# Patient Record
Sex: MALE | Race: WHITE | Employment: FULL TIME | URBAN - METROPOLITAN AREA
[De-identification: names, ages, dates, MRNs, and addresses within clinical notes are randomized per-mention and may not be internally consistent; named-entity substitution may affect disease eponyms.]

---

## 2019-09-26 ENCOUNTER — HOSPITAL ENCOUNTER (OUTPATIENT)
Age: 34
Setting detail: SPECIMEN
Discharge: HOME | End: 2019-09-26
Payer: COMMERCIAL

## 2019-09-26 LAB
ABSOLUTE EOS #: 0.39 K/UL (ref 0–0.44)
ABSOLUTE IMMATURE GRANULOCYTE: 0.03 K/UL (ref 0–0.3)
ABSOLUTE LYMPH #: 2.22 K/UL (ref 1.1–3.7)
ABSOLUTE MONO #: 0.67 K/UL (ref 0.1–1.2)
ALBUMIN SERPL-MCNC: 4 G/DL (ref 3.5–5.2)
ALBUMIN/GLOBULIN RATIO: 1.3 (ref 1–2.5)
ALP BLD-CCNC: 44 U/L (ref 40–129)
ALT SERPL-CCNC: 69 U/L (ref 5–41)
ANION GAP SERPL CALCULATED.3IONS-SCNC: 11 MMOL/L (ref 9–17)
AST SERPL-CCNC: 58 U/L
BASOPHILS # BLD: 1 % (ref 0–2)
BASOPHILS ABSOLUTE: 0.05 K/UL (ref 0–0.2)
BILIRUB SERPL-MCNC: 0.36 MG/DL (ref 0.3–1.2)
BUN BLDV-MCNC: 7 MG/DL (ref 6–20)
BUN/CREAT BLD: ABNORMAL (ref 9–20)
CALCIUM SERPL-MCNC: 9.7 MG/DL (ref 8.6–10.4)
CHLORIDE BLD-SCNC: 105 MMOL/L (ref 98–107)
CHOLESTEROL/HDL RATIO: 3.5
CHOLESTEROL: 169 MG/DL
CO2: 25 MMOL/L (ref 20–31)
CREAT SERPL-MCNC: 0.85 MG/DL (ref 0.7–1.2)
DIFFERENTIAL TYPE: ABNORMAL
EOSINOPHILS RELATIVE PERCENT: 5 % (ref 1–4)
GFR AFRICAN AMERICAN: >60 ML/MIN
GFR NON-AFRICAN AMERICAN: >60 ML/MIN
GFR SERPL CREATININE-BSD FRML MDRD: ABNORMAL ML/MIN/{1.73_M2}
GFR SERPL CREATININE-BSD FRML MDRD: ABNORMAL ML/MIN/{1.73_M2}
GLUCOSE BLD-MCNC: 103 MG/DL (ref 70–99)
HAV IGM SER IA-ACNC: NONREACTIVE
HCT VFR BLD CALC: 46.8 % (ref 40.7–50.3)
HDLC SERPL-MCNC: 48 MG/DL
HEMOGLOBIN: 14.9 G/DL (ref 13–17)
HEPATITIS B CORE IGM ANTIBODY: NONREACTIVE
HEPATITIS B SURFACE ANTIGEN: NONREACTIVE
HEPATITIS C ANTIBODY: REACTIVE
IMMATURE GRANULOCYTES: 0 %
LDL CHOLESTEROL: 104 MG/DL (ref 0–130)
LYMPHOCYTES # BLD: 28 % (ref 24–43)
MCH RBC QN AUTO: 29.7 PG (ref 25.2–33.5)
MCHC RBC AUTO-ENTMCNC: 31.8 G/DL (ref 28.4–34.8)
MCV RBC AUTO: 93.4 FL (ref 82.6–102.9)
MONOCYTES # BLD: 9 % (ref 3–12)
NRBC AUTOMATED: 0 PER 100 WBC
PDW BLD-RTO: 13.2 % (ref 11.8–14.4)
PLATELET # BLD: 331 K/UL (ref 138–453)
PLATELET ESTIMATE: ABNORMAL
PMV BLD AUTO: 10.9 FL (ref 8.1–13.5)
POTASSIUM SERPL-SCNC: 4.5 MMOL/L (ref 3.7–5.3)
RBC # BLD: 5.01 M/UL (ref 4.21–5.77)
RBC # BLD: ABNORMAL 10*6/UL
SEG NEUTROPHILS: 57 % (ref 36–65)
SEGMENTED NEUTROPHILS ABSOLUTE COUNT: 4.48 K/UL (ref 1.5–8.1)
SODIUM BLD-SCNC: 141 MMOL/L (ref 135–144)
T. PALLIDUM, IGG: NONREACTIVE
TOTAL PROTEIN: 7.2 G/DL (ref 6.4–8.3)
TRIGL SERPL-MCNC: 84 MG/DL
TSH SERPL DL<=0.05 MIU/L-ACNC: 0.4 MIU/L (ref 0.3–5)
VLDLC SERPL CALC-MCNC: NORMAL MG/DL (ref 1–30)
WBC # BLD: 7.8 K/UL (ref 3.5–11.3)
WBC # BLD: ABNORMAL 10*3/UL

## 2019-09-27 LAB
DIRECT EXAM: ABNORMAL
DIRECT EXAM: ABNORMAL
HIV AG/AB: NONREACTIVE
Lab: ABNORMAL
SEDIMENTATION RATE, ERYTHROCYTE: 3 MM (ref 0–10)
SPECIMEN DESCRIPTION: ABNORMAL

## 2019-09-30 LAB
HCV QUANTITATIVE: NORMAL
HEPATITIS C GENOTYPE: NORMAL
PORPHYRIN ERYTHROCYTE: 26 UG/DL (ref 0–35)

## 2020-03-25 PROCEDURE — 99282 EMERGENCY DEPT VISIT SF MDM: CPT

## 2020-03-26 ENCOUNTER — APPOINTMENT (OUTPATIENT)
Dept: GENERAL RADIOLOGY | Age: 35
End: 2020-03-26
Payer: COMMERCIAL

## 2020-03-26 ENCOUNTER — HOSPITAL ENCOUNTER (EMERGENCY)
Age: 35
Discharge: HOME | End: 2020-03-26
Attending: EMERGENCY MEDICINE
Payer: COMMERCIAL

## 2020-03-26 VITALS
OXYGEN SATURATION: 98 % | DIASTOLIC BLOOD PRESSURE: 70 MMHG | BODY MASS INDEX: 26.66 KG/M2 | TEMPERATURE: 98.2 F | WEIGHT: 180 LBS | HEIGHT: 69 IN | RESPIRATION RATE: 18 BRPM | SYSTOLIC BLOOD PRESSURE: 131 MMHG | HEART RATE: 83 BPM

## 2020-03-26 LAB
FLU A ANTIGEN: NEGATIVE
FLU B ANTIGEN: NEGATIVE
GROUP A STREP CULTURE, REFLEX: NEGATIVE
REFLEX THROAT C + S: NORMAL

## 2020-03-26 PROCEDURE — 87880 STREP A ASSAY W/OPTIC: CPT

## 2020-03-26 PROCEDURE — 87804 INFLUENZA ASSAY W/OPTIC: CPT

## 2020-03-26 PROCEDURE — 87070 CULTURE OTHR SPECIMN AEROBIC: CPT

## 2020-03-26 PROCEDURE — 71045 X-RAY EXAM CHEST 1 VIEW: CPT

## 2020-03-26 RX ORDER — AZITHROMYCIN 250 MG/1
TABLET, FILM COATED ORAL
Qty: 1 PACKET | Refills: 0 | Status: SHIPPED | OUTPATIENT
Start: 2020-03-26 | End: 2020-03-30

## 2020-03-26 NOTE — ED NOTES
Patient requested a work note. Talked with Dr. Santosh Lucas, told patient he should stay off for 14 days due to not being able to test him for Covid 19.       Kristen Valdez, JARROD  03/26/20 1600

## 2020-03-27 ENCOUNTER — CARE COORDINATION (OUTPATIENT)
Dept: CARE COORDINATION | Age: 35
End: 2020-03-27

## 2020-03-27 NOTE — CARE COORDINATION
COVID-19 Screening Initial Follow-up Note     Patient contacted regarding Nitin Atkins. Care Transition Nurse/ Ambulatory Care Manager contacted the patient by telephone to perform post discharge assessment. Verified name and  with patient as identifiers. Provided introduction to self, and explanation of the CTN/ACM role, and reason for call due to risk factors for infection and/or exposure to COVID-19. Symptoms reviewed with patient who verbalized the following symptoms: cough, SOB, chills. Due to no new or worsening symptoms encounter was not routed to provider for escalation. Patient has following risk factors of: symptoms present: SOB, cough, chills. CTN/ACM reviewed discharge instructions, medical action plan and red flags such as increased shortness of breath, increasing fever and signs of decompensation with patient who verbalized understanding. Discussed exposure protocols and quarantine with CDC Guidelines What to do if you are sick with coronavirus disease 2019 Patient who was given an opportunity for questions and concerns. The patient agrees to contact the [facility] exposure line [phone], local [facility] department [place] [facility]: ([phone]) and PCP office for questions related to their healthcare. CTN/ACM provided contact information for future reference. Reviewed and educated patient on any new and changed medications related to discharge diagnosis     Plan for follow-up call in 3-5 days based on severity of symptoms and risk factors  Spoke with pt today. Pt states that he was told by someone if he goes to flu clinic he can be tested for COVID-19. Informed him that is not accurate information. Informed pt that the testing completed in ED yesterday is testing they are doing at clinic. Pt states that he did obtain atb prescribed yesterday. Symptoms continue but feels they have improved slightly. Educated pt on COVID-19 precautions.   Encouraged to complete atb as prescribed and continue monitoring symptoms.

## 2020-03-28 LAB — THROAT/NOSE CULTURE: NORMAL

## 2020-03-30 ENCOUNTER — CARE COORDINATION (OUTPATIENT)
Dept: CARE COORDINATION | Age: 35
End: 2020-03-30

## 2020-04-08 ENCOUNTER — CARE COORDINATION (OUTPATIENT)
Dept: CARE COORDINATION | Age: 35
End: 2020-04-08

## 2020-04-08 NOTE — CARE COORDINATION
COVID-19 Screening Follow-up Note    Patient contacted regarding COVID-19 risk and screening. Care Transition Nurse/ Ambulatory Care Manager contacted the patient by telephone to perform follow-up assessment. Verified name and  with patient as identifiers. Patient has following risk factors of: no known risk factors        Symptoms reviewed with patient who verbalized the following symptoms: pt reports that all symptoms have resolved. Due to no new or worsening symptoms encounter was not routed to provider for escalation. Education provided regarding infection prevention, and signs and symptoms of COVID-19 and when to seek medical attention with patient who verbalized understanding. Discussed exposure protocols and quarantine from [address] you at higher risk for severe illness  and given an opportunity for questions and concerns. The patient agrees to contact the COVID-19 hotline [phone] or PCP office for questions related to their healthcare. CTN/ACM provided contact information for future reference. From CDC: Are you at higher risk for severe illness?  Wash your hands often.  Avoid close contact (6 feet, which is about two arm lengths) with people who are sick.  Put distance between yourself and other people if COVID-19 is spreading in your community.  Clean and disinfect frequently touched surfaces.  Avoid all cruise travel and non-essential air travel.  Call your healthcare professional if you have concerns about COVID-19 and your underlying condition or if you are sick.     For more information on steps you can take to protect yourself, see CDC's How to [place] for follow-up call in 5-7 days based on severity of symptoms and risk factors

## 2020-04-14 ENCOUNTER — CARE COORDINATION (OUTPATIENT)
Dept: CARE COORDINATION | Age: 35
End: 2020-04-14

## 2020-05-07 ENCOUNTER — HOSPITAL ENCOUNTER (EMERGENCY)
Age: 35
Discharge: HOME | End: 2020-05-07
Payer: COMMERCIAL

## 2020-05-07 VITALS
RESPIRATION RATE: 15 BRPM | HEART RATE: 58 BPM | DIASTOLIC BLOOD PRESSURE: 64 MMHG | WEIGHT: 170 LBS | TEMPERATURE: 98.5 F | SYSTOLIC BLOOD PRESSURE: 106 MMHG | OXYGEN SATURATION: 100 % | BODY MASS INDEX: 26.68 KG/M2 | HEIGHT: 67 IN

## 2020-05-07 LAB
ACETAMINOPHEN LEVEL: < 5 UG/ML (ref 0–20)
ALBUMIN SERPL-MCNC: 4.8 G/DL (ref 3.5–5.1)
ALP BLD-CCNC: 48 U/L (ref 38–126)
ALT SERPL-CCNC: 38 U/L (ref 11–66)
AMPHETAMINE+METHAMPHETAMINE URINE SCREEN: POSITIVE
ANION GAP SERPL CALCULATED.3IONS-SCNC: 18 MEQ/L (ref 8–16)
AST SERPL-CCNC: 49 U/L (ref 5–40)
BACTERIA: ABNORMAL /HPF
BARBITURATE QUANTITATIVE URINE: NEGATIVE
BASOPHILS # BLD: 0.5 %
BASOPHILS ABSOLUTE: 0.1 THOU/MM3 (ref 0–0.1)
BENZODIAZEPINE QUANTITATIVE URINE: NEGATIVE
BILIRUB SERPL-MCNC: 0.8 MG/DL (ref 0.3–1.2)
BILIRUBIN DIRECT: < 0.2 MG/DL (ref 0–0.3)
BILIRUBIN URINE: NEGATIVE
BLOOD, URINE: NEGATIVE
BUN BLDV-MCNC: 17 MG/DL (ref 7–22)
CALCIUM SERPL-MCNC: 10.2 MG/DL (ref 8.5–10.5)
CANNABINOID QUANTITATIVE URINE: POSITIVE
CASTS 2: ABNORMAL /LPF
CASTS UA: ABNORMAL /LPF
CHARACTER, URINE: CLEAR
CHLORIDE BLD-SCNC: 96 MEQ/L (ref 98–111)
CO2: 19 MEQ/L (ref 23–33)
COCAINE METABOLITE QUANTITATIVE URINE: NEGATIVE
COLOR: YELLOW
CREAT SERPL-MCNC: 1.2 MG/DL (ref 0.4–1.2)
CRYSTALS, UA: ABNORMAL
EOSINOPHIL # BLD: 0.4 %
EOSINOPHILS ABSOLUTE: 0 THOU/MM3 (ref 0–0.4)
EPITHELIAL CELLS, UA: ABNORMAL /HPF
ERYTHROCYTE [DISTWIDTH] IN BLOOD BY AUTOMATED COUNT: 13.2 % (ref 11.5–14.5)
ERYTHROCYTE [DISTWIDTH] IN BLOOD BY AUTOMATED COUNT: 43.3 FL (ref 35–45)
ETHYL ALCOHOL, SERUM: < 0.01 %
GFR SERPL CREATININE-BSD FRML MDRD: 69 ML/MIN/1.73M2
GLUCOSE BLD-MCNC: 81 MG/DL (ref 70–108)
GLUCOSE URINE: NEGATIVE MG/DL
HCT VFR BLD CALC: 46.1 % (ref 42–52)
HEMOGLOBIN: 15.5 GM/DL (ref 14–18)
IMMATURE GRANS (ABS): 0.04 THOU/MM3 (ref 0–0.07)
IMMATURE GRANULOCYTES: 0.4 %
KETONES, URINE: 80
LEUKOCYTE ESTERASE, URINE: NEGATIVE
LYMPHOCYTES # BLD: 21.6 %
LYMPHOCYTES ABSOLUTE: 2.4 THOU/MM3 (ref 1–4.8)
MCH RBC QN AUTO: 30.3 PG (ref 26–33)
MCHC RBC AUTO-ENTMCNC: 33.6 GM/DL (ref 32.2–35.5)
MCV RBC AUTO: 90 FL (ref 80–94)
MISCELLANEOUS 2: ABNORMAL
MONOCYTES # BLD: 6.1 %
MONOCYTES ABSOLUTE: 0.7 THOU/MM3 (ref 0.4–1.3)
NITRITE, URINE: NEGATIVE
NUCLEATED RED BLOOD CELLS: 0 /100 WBC
OPIATES, URINE: NEGATIVE
OSMOLALITY CALCULATION: 267 MOSMOL/KG (ref 275–300)
OXYCODONE: NEGATIVE
PH UA: 5 (ref 5–9)
PHENCYCLIDINE QUANTITATIVE URINE: NEGATIVE
PLATELET # BLD: 395 THOU/MM3 (ref 130–400)
PMV BLD AUTO: 9.4 FL (ref 9.4–12.4)
POTASSIUM SERPL-SCNC: 4.3 MEQ/L (ref 3.5–5.2)
PROTEIN UA: ABNORMAL
RBC # BLD: 5.12 MILL/MM3 (ref 4.7–6.1)
RBC URINE: ABNORMAL /HPF
RENAL EPITHELIAL, UA: ABNORMAL
SALICYLATE, SERUM: < 0.3 MG/DL (ref 2–10)
SEG NEUTROPHILS: 71 %
SEGMENTED NEUTROPHILS ABSOLUTE COUNT: 7.9 THOU/MM3 (ref 1.8–7.7)
SODIUM BLD-SCNC: 133 MEQ/L (ref 135–145)
SPECIFIC GRAVITY, URINE: 1.03 (ref 1–1.03)
TOTAL PROTEIN: 7.7 G/DL (ref 6.1–8)
TSH SERPL DL<=0.05 MIU/L-ACNC: 1.28 UIU/ML (ref 0.4–4.2)
UROBILINOGEN, URINE: 1 EU/DL (ref 0–1)
WBC # BLD: 11.1 THOU/MM3 (ref 4.8–10.8)
WBC UA: ABNORMAL /HPF
YEAST: ABNORMAL

## 2020-05-07 PROCEDURE — 80053 COMPREHEN METABOLIC PANEL: CPT

## 2020-05-07 PROCEDURE — 82248 BILIRUBIN DIRECT: CPT

## 2020-05-07 PROCEDURE — 36415 COLL VENOUS BLD VENIPUNCTURE: CPT

## 2020-05-07 PROCEDURE — 85025 COMPLETE CBC W/AUTO DIFF WBC: CPT

## 2020-05-07 PROCEDURE — G0480 DRUG TEST DEF 1-7 CLASSES: HCPCS

## 2020-05-07 PROCEDURE — 84443 ASSAY THYROID STIM HORMONE: CPT

## 2020-05-07 PROCEDURE — 80307 DRUG TEST PRSMV CHEM ANLYZR: CPT

## 2020-05-07 PROCEDURE — 96372 THER/PROPH/DIAG INJ SC/IM: CPT

## 2020-05-07 PROCEDURE — 81001 URINALYSIS AUTO W/SCOPE: CPT

## 2020-05-07 PROCEDURE — 6360000002 HC RX W HCPCS: Performed by: NURSE PRACTITIONER

## 2020-05-07 PROCEDURE — 99285 EMERGENCY DEPT VISIT HI MDM: CPT

## 2020-05-07 RX ORDER — LORAZEPAM 2 MG/ML
1 INJECTION INTRAMUSCULAR ONCE
Status: COMPLETED | OUTPATIENT
Start: 2020-05-07 | End: 2020-05-07

## 2020-05-07 RX ORDER — OLANZAPINE 10 MG/1
5 TABLET, ORALLY DISINTEGRATING ORAL ONCE
Status: DISCONTINUED | OUTPATIENT
Start: 2020-05-07 | End: 2020-05-07

## 2020-05-07 RX ORDER — HALOPERIDOL 5 MG/ML
5 INJECTION INTRAMUSCULAR ONCE
Status: COMPLETED | OUTPATIENT
Start: 2020-05-07 | End: 2020-05-07

## 2020-05-07 RX ORDER — DIPHENHYDRAMINE HYDROCHLORIDE 50 MG/ML
50 INJECTION INTRAMUSCULAR; INTRAVENOUS ONCE
Status: COMPLETED | OUTPATIENT
Start: 2020-05-07 | End: 2020-05-07

## 2020-05-07 RX ADMIN — HALOPERIDOL LACTATE 5 MG: 5 INJECTION, SOLUTION INTRAMUSCULAR at 01:00

## 2020-05-07 RX ADMIN — DIPHENHYDRAMINE HYDROCHLORIDE 50 MG: 50 INJECTION, SOLUTION INTRAMUSCULAR; INTRAVENOUS at 01:00

## 2020-05-07 RX ADMIN — LORAZEPAM 1 MG: 2 INJECTION, SOLUTION INTRAMUSCULAR; INTRAVENOUS at 01:00

## 2020-05-07 ASSESSMENT — SLEEP AND FATIGUE QUESTIONNAIRES
DO YOU HAVE DIFFICULTY SLEEPING: YES
AVERAGE NUMBER OF SLEEP HOURS: 0
DIFFICULTY FALLING ASLEEP: YES
DO YOU USE A SLEEP AID: NO
DIFFICULTY ARISING: NO
RESTFUL SLEEP: NO
DIFFICULTY STAYING ASLEEP: YES
SLEEP PATTERN: INSOMNIA

## 2020-05-07 ASSESSMENT — ENCOUNTER SYMPTOMS
NAUSEA: 0
SORE THROAT: 0
RHINORRHEA: 0
VOMITING: 0
COUGH: 0
SHORTNESS OF BREATH: 0
ABDOMINAL PAIN: 0

## 2020-05-07 NOTE — ED NOTES
Pt resting supine at this time. Pt opened eyes and moved around in bed. VS reassessed and Respiraitons regular. This RN told pt that VS need to be taken and pt states \" mhm\". Unable to get urine sample at this time.       Suresh Rush RN  05/07/20 7772
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
## 2020-05-07 NOTE — ED NOTES
ED nurse-to-nurse bedside report    Chief Complaint   Patient presents with    Paranoid    Psychiatric Evaluation      LOC: alert to only name  Vital signs   Vitals:    05/07/20 0042 05/07/20 0102 05/07/20 0333 05/07/20 0513   BP: (!) 147/98  (!) 97/48 106/64   Pulse: 120  53 58   Resp: 20  15 15   Temp: 98.5 °F (36.9 °C)      TempSrc: Oral      SpO2: 94%  99% 100%   Weight:  170 lb (77.1 kg)     Height:  5' 7\" (1.702 m)        Pain:    Pain Interventions: haldol, benadryl, ativan   Pain Goal: none  Oxygen: No    Current needs required room air    Telemetry: No  LDAs:    Continuous Infusions:   Mobility: Independent  Ralph Fall Risk Score: No flowsheet data found.   Fall Interventions: side rail up, bed locked and in lowest position   Report given to: Raphael Vela RN  05/07/20 0715       Tristan Vela RN  05/07/20 9306

## 2020-05-07 NOTE — ED NOTES
Patient placed in safe room that is ligature resistant with continuous monitoring in place. Provider notified, requested an assessment by behavioral health . Patient belongings secured in a locked lockers outside of the room. Explained suicide prevention precautions to the patient including constant observer.         [place], [place]  54/69/28 1392

## 2020-05-07 NOTE — PROGRESS NOTES
36  Clinician attempted to wake patient. Clinician turned the lights on and stated patient's name numerous times. Patient somewhat awake, but still lethargic. Patient denies suicidal and homicidal thought. Regarding if his family is still in danger, patient reports \"They probably are\". [address] served Dr. Calin Andrade. [address] with Dr. Calin Andrade about patient. He states 'let the patient sleep' and 'get a urine drug screen'. He states to keep attempting to get a urine drug screen with patient. Charge nurse Brooks Choudhury informed. 910  Spoke with patient. Pt states he cannot provide a urine sample right now. Patient reports he went to [facility] for a drug problem. Patient does not recall what happened at [facility]. Regarding if his grandparents are in danger, pt reports \"I do not know, it might be a possibility\". Regarding if someone is out to harm him, pt reports 'could be a possibility'. Clinician left light on in patients room. Patient encouraged to provide a urine. 936  Patient encouraged to provide urine specimen. Pt unable to provide sample. 1037  Patient still unable to provide urine specimen. Patient seems more awake and alert now. Regarding admission or discharge, patient reports 'I do not know, I would like to see about my family. They are probably not alive anymore'. Patient provided with phone to contact family members. 1046  Patient provided urine specimen. Brooks Choudhury RN informed. [facility] served Dr. Calin Andrade  [phone]  Consulted with Dr. Calin Andrade. Patient to be discharged and follow up with [facility]. ED provider informed. Patient informed of POC who was receptive. [facility] provided to Ravi Contreras NP.  1200  Spoke with provider. Patient will be discharged.

## 2020-10-14 ENCOUNTER — HOSPITAL ENCOUNTER (OUTPATIENT)
Dept: ULTRASOUND IMAGING | Age: 35
Discharge: HOME | End: 2020-10-14
Payer: COMMERCIAL

## 2020-10-14 ENCOUNTER — HOSPITAL ENCOUNTER (OUTPATIENT)
Age: 35
Discharge: HOME | End: 2020-10-14
Payer: COMMERCIAL

## 2020-10-14 LAB
ALBUMIN SERPL-MCNC: 4.3 G/DL (ref 3.5–5.1)
ALP BLD-CCNC: 47 U/L (ref 38–126)
ALT SERPL-CCNC: 47 U/L (ref 11–66)
ANION GAP SERPL CALCULATED.3IONS-SCNC: 10 MEQ/L (ref 8–16)
AST SERPL-CCNC: 36 U/L (ref 5–40)
BASOPHILS # BLD: 0.8 %
BASOPHILS ABSOLUTE: 0.1 THOU/MM3 (ref 0–0.1)
BILIRUB SERPL-MCNC: 0.4 MG/DL (ref 0.3–1.2)
BUN BLDV-MCNC: 10 MG/DL (ref 7–22)
CALCIUM SERPL-MCNC: 9.5 MG/DL (ref 8.5–10.5)
CHLORIDE BLD-SCNC: 104 MEQ/L (ref 98–111)
CO2: 27 MEQ/L (ref 23–33)
CREAT SERPL-MCNC: 0.9 MG/DL (ref 0.4–1.2)
EOSINOPHIL # BLD: 3.4 %
EOSINOPHILS ABSOLUTE: 0.2 THOU/MM3 (ref 0–0.4)
ERYTHROCYTE [DISTWIDTH] IN BLOOD BY AUTOMATED COUNT: 12.8 % (ref 11.5–14.5)
ERYTHROCYTE [DISTWIDTH] IN BLOOD BY AUTOMATED COUNT: 44 FL (ref 35–45)
GFR SERPL CREATININE-BSD FRML MDRD: > 90 ML/MIN/1.73M2
GLUCOSE BLD-MCNC: 91 MG/DL (ref 70–108)
HBV SURFACE AB TITR SER: POSITIVE {TITER}
HCT VFR BLD CALC: 49 % (ref 42–52)
HEMOGLOBIN: 16.4 GM/DL (ref 14–18)
HEPATITIS B CORE IGM ANTIBODY: NEGATIVE
HEPATITIS B SURFACE ANTIGEN: NEGATIVE
HEPATITIS C ANTIBODY: ABNORMAL
IMMATURE GRANS (ABS): 0.03 THOU/MM3 (ref 0–0.07)
IMMATURE GRANULOCYTES: 0.4 %
INR BLD: 0.89 (ref 0.85–1.13)
LYMPHOCYTES # BLD: 35.9 %
LYMPHOCYTES ABSOLUTE: 2.5 THOU/MM3 (ref 1–4.8)
MCH RBC QN AUTO: 31.5 PG (ref 26–33)
MCHC RBC AUTO-ENTMCNC: 33.5 GM/DL (ref 32.2–35.5)
MCV RBC AUTO: 94.2 FL (ref 80–94)
MONOCYTES # BLD: 6.3 %
MONOCYTES ABSOLUTE: 0.4 THOU/MM3 (ref 0.4–1.3)
NUCLEATED RED BLOOD CELLS: 0 /100 WBC
PLATELET # BLD: 330 THOU/MM3 (ref 130–400)
PMV BLD AUTO: 10 FL (ref 9.4–12.4)
POTASSIUM SERPL-SCNC: 4.6 MEQ/L (ref 3.5–5.2)
RBC # BLD: 5.2 MILL/MM3 (ref 4.7–6.1)
SEG NEUTROPHILS: 53.2 %
SEGMENTED NEUTROPHILS ABSOLUTE COUNT: 3.8 THOU/MM3 (ref 1.8–7.7)
SODIUM BLD-SCNC: 141 MEQ/L (ref 135–145)
TOTAL PROTEIN: 7.2 G/DL (ref 6.1–8)
WBC # BLD: 7.1 THOU/MM3 (ref 4.8–10.8)

## 2020-10-14 PROCEDURE — 85025 COMPLETE CBC W/AUTO DIFF WBC: CPT

## 2020-10-14 PROCEDURE — 76981 USE PARENCHYMA: CPT

## 2020-10-14 PROCEDURE — 82977 ASSAY OF GGT: CPT

## 2020-10-14 PROCEDURE — 80053 COMPREHEN METABOLIC PANEL: CPT

## 2020-10-14 PROCEDURE — 86803 HEPATITIS C AB TEST: CPT

## 2020-10-14 PROCEDURE — 87517 HEPATITIS B DNA QUANT: CPT

## 2020-10-14 PROCEDURE — 87340 HEPATITIS B SURFACE AG IA: CPT

## 2020-10-14 PROCEDURE — 83883 ASSAY NEPHELOMETRY NOT SPEC: CPT

## 2020-10-14 PROCEDURE — 84520 ASSAY OF UREA NITROGEN: CPT

## 2020-10-14 PROCEDURE — 87902 NFCT AGT GNTYP ALYS HEP C: CPT

## 2020-10-14 PROCEDURE — 85610 PROTHROMBIN TIME: CPT

## 2020-10-14 PROCEDURE — 87522 HEPATITIS C REVRS TRNSCRPJ: CPT

## 2020-10-14 PROCEDURE — 86706 HEP B SURFACE ANTIBODY: CPT

## 2020-10-14 PROCEDURE — 87389 HIV-1 AG W/HIV-1&-2 AB AG IA: CPT

## 2020-10-14 PROCEDURE — 36415 COLL VENOUS BLD VENIPUNCTURE: CPT

## 2020-10-14 PROCEDURE — 84460 ALANINE AMINO (ALT) (SGPT): CPT

## 2020-10-14 PROCEDURE — 84450 TRANSFERASE (AST) (SGOT): CPT

## 2020-10-14 PROCEDURE — 86705 HEP B CORE ANTIBODY IGM: CPT

## 2020-10-17 LAB — HIV-2 AB: NEGATIVE

## 2020-10-19 LAB
ALANINE AMINOTRANSFERASE, FIBROMETER: 55 U/L (ref 5–50)
ALPHA-2-MACROGLOBULIN, FIBROMETER: 149 MG/DL (ref 131–293)
ASPARTATE AMINOTRANSFERASE, FIBROMETER: 45 U/L (ref 9–50)
CIRRHOMETER PATIENT SCORE: 0
EER FIBROMETER REPORT: ABNORMAL
FIBROMETER INTERPRETATION: ABNORMAL
FIBROMETER PATIENT SCORE: 0.13
FIBROMETER PLATELET COUNT: 330 K/UL
FIBROMETER PROTHROMBIN INDEX: 101 % (ref 90–120)
FIBROSIS METAVIR CLASSIFICATION: ABNORMAL
GAMMA GLUTAMYL TRANSFERASE, FIBROMETER: 28 U/L (ref 7–51)
HCV GENOTYPE: NORMAL
INFLAMETER METAVIR CLASSIFICATION: ABNORMAL
INFLAMETER PATIENT SCORE: 0.18
UREA NITROGEN, FIBROMETER: 11 MG/DL (ref 7–20)

## 2020-10-20 ENCOUNTER — OFFICE VISIT (OUTPATIENT)
Dept: SURGERY | Age: 35
End: 2020-10-20
Payer: COMMERCIAL

## 2020-10-20 ENCOUNTER — HOSPITAL ENCOUNTER (OUTPATIENT)
Age: 35
Setting detail: SPECIMEN
Discharge: HOME | End: 2020-10-20
Payer: COMMERCIAL

## 2020-10-20 VITALS
BODY MASS INDEX: 28.61 KG/M2 | WEIGHT: 193.2 LBS | SYSTOLIC BLOOD PRESSURE: 122 MMHG | HEIGHT: 69 IN | OXYGEN SATURATION: 98 % | RESPIRATION RATE: 14 BRPM | DIASTOLIC BLOOD PRESSURE: 74 MMHG | TEMPERATURE: 97.5 F | HEART RATE: 69 BPM

## 2020-10-20 LAB
HBV DNA IU/ML: NOT DETECTED IU/ML
INTERPRETATION: NOT DETECTED
LOG 10 HBV AS IU/ML: NOT DETECTED LOG IU/ML

## 2020-10-20 PROCEDURE — 99203 OFFICE O/P NEW LOW 30 MIN: CPT | Performed by: SURGERY

## 2020-10-20 PROCEDURE — U0003 INFECTIOUS AGENT DETECTION BY NUCLEIC ACID (DNA OR RNA); SEVERE ACUTE RESPIRATORY SYNDROME CORONAVIRUS 2 (SARS-COV-2) (CORONAVIRUS DISEASE [COVID-19]), AMPLIFIED PROBE TECHNIQUE, MAKING USE OF HIGH THROUGHPUT TECHNOLOGIES AS DESCRIBED BY CMS-2020-01-R: HCPCS

## 2020-10-20 RX ORDER — OXCARBAZEPINE 300 MG/1
300 TABLET, FILM COATED ORAL DAILY
COMMUNITY
End: 2021-12-17 | Stop reason: ALTCHOICE

## 2020-10-20 RX ORDER — BUPRENORPHINE AND NALOXONE 8; 2 MG/1; MG/1
1 FILM, SOLUBLE BUCCAL; SUBLINGUAL 2 TIMES DAILY
COMMUNITY
End: 2021-12-17 | Stop reason: ALTCHOICE

## 2020-10-21 LAB
HCV QNT BY NAAT INTERPRETATION: DETECTED
HCV QNT BY NAAT IU/ML: ABNORMAL
HCV QNT BY NAAT LOG IU/ML: 6.14 LOG IU/ML

## 2020-10-22 LAB — SARS-COV-2: NOT DETECTED

## 2020-10-26 ENCOUNTER — ANESTHESIA EVENT (OUTPATIENT)
Dept: OPERATING ROOM | Age: 35
End: 2020-10-26
Payer: COMMERCIAL

## 2020-10-26 ENCOUNTER — TELEPHONE (OUTPATIENT)
Dept: SURGERY | Age: 35
End: 2020-10-26

## 2020-10-26 ENCOUNTER — ANESTHESIA (OUTPATIENT)
Dept: OPERATING ROOM | Age: 35
End: 2020-10-26
Payer: COMMERCIAL

## 2020-10-26 ENCOUNTER — HOSPITAL ENCOUNTER (OUTPATIENT)
Age: 35
Setting detail: OUTPATIENT SURGERY
Discharge: HOME | End: 2020-10-26
Attending: SURGERY | Admitting: SURGERY
Payer: COMMERCIAL

## 2020-10-26 VITALS
RESPIRATION RATE: 16 BRPM | HEART RATE: 61 BPM | BODY MASS INDEX: 28.91 KG/M2 | OXYGEN SATURATION: 98 % | HEIGHT: 69 IN | SYSTOLIC BLOOD PRESSURE: 113 MMHG | DIASTOLIC BLOOD PRESSURE: 69 MMHG | TEMPERATURE: 98.4 F | WEIGHT: 195.2 LBS

## 2020-10-26 VITALS
OXYGEN SATURATION: 100 % | DIASTOLIC BLOOD PRESSURE: 52 MMHG | RESPIRATION RATE: 13 BRPM | SYSTOLIC BLOOD PRESSURE: 93 MMHG

## 2020-10-26 PROCEDURE — 7100000011 HC PHASE II RECOVERY - ADDTL 15 MIN: Performed by: SURGERY

## 2020-10-26 PROCEDURE — 6360000002 HC RX W HCPCS: Performed by: SURGERY

## 2020-10-26 PROCEDURE — 6360000002 HC RX W HCPCS: Performed by: NURSE ANESTHETIST, CERTIFIED REGISTERED

## 2020-10-26 PROCEDURE — 25071 EXC FOREARM LES SC 3 CM/>: CPT | Performed by: SURGERY

## 2020-10-26 PROCEDURE — 2500000003 HC RX 250 WO HCPCS: Performed by: NURSE ANESTHETIST, CERTIFIED REGISTERED

## 2020-10-26 PROCEDURE — 88304 TISSUE EXAM BY PATHOLOGIST: CPT

## 2020-10-26 PROCEDURE — 3700000000 HC ANESTHESIA ATTENDED CARE: Performed by: SURGERY

## 2020-10-26 PROCEDURE — 7100000010 HC PHASE II RECOVERY - FIRST 15 MIN: Performed by: SURGERY

## 2020-10-26 PROCEDURE — 3600000002 HC SURGERY LEVEL 2 BASE: Performed by: SURGERY

## 2020-10-26 PROCEDURE — 3700000001 HC ADD 15 MINUTES (ANESTHESIA): Performed by: SURGERY

## 2020-10-26 PROCEDURE — 3600000012 HC SURGERY LEVEL 2 ADDTL 15MIN: Performed by: SURGERY

## 2020-10-26 PROCEDURE — 2500000003 HC RX 250 WO HCPCS: Performed by: SURGERY

## 2020-10-26 PROCEDURE — 2580000003 HC RX 258: Performed by: SURGERY

## 2020-10-26 PROCEDURE — 2709999900 HC NON-CHARGEABLE SUPPLY: Performed by: SURGERY

## 2020-10-26 RX ORDER — SODIUM CHLORIDE 9 MG/ML
INJECTION, SOLUTION INTRAVENOUS CONTINUOUS
Status: DISCONTINUED | OUTPATIENT
Start: 2020-10-26 | End: 2020-10-26 | Stop reason: HOSPADM

## 2020-10-26 RX ORDER — LIDOCAINE HCL/PF 100 MG/5ML
SYRINGE (ML) INJECTION PRN
Status: DISCONTINUED | OUTPATIENT
Start: 2020-10-26 | End: 2020-10-26 | Stop reason: SDUPTHER

## 2020-10-26 RX ORDER — HYDROCODONE BITARTRATE AND ACETAMINOPHEN 5; 325 MG/1; MG/1
1 TABLET ORAL EVERY 6 HOURS PRN
Qty: 10 TABLET | Refills: 0 | Status: SHIPPED | OUTPATIENT
Start: 2020-10-26 | End: 2020-10-29

## 2020-10-26 RX ORDER — MIDAZOLAM HYDROCHLORIDE 1 MG/ML
INJECTION INTRAMUSCULAR; INTRAVENOUS PRN
Status: DISCONTINUED | OUTPATIENT
Start: 2020-10-26 | End: 2020-10-26 | Stop reason: SDUPTHER

## 2020-10-26 RX ORDER — PROPOFOL 10 MG/ML
INJECTION, EMULSION INTRAVENOUS CONTINUOUS PRN
Status: DISCONTINUED | OUTPATIENT
Start: 2020-10-26 | End: 2020-10-26 | Stop reason: SDUPTHER

## 2020-10-26 RX ORDER — PROPOFOL 10 MG/ML
INJECTION, EMULSION INTRAVENOUS PRN
Status: DISCONTINUED | OUTPATIENT
Start: 2020-10-26 | End: 2020-10-26 | Stop reason: SDUPTHER

## 2020-10-26 RX ORDER — FENTANYL CITRATE 50 UG/ML
INJECTION, SOLUTION INTRAMUSCULAR; INTRAVENOUS PRN
Status: DISCONTINUED | OUTPATIENT
Start: 2020-10-26 | End: 2020-10-26 | Stop reason: SDUPTHER

## 2020-10-26 RX ADMIN — FENTANYL CITRATE 50 MCG: 50 INJECTION, SOLUTION INTRAMUSCULAR; INTRAVENOUS at 08:39

## 2020-10-26 RX ADMIN — SODIUM CHLORIDE: 9 INJECTION, SOLUTION INTRAVENOUS at 08:14

## 2020-10-26 RX ADMIN — Medication 60 MG: at 08:40

## 2020-10-26 RX ADMIN — PROPOFOL 75 MCG/KG/MIN: 10 INJECTION, EMULSION INTRAVENOUS at 08:44

## 2020-10-26 RX ADMIN — PROPOFOL 100 MG: 10 INJECTION, EMULSION INTRAVENOUS at 08:43

## 2020-10-26 RX ADMIN — MIDAZOLAM HYDROCHLORIDE 2 MG: 1 INJECTION, SOLUTION INTRAMUSCULAR; INTRAVENOUS at 08:34

## 2020-10-26 RX ADMIN — FENTANYL CITRATE 50 MCG: 50 INJECTION, SOLUTION INTRAMUSCULAR; INTRAVENOUS at 08:51

## 2020-10-26 RX ADMIN — PROPOFOL 50 MG: 10 INJECTION, EMULSION INTRAVENOUS at 08:40

## 2020-10-26 RX ADMIN — CEFAZOLIN 1 G: 1 INJECTION, POWDER, FOR SOLUTION INTRAMUSCULAR; INTRAVENOUS; PARENTERAL at 08:38

## 2020-10-26 ASSESSMENT — PULMONARY FUNCTION TESTS
PIF_VALUE: 1

## 2020-10-26 ASSESSMENT — ENCOUNTER SYMPTOMS
GASTROINTESTINAL NEGATIVE: 1
GASTROINTESTINAL NEGATIVE: 1

## 2020-10-26 ASSESSMENT — PAIN SCALES - GENERAL: PAINLEVEL_OUTOF10: 2

## 2020-10-26 ASSESSMENT — PAIN - FUNCTIONAL ASSESSMENT: PAIN_FUNCTIONAL_ASSESSMENT: 0-10

## 2020-10-26 NOTE — TELEPHONE ENCOUNTER
Xena Anne from [address] called regarding [employer] by Dr. Lashaun Harry after surgery- pt takes Suboxon 8-2 mg films and pharmacist is concerned about pt taking both. I called pt PCP Dr. Raffaele Jimenez to see what he recommends as far as pain management post op- spoke to Cait Landa CNP and she states that it is okay to give pt Norco but cut dispense quantity in half to 5 tablets. Kavita aware and will fill RX for Norco 5-325 dispense quantity 5 tablets. Kavita informing pt.

## 2020-10-26 NOTE — PROGRESS NOTES
Pt and family oriented to SDS 18 and unit. Pt verbalized approval for first name, last initial and physician name on unit whiteboard. Fall band applied. Vaccine information given. Plan of care reviewed.

## 2020-10-26 NOTE — PROGRESS NOTES
Pt has met discharge criteria and states he is ready for discharge to home. IV removed, gauze and tape applied. Dressed in own clothes and personal belongings gathered. Discharge instructions (with opioid medication education information) given to pt and family; pt and family verbalized understanding of discharge instructions, prescriptions and follow up appointments. Pt transported to discharge lobby by [place] staff.
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
## 2020-10-26 NOTE — H&P
Update History & Physical    The patient's History and Physical of October 20, 2020 was reviewed with the patient and I examined the patient. There was no change. The surgical site was confirmed by the patient and me. Plan: The risks, benefits, expected outcome, and alternative to the recommended procedure have been discussed with the patient. Patient understands and wants to proceed with the procedure. Electronically signed by Ilya Arita MD on 10/26/2020 at 8:33 AM      Chantell Nunes MD  [facility] H&P    Pt Name: Katharine Mayer  MRN: [id number]  YOB: 1985  Date of evaluation: 10/26/2020  Primary Care Physician: Doug Crow MD  Patient evaluated at the request of  Dr. Reymundo Hamilton  Reason for evaluation: lipomas  IMPRESSIONS:       ICD-10-CM    1. Pre-op testing  Z01.818 COVID-19 Ambulatory   2. Lipoma of left upper extremity  D17.22 EXCISION BENIGN SKIN LESION TRUNK / ARM / LEG   1.   does not have a problem list on file. PLANS:   1. Risk benefits and alternatives to surgery were explained with patient he would like to proceed with excision of left forearm lipoma. I did review with him these are most likely benign and there is always a very small chance that any mass removed to be malignant will be sent to pathology. SUBJECTIVE:   CC: Arm bumps    History of Chief Complaint:    Carly Uribe is a 27 y. o.male who sees me today with complaints of enlarging left arm masses. He initially noticed them 2 years ago but they have been steadily increasing in size. They cause discomfort when he rests his arm on tables. He is never had any bleeding or drainage from the lesions. No alleviating or aggravating factors.     Past Medical History  Past Medical History:   Diagnosis Date    Hep C w/ coma, chronic (HCC)        Past Surgical History  Past Surgical History:   Procedure Laterality Date    ORBITAL RIM RECONSTRUCTION  2009    SHOULDER CLOSED REDUCTION Medications  No current facility-administered medications on file prior to encounter. Current Outpatient Medications on File Prior to Encounter   Medication Sig Dispense Refill    albuterol sulfate (PROAIR RESPICLICK) 016 (90 Base) MCG/ACT aerosol powder inhalation Inhale 2 puffs into the lungs every 4 hours as needed for Wheezing or Shortness of Breath      OXcarbazepine (TRILEPTAL) 300 MG tablet Take 300 mg by mouth daily      buprenorphine-naloxone (SUBOXONE) 8-2 MG FILM SL film Place 1 Film under the tongue 2 times daily.          Allergies  Asa [aspirin]    Family History      Problem Relation Age of Onset    No Known Problems Mother     Alcohol Abuse Father     Liver Disease Father         Social History  Social History     Socioeconomic History    Marital status: Single     Spouse name: Not on file    Number of children: Not on file    Years of education: Not on file    Highest education level: Not on file   Occupational History    Not on file   Social Needs    Financial resource strain: Not on file    Food insecurity     Worry: Not on file     Inability: Not on file    Transportation needs     Medical: Not on file     Non-medical: Not on file   Tobacco Use    Smoking status: Former Smoker     Packs/day: 1.50    Smokeless tobacco: Former User   Substance and Sexual Activity    Alcohol use: No    Drug use: No    Sexual activity: Yes     Partners: Female   Lifestyle    Physical activity     Days per week: Not on file     Minutes per session: Not on file    Stress: Not on file   Relationships    Social connections     Talks on phone: Not on file     Gets together: Not on file     Attends Hoahaoism service: Not on file     Active member of club or organization: Not on file     Attends meetings of clubs or organizations: Not on file     Relationship status: Not on file    Intimate partner violence     Fear of current or ex partner: Not on file     Emotionally abused: Not on file Physically abused: Not on file     Forced sexual activity: Not on file   Other Topics Concern    Not on file   Social History Narrative    Not on file        Review of Systems:  Review of Systems   Constitutional: Negative for activity change and fatigue. HENT: Negative. Gastrointestinal: Negative. Skin:        Enlarging left arm mass   Neurological: Negative. Psychiatric/Behavioral: Negative. OBJECTIVE:   CURRENT VITALS:  height is 5' 9\" (1.753 m) and weight is 195 lb 3.2 oz (88.5 kg). His temporal temperature is 97.3 °F (36.3 °C). His blood pressure is 115/69 and his pulse is 69. His respiration is 16 and oxygen saturation is 96%. Body mass index is 28.83 kg/m². Physical Exam  Vitals signs and nursing note reviewed. Constitutional:       Appearance: Normal appearance. Eyes:      Extraocular Movements: Extraocular movements intact. Pupils: Pupils are equal, round, and reactive to light. Cardiovascular:      Rate and Rhythm: Normal rate. Pulses: Normal pulses. Pulmonary:      Effort: Pulmonary effort is normal. No respiratory distress. Musculoskeletal: Normal range of motion. Comments: Left forearm there is multilobulated mobile rubbery mass measuring 3 x 2.1 cm    Skin:     General: Skin is warm and dry. Neurological:      General: No focal deficit present. Mental Status: He is alert and oriented to person, place, and time. Psychiatric:         Mood and Affect: Mood normal.         Behavior: Behavior normal.         Thought Content: Thought content normal.         LABS:   No results for input(s): WBC, HGB, HCT, PLT, NA, K, CL, CO2, BUN, CREATININE, MG, PHOS, CALCIUM, PTT, INR, AST, ALT, BILITOT, BILIDIR, AMYLASE, LIPASE, LDH, LACTA, NITRU, COLORU, BACTERIA in the last 72 hours. Invalid input(s): PT, WBCU, RBCU, LEUKOCYTESUA  RADIOLOGY:   I have personally reviewed the following films:  No orders to display       Thank you for the interesting evaluation. Further recommendations to follow.     Electronically signed by Anupam Howell MD on 10/26/2020 at 8:33 AM

## 2020-10-26 NOTE — FLOWSHEET NOTE
Pt returned to [facility] room 18. Vitals and assessment as charted. 0.9 infusing, @250ml to count from PACU. Pt has muffin and water. Family at the bedside. Pt and family verbalized understanding of discharge criteria and call light use. Call light in reach.

## 2020-10-26 NOTE — PROGRESS NOTES
Santos Gomez MD  General Surgery Clinic H&P    Pt Name: Jose Carlos Villeda  MRN: [id number]  YOB: 1985  Date of evaluation: 10/26/2020  Primary Care Physician: Brittany Davis MD  Patient evaluated at the request of  Dr. Radha Moran  Reason for evaluation: lipomas  IMPRESSIONS:       ICD-10-CM    1. Pre-op testing  Z01.818 COVID-19 Ambulatory   2. Lipoma of left upper extremity  D17.22 EXCISION BENIGN SKIN LESION TRUNK / ARM / LEG   1.   2. does not have a problem list on file. PLANS:   1. Risk benefits and alternatives to surgery were explained with patient he would like to proceed with excision of left forearm lipoma. I did review with him these are most likely benign and there is always a very small chance that any mass removed to be malignant will be sent to pathology. SUBJECTIVE:   CC: Arm bumps    History of Chief Complaint:    Bob Camejo is a 27 y. o.male who sees me today with complaints of enlarging left arm masses. He initially noticed them 2 years ago but they have been steadily increasing in size. They cause discomfort when he rests his arm on tables. He is never had any bleeding or drainage from the lesions. No alleviating or aggravating factors. Past Medical History  Past Medical History:   Diagnosis Date    Hep C w/ coma, chronic (HCC)        Past Surgical History  Past Surgical History:   Procedure Laterality Date    ORBITAL RIM RECONSTRUCTION  2009    SHOULDER CLOSED REDUCTION         Medications  No current facility-administered medications on file prior to visit. Current Outpatient Medications on File Prior to Visit   Medication Sig Dispense Refill    OXcarbazepine (TRILEPTAL) 300 MG tablet Take 300 mg by mouth daily      buprenorphine-naloxone (SUBOXONE) 8-2 MG FILM SL film Place 1 Film under the tongue 2 times daily.          Allergies  Asa [aspirin]    Family History      Problem Relation Age of Onset    No Known Problems Mother     Alcohol Abuse Father     Liver Disease Father         Social History  Social History     Socioeconomic History    Marital status: Single     Spouse name: Not on file    Number of children: Not on file    Years of education: Not on file    Highest education level: Not on file   Occupational History    Not on file   Social Needs    Financial resource strain: Not on file    Food insecurity     Worry: Not on file     Inability: Not on file    Transportation needs     Medical: Not on file     Non-medical: Not on file   Tobacco Use    Smoking status: Former Smoker     Packs/day: 1.50    Smokeless tobacco: Former User   Substance and Sexual Activity    Alcohol use: No    Drug use: No    Sexual activity: Yes     Partners: Female   Lifestyle    Physical activity     Days per week: Not on file     Minutes per session: Not on file    Stress: Not on file   Relationships    Social connections     Talks on phone: Not on file     Gets together: Not on file     Attends Mormonism service: Not on file     Active member of club or organization: Not on file     Attends meetings of clubs or organizations: Not on file     Relationship status: Not on file    Intimate partner violence     Fear of current or ex partner: Not on file     Emotionally abused: Not on file     Physically abused: Not on file     Forced sexual activity: Not on file   Other Topics Concern    Not on file   Social History Narrative    Not on file        Review of Systems:  Review of Systems   Constitutional: Negative for activity change and fatigue. HENT: Negative. Gastrointestinal: Negative. Skin:        Enlarging left arm mass   Neurological: Negative. Psychiatric/Behavioral: Negative. OBJECTIVE:   CURRENT VITALS:  height is 5' 9\" (1.753 m) and weight is 193 lb 3.2 oz (87.6 kg). His tympanic temperature is 97.5 °F (36.4 °C). His blood pressure is 122/74 and his pulse is 69. His respiration is 14 and oxygen saturation is 98%.   Body mass index is 28.53 kg/m². Physical Exam  Vitals signs and nursing note reviewed. Constitutional:       Appearance: Normal appearance. Eyes:      Extraocular Movements: Extraocular movements intact. Pupils: Pupils are equal, round, and reactive to light. Cardiovascular:      Rate and Rhythm: Normal rate. Pulses: Normal pulses. Pulmonary:      Effort: Pulmonary effort is normal. No respiratory distress. Musculoskeletal: Normal range of motion. Comments: Left forearm there is multilobulated mobile rubbery mass measuring 3 x 2.1 cm    Skin:     General: Skin is warm and dry. Neurological:      General: No focal deficit present. Mental Status: He is alert and oriented to person, place, and time. Psychiatric:         Mood and Affect: Mood normal.         Behavior: Behavior normal.         Thought Content: Thought content normal.         LABS:   No results for input(s): WBC, HGB, HCT, PLT, NA, K, CL, CO2, BUN, CREATININE, MG, PHOS, CALCIUM, PTT, INR, AST, ALT, BILITOT, BILIDIR, AMYLASE, LIPASE, LDH, LACTA, NITRU, COLORU, BACTERIA in the last 72 hours. Invalid input(s): PT, WBCU, RBCU, LEUKOCYTESUA  RADIOLOGY:   I have personally reviewed the following films:  No orders to display       Thank you for the interesting evaluation. Further recommendations to follow.     Electronically signed by Jing Looney MD on 10/26/2020 at 8:26 AM

## 2020-10-27 ENCOUNTER — TELEPHONE (OUTPATIENT)
Dept: SURGERY | Age: 35
End: 2020-10-27

## 2020-10-27 NOTE — TELEPHONE ENCOUNTER
S/p 10/26 excision left lower forearm lipoma     Attempted to contact pt post op- no answer- left appropriate VM with return phone number.

## 2020-10-27 NOTE — OP NOTE
Operative Note      Patient: Donald Phelps  YOB: 1985  MRN: [id number]    Date of Procedure: 10/26/2020    Pre-Op Diagnosis: LEFT LOWER FOREARM LIPOMA    Post-Op Diagnosis: Same       Procedure(s):  EXCISION LEFT LOWER FOREARM LIPOMA    Surgeon(s): Clifton Hernandez MD    Assistant:   * No surgical staff found *    Anesthesia: Monitor Anesthesia Care    Estimated Blood Loss (mL): Minimal    Complications: None    Specimens:   ID Type Source Tests Collected by Time Destination   A : Left Lower Arm Lipoma Tissue Arm SURGICAL PATHOLOGY Clifton Hernandez MD 10/26/2020 5759        Implants:  * No implants in log *      Drains: none    Findings: multilobulated fatty tumor    Detailed Description of Procedure:   He was seen in the preoperative holding room where informed consents was reviewed. He was taken to the OR, after adequate anesthesia he was prepped and draped in the normal sterile fashion and a formal timeout was performed. The skin and subcutaneous tissue was infiltrated with local anesthetic. A vertical incision was made over the mass that measured 5cm. This was deepened with cautery. The well demarkated fatty mass was then dissected out circumfrenctially, it was superficial to the fascia. There were multiple lobulues and two separate masses. These were completely removed. The wound was irrigated any bleeding was controlled with cautery. The wound was then closed in layers with deep dermal interrupted vicryl and running subcuticular vicryl. Steri strips and dressing were placed. He was awoken from anesthesia and transported to the recovery room in stable condition. All sponge and needle counts were correct.        Electronically signed by Clifton Hernandez MD on 10/27/2020 at 1:46 PM

## 2020-11-10 ENCOUNTER — OFFICE VISIT (OUTPATIENT)
Dept: SURGERY | Age: 35
End: 2020-11-10

## 2020-11-10 VITALS
DIASTOLIC BLOOD PRESSURE: 75 MMHG | HEIGHT: 69 IN | RESPIRATION RATE: 15 BRPM | SYSTOLIC BLOOD PRESSURE: 118 MMHG | OXYGEN SATURATION: 97 % | HEART RATE: 67 BPM | TEMPERATURE: 98.7 F | WEIGHT: 195.8 LBS | BODY MASS INDEX: 29 KG/M2

## 2020-11-10 PROCEDURE — 99024 POSTOP FOLLOW-UP VISIT: CPT | Performed by: SURGERY

## 2020-11-11 NOTE — PROGRESS NOTES
MD Matthew Darby 238 Post op Note    Pt Name: Valentin Slaughter  Medical Record Number: [id number]  Date of Birth 1985   Today's Date: 11/11/2020    ASSESSMENT       ICD-10-CM    1. Lipoma of left upper extremity  D17.22    2. Postop check  Z09         PLAN   1. He is making a good recovery, he can see me back as needed  2. He does have other lipomas that he said he might want out in the future. Scott Reagan is doing well, no complaints, no pian, no drainage from wound. .            CURRENT MEDICATIONS     Current Outpatient Medications on File Prior to Visit   Medication Sig Dispense Refill    albuterol sulfate (PROAIR RESPICLICK) 017 (90 Base) MCG/ACT aerosol powder inhalation Inhale 2 puffs into the lungs every 4 hours as needed for Wheezing or Shortness of Breath      OXcarbazepine (TRILEPTAL) 300 MG tablet Take 300 mg by mouth daily      buprenorphine-naloxone (SUBOXONE) 8-2 MG FILM SL film Place 1 Film under the tongue 2 times daily. No current facility-administered medications on file prior to visit. OBJECTIVE   CURRENT VITALS:  height is 5' 9\" (1.753 m) and weight is 195 lb 12.8 oz (88.8 kg). His tympanic temperature is 98.7 °F (37.1 °C). His blood pressure is 118/75 and his pulse is 67. His respiration is 15 and oxygen saturation is 97%. Alert oriented and appropriate  Left arm incision is clean dry and intact no hematoma no seroma no signs of infection including erythema or induration.     LABS and Pathology   Consistent with lipoma    RADIOLOGY   na    Electronically signed by Raul Hernandez MD on 11/11/2020 at 7:56 AM

## 2020-12-05 ENCOUNTER — APPOINTMENT (OUTPATIENT)
Dept: GENERAL RADIOLOGY | Age: 35
End: 2020-12-05
Payer: COMMERCIAL

## 2020-12-05 ENCOUNTER — HOSPITAL ENCOUNTER (EMERGENCY)
Age: 35
Discharge: HOME | End: 2020-12-05
Payer: COMMERCIAL

## 2020-12-05 VITALS
OXYGEN SATURATION: 97 % | SYSTOLIC BLOOD PRESSURE: 143 MMHG | TEMPERATURE: 98 F | WEIGHT: 196 LBS | DIASTOLIC BLOOD PRESSURE: 78 MMHG | BODY MASS INDEX: 28.94 KG/M2 | RESPIRATION RATE: 16 BRPM | HEART RATE: 71 BPM

## 2020-12-05 PROCEDURE — 87880 STREP A ASSAY W/OPTIC: CPT

## 2020-12-05 PROCEDURE — 87804 INFLUENZA ASSAY W/OPTIC: CPT

## 2020-12-05 PROCEDURE — 99282 EMERGENCY DEPT VISIT SF MDM: CPT

## 2020-12-05 PROCEDURE — 71045 X-RAY EXAM CHEST 1 VIEW: CPT

## 2020-12-05 PROCEDURE — 87070 CULTURE OTHR SPECIMN AEROBIC: CPT

## 2020-12-05 PROCEDURE — U0003 INFECTIOUS AGENT DETECTION BY NUCLEIC ACID (DNA OR RNA); SEVERE ACUTE RESPIRATORY SYNDROME CORONAVIRUS 2 (SARS-COV-2) (CORONAVIRUS DISEASE [COVID-19]), AMPLIFIED PROBE TECHNIQUE, MAKING USE OF HIGH THROUGHPUT TECHNOLOGIES AS DESCRIBED BY CMS-2020-01-R: HCPCS

## 2020-12-05 ASSESSMENT — ENCOUNTER SYMPTOMS
SINUS PRESSURE: 0
DIARRHEA: 0
VOMITING: 0
ABDOMINAL DISTENTION: 0
ABDOMINAL PAIN: 0
SINUS PAIN: 0
COUGH: 1
COLOR CHANGE: 0
SHORTNESS OF BREATH: 1
SORE THROAT: 1
NAUSEA: 0
BACK PAIN: 0

## 2020-12-05 NOTE — ED PROVIDER NOTES
into the lungs every 4 hours as needed for Wheezing or Shortness of BreathHistorical Med      buprenorphine-naloxone (SUBOXONE) 8-2 MG FILM SL film Place 1 Film under the tongue 2 times daily. Historical Med      OXcarbazepine (TRILEPTAL) 300 MG tablet Take 300 mg by mouth dailyHistorical Med             ALLERGIES     is allergic to asa [aspirin]. FAMILY HISTORY     He indicated that his mother is . He indicated that his father is alive. family history includes Alcohol Abuse in his father; Liver Disease in his father; No Known Problems in his mother. SOCIAL HISTORY      reports that he has quit smoking. He smoked 1.50 packs per day. He has quit using smokeless tobacco. He reports that he does not drink alcohol or use drugs. PHYSICAL EXAM     INITIAL VITALS:  weight is 196 lb (88.9 kg). His oral temperature is 98 °F (36.7 °C). His blood pressure is 143/78 (abnormal) and his pulse is 71. His respiration is 16 and oxygen saturation is 97%. Physical Exam  Constitutional:       General: He is not in acute distress. Appearance: He is normal weight. He is not ill-appearing. HENT:      Head: Normocephalic. Nose: Congestion and rhinorrhea present. Mouth/Throat:      Mouth: Mucous membranes are moist.      Pharynx: No oropharyngeal exudate. Cardiovascular:      Rate and Rhythm: Normal rate and regular rhythm. Pulses: Normal pulses. Heart sounds: Normal heart sounds. Pulmonary:      Effort: Pulmonary effort is normal.   Skin:     General: Skin is warm and dry. Capillary Refill: Capillary refill takes less than 2 seconds. Coloration: Skin is not jaundiced or pale. Neurological:      General: No focal deficit present. Mental Status: He is alert.    Psychiatric:         Mood and Affect: Mood normal.         Behavior: Behavior normal.          DIFFERENTIAL DIAGNOSIS:   Viral illness, influenza, COVID-19, strep pharyngitis, viral pharyngitis    DIAGNOSTIC RESULTS EKG: All EKG's are interpreted by the Emergency Department Physician who either signs or Co-signs this chart in the absence of a cardiologist.    None    RADIOLOGY: non-plainfilm images(s) such as CT, Ultrasound and MRI are read by the radiologist.    XR CHEST PORTABLE   Final Result   1. Unremarkable AP portable chest radiograph. **This report has been created using voice recognition software. It may contain minor errors which are inherent in voice recognition technology. **      Final report electronically signed by Dr. Evie Paniagua on 12/5/2020 6:59 PM          LABS:     Labs Reviewed   RAPID INFLUENZA A/B ANTIGENS   CULTURE, THROAT    Narrative:     Source: throat       Site:           Current Antibiotics: not stated   GROUP A STREP, REFLEX   COVID-19       EMERGENCY DEPARTMENT COURSE:   Vitals:    Vitals:    12/05/20 1721   BP: (!) 143/78   Pulse: 71   Resp: 16   Temp: 98 °F (36.7 °C)   TempSrc: Oral   SpO2: 97%   Weight: 196 lb (88.9 kg)       6:47 PM EST: The patient was seen and evaluated. Appropriate labs and imaging are ordered. Patient's strep is negative, influenza test negative. Chest x-ray shows no active disease. MDM:  Patient has symptoms of COVID-19. Results would not be available for 72 to 96 hours. Patient will be discharged home as he has no respiratory distress. Other testing negative today. Patient advised to continue social distancing. Drink plenty of fluids. Tylenol for body aches or fever. Recommend vitamin C, vitamin D, zinc to help with symptoms. Good handwashing. Follow-up primary care provider if no improvement 3 to 5 days. Return if worse. Patient verbalized understanding of instructions. CRITICAL CARE:   None    CONSULTS:  None    PROCEDURES:  None    FINAL IMPRESSION      1. Viral URI with cough    2.  Encounter for laboratory testing for COVID-19 virus          DISPOSITION/PLAN   discharge    PATIENT REFERRED TO:  Esperanza Reyes MD  [address] [place]  [phone]            DISCHARGE MEDICATIONS:  Discharge Medication List as of 12/5/2020  7:31 PM          (Please note that portions of this note were completed with a voice recognition program.  Efforts were made to edit the dictations but occasionally words are mis-transcribed.)    The patient was given an opportunity to see the Emergency Attending. The patient voiced understanding that I was a Mid-LevelProvider and was in agreement with being seen independently by myself.           Aishwarya Lerner, CHRISTINE - CNP  12/06/20 2032

## 2020-12-07 ENCOUNTER — CARE COORDINATION (OUTPATIENT)
Dept: CARE COORDINATION | Age: 35
End: 2020-12-07

## 2020-12-07 LAB
PERFORMING LAB: NORMAL
REPORT: NORMAL
SARS-COV-2: NOT DETECTED
THROAT/NOSE CULTURE: NORMAL

## 2020-12-07 NOTE — CARE COORDINATION
Patient contacted regarding Ester Mcdowell. Discussed COVID-19 related testing which was pending at this time. Test results were pending. Patient informed of results, if available? Results pending    Care Transition Nurse/ Ambulatory Care Manager contacted the spouse by telephone to perform post discharge assessment. Call within 2 business days of discharge: Yes. Verified name and  with spouse as identifiers. Provided introduction to self, and explanation of the CTN/ACM role, and reason for call due to risk factors for infection and/or exposure to COVID-19. Symptoms reviewed with spouse who verbalized the following symptoms: fatigue, cough, no new symptoms, no worsening symptoms and nasal congestion. Due to no new or worsening symptoms encounter was not routed to provider for escalation. Discussed follow-up appointments. If no appointment was previously scheduled, appointment scheduling offered: [facility] follow up appointment(s): No future appointments. Non-[facility] follow up appointment(s): recommended f/u with PCP    Non-face-to-face services provided:  Obtained and reviewed discharge summary and/or continuity of care documents     Advance Care Planning:   Does patient have an Advance Directive:  decision maker updated. Patient has following risk factors of: no known risk factors. CTN/ACM reviewed discharge instructions, medical action plan and red flags such as increased shortness of breath, increasing fever and signs of decompensation with spouse who verbalized understanding. Discussed exposure protocols and quarantine with CDC Guidelines What to do if you are sick with coronavirus disease 2019.  spouse was given an opportunity for questions and concerns. The spouse agrees to contact the [facility] exposure line [phone], [facility] department [address]: ([phone]) and PCP office for questions related to their healthcare.  CTN/ACM provided contact information for future

## 2021-12-03 ENCOUNTER — HOSPITAL ENCOUNTER (OUTPATIENT)
Age: 36
Setting detail: SPECIMEN
Discharge: HOME | End: 2021-12-03

## 2021-12-03 LAB
ABSOLUTE EOS #: 0.19 K/UL (ref 0–0.44)
ABSOLUTE IMMATURE GRANULOCYTE: 0.03 K/UL (ref 0–0.3)
ABSOLUTE LYMPH #: 2.7 K/UL (ref 1.1–3.7)
ABSOLUTE MONO #: 0.39 K/UL (ref 0.1–1.2)
ALBUMIN SERPL-MCNC: 4.3 G/DL (ref 3.5–5.2)
ALBUMIN/GLOBULIN RATIO: 1.6 (ref 1–2.5)
ALP BLD-CCNC: 51 U/L (ref 40–129)
ALT SERPL-CCNC: 22 U/L (ref 5–41)
ANION GAP SERPL CALCULATED.3IONS-SCNC: 12 MMOL/L (ref 9–17)
AST SERPL-CCNC: 26 U/L
BASOPHILS # BLD: 1 % (ref 0–2)
BASOPHILS ABSOLUTE: 0.05 K/UL (ref 0–0.2)
BILIRUB SERPL-MCNC: 0.26 MG/DL (ref 0.3–1.2)
BUN BLDV-MCNC: 8 MG/DL (ref 6–20)
BUN/CREAT BLD: ABNORMAL (ref 9–20)
CALCIUM SERPL-MCNC: 9.1 MG/DL (ref 8.6–10.4)
CHLORIDE BLD-SCNC: 100 MMOL/L (ref 98–107)
CHOLESTEROL, FASTING: 262 MG/DL
CHOLESTEROL/HDL RATIO: 6.9
CO2: 22 MMOL/L (ref 20–31)
CREAT SERPL-MCNC: 1.07 MG/DL (ref 0.7–1.2)
DIFFERENTIAL TYPE: ABNORMAL
EOSINOPHILS RELATIVE PERCENT: 3 % (ref 1–4)
GFR AFRICAN AMERICAN: >60 ML/MIN
GFR NON-AFRICAN AMERICAN: >60 ML/MIN
GFR SERPL CREATININE-BSD FRML MDRD: ABNORMAL ML/MIN/{1.73_M2}
GFR SERPL CREATININE-BSD FRML MDRD: ABNORMAL ML/MIN/{1.73_M2}
GLUCOSE BLD-MCNC: 92 MG/DL (ref 70–99)
HCT VFR BLD CALC: 46.1 % (ref 40.7–50.3)
HDLC SERPL-MCNC: 38 MG/DL
HEMOGLOBIN: 15.2 G/DL (ref 13–17)
IMMATURE GRANULOCYTES: 1 %
LDL CHOLESTEROL: 175 MG/DL (ref 0–130)
LYMPHOCYTES # BLD: 42 % (ref 24–43)
MCH RBC QN AUTO: 30.7 PG (ref 25.2–33.5)
MCHC RBC AUTO-ENTMCNC: 33 G/DL (ref 28.4–34.8)
MCV RBC AUTO: 93.1 FL (ref 82.6–102.9)
MONOCYTES # BLD: 6 % (ref 3–12)
NRBC AUTOMATED: 0 PER 100 WBC
PDW BLD-RTO: 13.2 % (ref 11.8–14.4)
PLATELET # BLD: 341 K/UL (ref 138–453)
PLATELET ESTIMATE: ABNORMAL
PMV BLD AUTO: 9.9 FL (ref 8.1–13.5)
POTASSIUM SERPL-SCNC: 4.3 MMOL/L (ref 3.7–5.3)
RBC # BLD: 4.95 M/UL (ref 4.21–5.77)
RBC # BLD: ABNORMAL 10*6/UL
SEG NEUTROPHILS: 47 % (ref 36–65)
SEGMENTED NEUTROPHILS ABSOLUTE COUNT: 3.04 K/UL (ref 1.5–8.1)
SODIUM BLD-SCNC: 134 MMOL/L (ref 135–144)
TOTAL PROTEIN: 7 G/DL (ref 6.4–8.3)
TRIGLYCERIDE, FASTING: 245 MG/DL
TSH SERPL DL<=0.05 MIU/L-ACNC: 1.12 MIU/L (ref 0.3–5)
VLDLC SERPL CALC-MCNC: ABNORMAL MG/DL (ref 1–30)
WBC # BLD: 6.4 K/UL (ref 3.5–11.3)
WBC # BLD: ABNORMAL 10*3/UL

## 2021-12-17 ENCOUNTER — TELEPHONE (OUTPATIENT)
Dept: SURGERY | Age: 36
End: 2021-12-17

## 2021-12-17 ENCOUNTER — PREP FOR PROCEDURE (OUTPATIENT)
Dept: SURGERY | Age: 36
End: 2021-12-17

## 2021-12-17 ENCOUNTER — OFFICE VISIT (OUTPATIENT)
Dept: SURGERY | Age: 36
End: 2021-12-17
Payer: COMMERCIAL

## 2021-12-17 VITALS
OXYGEN SATURATION: 97 % | HEIGHT: 69 IN | HEART RATE: 83 BPM | RESPIRATION RATE: 18 BRPM | WEIGHT: 221.8 LBS | DIASTOLIC BLOOD PRESSURE: 80 MMHG | TEMPERATURE: 98.5 F | BODY MASS INDEX: 32.85 KG/M2 | SYSTOLIC BLOOD PRESSURE: 128 MMHG

## 2021-12-17 DIAGNOSIS — D17.1 LIPOMA OF TORSO: Primary | ICD-10-CM

## 2021-12-17 PROCEDURE — 99212 OFFICE O/P EST SF 10 MIN: CPT | Performed by: SURGERY

## 2021-12-17 PROCEDURE — G8427 DOCREV CUR MEDS BY ELIG CLIN: HCPCS | Performed by: SURGERY

## 2021-12-17 PROCEDURE — G8484 FLU IMMUNIZE NO ADMIN: HCPCS | Performed by: SURGERY

## 2021-12-17 PROCEDURE — G8417 CALC BMI ABV UP PARAM F/U: HCPCS | Performed by: SURGERY

## 2021-12-17 PROCEDURE — 4004F PT TOBACCO SCREEN RCVD TLK: CPT | Performed by: SURGERY

## 2021-12-17 RX ORDER — ARIPIPRAZOLE 5 MG/1
5 TABLET ORAL DAILY
Status: ON HOLD | COMMUNITY
End: 2022-04-01 | Stop reason: HOSPADM

## 2021-12-17 RX ORDER — SODIUM CHLORIDE 9 MG/ML
INJECTION, SOLUTION INTRAVENOUS CONTINUOUS
Status: CANCELLED | OUTPATIENT
Start: 2021-12-17

## 2021-12-17 RX ORDER — VENLAFAXINE 75 MG/1
75 TABLET ORAL DAILY
Status: ON HOLD | COMMUNITY
End: 2022-03-27

## 2021-12-17 RX ORDER — HYDROXYZINE PAMOATE 50 MG/1
50 CAPSULE ORAL 2 TIMES DAILY
Status: ON HOLD | COMMUNITY
End: 2022-04-01 | Stop reason: HOSPADM

## 2021-12-17 RX ORDER — TRAZODONE HYDROCHLORIDE 50 MG/1
50 TABLET ORAL NIGHTLY
Status: ON HOLD | COMMUNITY
End: 2022-04-01 | Stop reason: HOSPADM

## 2021-12-17 NOTE — TELEPHONE ENCOUNTER
[address]    Phone * [phone]   Surgical Scheduling Direct line Phone * [phone]  Fax * [phone]      Janae Fraga      1985    male    [address]   Marital Status:    Single     Home Phone: [phone]   Cell Phone:   Telephone Information:   Mobile [phone]              Surgeon: Dr. Marj Miller  Surgery Date:01-   Time: BREANNE Carrasco    Procedure: Excision lipoma lower back    Outpatient     Diagnosis: Lipoma    Important Medical History: In Epic    Special Inst/Equip:     CPT Codes: 68220    Latex Allergy:   no Cardiac Device:  no    Anesthesia Type: MAC    Case Location:  Main OR     Preadmission Testing: Phone Call      PAT Date and Time: ________________________________    PAT Confirmation #: _________________________________    Post Op Visit:  ______________________________________    Need Preop Cardiac Clearance:   no    Does patient have Cardiologist/physician?  No      Surgery Conformation #:  ______________________________________________    : __________________________________ Date:____________________        [employer] Name:  Nhi Meyers

## 2021-12-17 NOTE — TELEPHONE ENCOUNTER
Per [address] [place] [address] website no prior authorization required for CPT code 88028

## 2021-12-17 NOTE — TELEPHONE ENCOUNTER
Patient scheduled for surgery with Dr. Parul Dumont on 01- at 12:30pm with an arrival of 11:00am.  Preop instructions and antibacterial soap given. Surgery consent signed.

## 2021-12-21 NOTE — PROGRESS NOTES
Gutierrez Benoit MD  [facility] H&P    Pt Name: Yecenia Esparza  MRN: [id number]  YOB: 1985  Date of evaluation: 12/31/2021  Primary Care Physician: Cathy Nguyen MD  Patient evaluated at the request of self  Reason for evaluation: lipoma  IMPRESSIONS:       ICD-10-CM    1. Lipoma of torso  D17.1      does not have a problem list on file. PLANS:   1. Patient with symptomatic back lipoma is uncomfortable when he leans against it. It has gotten slightly enlarged in size. Patient is not adequate candidate for excision. We will perform this at his convenience either in the office versus in the OR. SUBJECTIVE:   CC: Probe    History of Chief Complaint:    Onelia Sanchez is a 39 y.o.male who has a history of lipomas have excised myself. He comes in with one on his back gotten larger is causing him discomfort. It is mild to moderate in severity. Past Medical History  Past Medical History:   Diagnosis Date    Hep C w/ coma, chronic        Past Surgical History  Past Surgical History:   Procedure Laterality Date    ARM SURGERY Left 10/26/2020    EXCISION LEFT LOWER FOREARM LIPOMA performed by Trell Wagner MD at [facility]  2009    SHOULDER CLOSED REDUCTION         Medications  Current Outpatient Medications on File Prior to Visit   Medication Sig Dispense Refill    venlafaxine (EFFEXOR) 75 MG tablet Take 75 mg by mouth daily      hydrOXYzine (VISTARIL) 50 MG capsule Take 50 mg by mouth 2 times daily      ARIPiprazole (ABILIFY) 5 MG tablet Take 5 mg by mouth daily      traZODone (DESYREL) 50 MG tablet Take 50 mg by mouth nightly       No current facility-administered medications on file prior to visit.        Allergies  Asa [aspirin]    Family History      Problem Relation Age of Onset    No Known Problems Mother     Alcohol Abuse Father     Liver Disease Father         Social History  Social History     Socioeconomic History    Marital status: Single Spouse name: Not on file    Number of children: Not on file    Years of education: Not on file    Highest education level: Not on file   Occupational History    Not on file   Tobacco Use    Smoking status: Current Every Day Smoker     Packs/day: 0.50     Types: Cigarettes    Smokeless tobacco: Current User     Types: Chew   Vaping Use    Vaping Use: Never used   Substance and Sexual Activity    Alcohol use: No    Drug use: No    Sexual activity: Yes     Partners: Female   Other Topics Concern    Not on file   Social History Narrative    Not on file     Social Determinants of Health     Financial Resource Strain:     Difficulty of Paying Living Expenses: Not on file   Food Insecurity:     Worried About Running Out of Food in the Last Year: Not on file    Krupa of Food in the Last Year: Not on file   Transportation Needs:     Lack of Transportation (Medical): Not on file    Lack of Transportation (Non-Medical): Not on file   Physical Activity:     Days of Exercise per Week: Not on file    Minutes of Exercise per Session: Not on file   Stress:     Feeling of Stress : Not on file   Social Connections:     Frequency of Communication with Friends and Family: Not on file    Frequency of Social Gatherings with Friends and Family: Not on file    Attends Mandaen Services: Not on file    Active Member of [address] [place] [address] [employer] or Organizations: Not on file    Attends Club or Organization Meetings: Not on file    Marital Status: Not on file   Intimate Partner Violence:     Fear of Current or Ex-Partner: Not on file    Emotionally Abused: Not on file    Physically Abused: Not on file    Sexually Abused: Not on file   Housing Stability:     Unable to Pay for Housing in the Last Year: Not on file    Number of [place] in the Last Year: Not on file    Unstable Housing in the Last Year: Not on file        Review of Systems:  Review of Systems   Constitutional: Negative. HENT: Negative.     Cardiovascular: Negative. Gastrointestinal: Negative. Genitourinary: Negative. Skin:        Painful mass on his back   Hematological: Negative. OBJECTIVE:   CURRENT VITALS:  height is 5' 9\" (1.753 m) and weight is 221 lb 12.8 oz (100.6 kg). His temporal temperature is 98.5 °F (36.9 °C). His blood pressure is 128/80 and his pulse is 83. His respiration is 18 and oxygen saturation is 97%. Body mass index is 32.75 kg/m². Physical Exam  Constitutional:       Appearance: Normal appearance. He is not ill-appearing. Eyes:      Extraocular Movements: Extraocular movements intact. Pupils: Pupils are equal, round, and reactive to light. Cardiovascular:      Rate and Rhythm: Normal rate. Pulses: Normal pulses. Heart sounds: No murmur heard. Pulmonary:      Effort: Pulmonary effort is normal. No respiratory distress. Abdominal:      General: There is no distension. Palpations: Abdomen is soft. Skin:     General: Skin is warm and dry. Comments: Lower left back there is a rubbery mass consistent with lipoma   Neurological:      General: No focal deficit present. Mental Status: He is alert and oriented to person, place, and time. Psychiatric:         Mood and Affect: Mood normal.         Behavior: Behavior normal.         LABS:   No results for input(s): WBC, HGB, HCT, PLT, NA, K, CL, CO2, BUN, CREATININE, MG, PHOS, CALCIUM, PTT, INR, AST, ALT, BILITOT, BILIDIR, AMYLASE, LIPASE, LDH, LACTA, NITRU, COLORU, BACTERIA in the last 72 hours. Invalid input(s): PT, WBCU, RBCU, LEUKOCYTESUA  RADIOLOGY:   I have personally reviewed the following films:  No orders to display       Thank you for the interesting evaluation. Further recommendations to follow.     Electronically signed by Moris Wong MD on 12/31/2021 at 12:12 PM

## 2022-01-04 ENCOUNTER — TELEPHONE (OUTPATIENT)
Dept: SURGERY | Age: 37
End: 2022-01-04

## 2022-01-04 NOTE — TELEPHONE ENCOUNTER
Patient notified of new surgery arrival time. Patient is to be at [address] [place] [address] Drive day surgery by  8:30am   on  01-. Patient reminded to have nothing to eat or drink after midnight. Patient voiced understanding.

## 2022-01-05 ENCOUNTER — ANESTHESIA EVENT (OUTPATIENT)
Dept: OPERATING ROOM | Age: 37
End: 2022-01-05
Payer: COMMERCIAL

## 2022-01-05 ENCOUNTER — HOSPITAL ENCOUNTER (OUTPATIENT)
Age: 37
Setting detail: OUTPATIENT SURGERY
Discharge: HOME | End: 2022-01-05
Attending: SURGERY | Admitting: SURGERY
Payer: COMMERCIAL

## 2022-01-05 ENCOUNTER — ANESTHESIA (OUTPATIENT)
Dept: OPERATING ROOM | Age: 37
End: 2022-01-05
Payer: COMMERCIAL

## 2022-01-05 VITALS — DIASTOLIC BLOOD PRESSURE: 55 MMHG | TEMPERATURE: 97 F | OXYGEN SATURATION: 94 % | SYSTOLIC BLOOD PRESSURE: 96 MMHG

## 2022-01-05 VITALS
OXYGEN SATURATION: 96 % | TEMPERATURE: 96.9 F | SYSTOLIC BLOOD PRESSURE: 135 MMHG | BODY MASS INDEX: 32.29 KG/M2 | HEIGHT: 69 IN | RESPIRATION RATE: 16 BRPM | DIASTOLIC BLOOD PRESSURE: 86 MMHG | WEIGHT: 218 LBS | HEART RATE: 81 BPM

## 2022-01-05 DIAGNOSIS — D17.1 LIPOMA OF BACK: Primary | ICD-10-CM

## 2022-01-05 PROCEDURE — 2500000003 HC RX 250 WO HCPCS: Performed by: SURGERY

## 2022-01-05 PROCEDURE — 6360000002 HC RX W HCPCS

## 2022-01-05 PROCEDURE — 3700000000 HC ANESTHESIA ATTENDED CARE: Performed by: SURGERY

## 2022-01-05 PROCEDURE — 7100000011 HC PHASE II RECOVERY - ADDTL 15 MIN: Performed by: SURGERY

## 2022-01-05 PROCEDURE — 2709999900 HC NON-CHARGEABLE SUPPLY: Performed by: SURGERY

## 2022-01-05 PROCEDURE — 6370000000 HC RX 637 (ALT 250 FOR IP)

## 2022-01-05 PROCEDURE — 7100000010 HC PHASE II RECOVERY - FIRST 15 MIN: Performed by: SURGERY

## 2022-01-05 PROCEDURE — 3600000012 HC SURGERY LEVEL 2 ADDTL 15MIN: Performed by: SURGERY

## 2022-01-05 PROCEDURE — 88304 TISSUE EXAM BY PATHOLOGIST: CPT

## 2022-01-05 PROCEDURE — 2580000003 HC RX 258

## 2022-01-05 PROCEDURE — 21931 EXC BACK LES SC 3 CM/>: CPT | Performed by: SURGERY

## 2022-01-05 PROCEDURE — 3600000002 HC SURGERY LEVEL 2 BASE: Performed by: SURGERY

## 2022-01-05 PROCEDURE — 3700000001 HC ADD 15 MINUTES (ANESTHESIA): Performed by: SURGERY

## 2022-01-05 RX ORDER — HYDROCODONE BITARTRATE AND ACETAMINOPHEN 5; 325 MG/1; MG/1
1 TABLET ORAL ONCE
Status: COMPLETED | OUTPATIENT
Start: 2022-01-05 | End: 2022-01-05

## 2022-01-05 RX ORDER — PROPOFOL 10 MG/ML
INJECTION, EMULSION INTRAVENOUS PRN
Status: DISCONTINUED | OUTPATIENT
Start: 2022-01-05 | End: 2022-01-05

## 2022-01-05 RX ORDER — MIDAZOLAM HYDROCHLORIDE 1 MG/ML
INJECTION INTRAMUSCULAR; INTRAVENOUS PRN
Status: DISCONTINUED | OUTPATIENT
Start: 2022-01-05 | End: 2022-01-05 | Stop reason: SDUPTHER

## 2022-01-05 RX ORDER — LIDOCAINE HYDROCHLORIDE 10 MG/ML
INJECTION, SOLUTION EPIDURAL; INFILTRATION; INTRACAUDAL; PERINEURAL PRN
Status: DISCONTINUED | OUTPATIENT
Start: 2022-01-05 | End: 2022-01-05 | Stop reason: ALTCHOICE

## 2022-01-05 RX ORDER — PROPOFOL 10 MG/ML
INJECTION, EMULSION INTRAVENOUS PRN
Status: DISCONTINUED | OUTPATIENT
Start: 2022-01-05 | End: 2022-01-05 | Stop reason: SDUPTHER

## 2022-01-05 RX ORDER — PROPOFOL 10 MG/ML
INJECTION, EMULSION INTRAVENOUS CONTINUOUS PRN
Status: DISCONTINUED | OUTPATIENT
Start: 2022-01-05 | End: 2022-01-05 | Stop reason: SDUPTHER

## 2022-01-05 RX ORDER — SODIUM CHLORIDE 9 MG/ML
INJECTION, SOLUTION INTRAVENOUS CONTINUOUS PRN
Status: DISCONTINUED | OUTPATIENT
Start: 2022-01-05 | End: 2022-01-05 | Stop reason: SDUPTHER

## 2022-01-05 RX ORDER — BUPIVACAINE HYDROCHLORIDE 5 MG/ML
INJECTION, SOLUTION PERINEURAL PRN
Status: DISCONTINUED | OUTPATIENT
Start: 2022-01-05 | End: 2022-01-05 | Stop reason: ALTCHOICE

## 2022-01-05 RX ORDER — HYDROCODONE BITARTRATE AND ACETAMINOPHEN 5; 325 MG/1; MG/1
1 TABLET ORAL EVERY 6 HOURS PRN
Qty: 10 TABLET | Refills: 0 | Status: SHIPPED | OUTPATIENT
Start: 2022-01-05 | End: 2022-01-08

## 2022-01-05 RX ORDER — SODIUM CHLORIDE 9 MG/ML
INJECTION, SOLUTION INTRAVENOUS CONTINUOUS
Status: DISCONTINUED | OUTPATIENT
Start: 2022-01-05 | End: 2022-01-05 | Stop reason: HOSPADM

## 2022-01-05 RX ORDER — HYDROCODONE BITARTRATE AND ACETAMINOPHEN 5; 325 MG/1; MG/1
TABLET ORAL
Status: COMPLETED
Start: 2022-01-05 | End: 2022-01-05

## 2022-01-05 RX ORDER — FENTANYL CITRATE 50 UG/ML
INJECTION, SOLUTION INTRAMUSCULAR; INTRAVENOUS PRN
Status: DISCONTINUED | OUTPATIENT
Start: 2022-01-05 | End: 2022-01-05 | Stop reason: SDUPTHER

## 2022-01-05 RX ADMIN — MIDAZOLAM 2 MG: 1 INJECTION INTRAMUSCULAR; INTRAVENOUS at 10:46

## 2022-01-05 RX ADMIN — HYDROCODONE BITARTRATE AND ACETAMINOPHEN 1 TABLET: 5; 325 TABLET ORAL at 12:02

## 2022-01-05 RX ADMIN — PROPOFOL 20 MG: 10 INJECTION, EMULSION INTRAVENOUS at 10:56

## 2022-01-05 RX ADMIN — PROPOFOL 40 MG: 10 INJECTION, EMULSION INTRAVENOUS at 10:47

## 2022-01-05 RX ADMIN — FENTANYL CITRATE 25 MCG: 50 INJECTION, SOLUTION INTRAMUSCULAR; INTRAVENOUS at 10:53

## 2022-01-05 RX ADMIN — SODIUM CHLORIDE: 9 INJECTION, SOLUTION INTRAVENOUS at 10:33

## 2022-01-05 RX ADMIN — FENTANYL CITRATE 25 MCG: 50 INJECTION, SOLUTION INTRAMUSCULAR; INTRAVENOUS at 11:08

## 2022-01-05 RX ADMIN — PROPOFOL 80 MCG/KG/MIN: 10 INJECTION, EMULSION INTRAVENOUS at 10:50

## 2022-01-05 RX ADMIN — PROPOFOL 40 MG: 10 INJECTION, EMULSION INTRAVENOUS at 10:49

## 2022-01-05 RX ADMIN — SODIUM CHLORIDE: 9 INJECTION, SOLUTION INTRAVENOUS at 09:23

## 2022-01-05 RX ADMIN — PROPOFOL 30 MG: 10 INJECTION, EMULSION INTRAVENOUS at 10:52

## 2022-01-05 RX ADMIN — CEFAZOLIN 2000 MG: 1 INJECTION, POWDER, FOR SOLUTION INTRAMUSCULAR; INTRAVENOUS at 10:55

## 2022-01-05 ASSESSMENT — PAIN DESCRIPTION - ORIENTATION: ORIENTATION: LOWER

## 2022-01-05 ASSESSMENT — PULMONARY FUNCTION TESTS
PIF_VALUE: 1
PIF_VALUE: 0
PIF_VALUE: 1
PIF_VALUE: 0
PIF_VALUE: 0
PIF_VALUE: 1
PIF_VALUE: 0
PIF_VALUE: 1
PIF_VALUE: 0

## 2022-01-05 ASSESSMENT — PAIN SCALES - GENERAL
PAINLEVEL_OUTOF10: 6
PAINLEVEL_OUTOF10: 7
PAINLEVEL_OUTOF10: 6
PAINLEVEL_OUTOF10: 4

## 2022-01-05 ASSESSMENT — ENCOUNTER SYMPTOMS: GASTROINTESTINAL NEGATIVE: 1

## 2022-01-05 ASSESSMENT — PAIN DESCRIPTION - LOCATION: LOCATION: BACK

## 2022-01-05 ASSESSMENT — PAIN DESCRIPTION - PAIN TYPE: TYPE: ACUTE PAIN

## 2022-01-05 NOTE — PROGRESS NOTES
Patient admitted to [facility] room 18 with girlfriend at bedside. Bed in low position side rails up call light in reach. Patient denies questions at this time.

## 2022-01-05 NOTE — ANESTHESIA PRE PROCEDURE
Department of Anesthesiology  Preprocedure Note       Name:  Davide Madrid   Age:  39 y.o.  :  1985                                          MRN:  [id number]         Date:  2022      Surgeon: Darshan Luna): Mickie Martines MD    Procedure: Procedure(s):  EXCISION LIPOMA LOWER BACK    Medications prior to admission:   Prior to Admission medications    Medication Sig Start Date End Date Taking? Authorizing Provider   venlafaxine (EFFEXOR) 75 MG tablet Take 75 mg by mouth daily    Historical Provider, MD   hydrOXYzine (VISTARIL) 50 MG capsule Take 50 mg by mouth 2 times daily    Historical Provider, MD   ARIPiprazole (ABILIFY) 5 MG tablet Take 5 mg by mouth daily    Historical Provider, MD   traZODone (DESYREL) 50 MG tablet Take 50 mg by mouth nightly    Historical Provider, MD       Current medications:    Current Facility-Administered Medications   Medication Dose Route Frequency Provider Last Rate Last Admin    0.9 % sodium chloride infusion   IntraVENous Continuous Vilma Ryan LPN        ceFAZolin (ANCEF) 1,000 mg in dextrose 5 % 50 mL IVPB (mini-bag)  1,000 mg IntraVENous 30 Min Pre-Op Vilma Ryan LPN           Allergies: Allergies   Allergen Reactions    Asa [Aspirin] Shortness Of Breath       Problem List:  There is no problem list on file for this patient. Past Medical History:        Diagnosis Date    Hep C w/ coma, chronic        Past Surgical History:        Procedure Laterality Date    ARM SURGERY Left 10/26/2020    EXCISION LEFT LOWER FOREARM LIPOMA performed by Mickie Martines MD at [facility]  2009    SHOULDER CLOSED REDUCTION         Social History:    Social History     Tobacco Use    Smoking status: Current Every Day Smoker     Packs/day: 0.50     Types: Cigarettes    Smokeless tobacco: Current User     Types: Chew   Substance Use Topics    Alcohol use:  No                                Ready to quit: Not Answered  Counseling given: Not Answered      Vital Signs (Current):   Vitals:    01/05/22 0854   BP: 122/71   Pulse: 93   Resp: 16   Temp: 97.4 °F (36.3 °C)   TempSrc: Temporal   SpO2: 93%   Weight: 218 lb (98.9 kg)   Height: 5' 9\" (1.753 m)                                              BP Readings from Last 3 Encounters:   01/05/22 122/71   12/17/21 128/80   12/05/20 (!) 143/78       NPO Status:                                                                                 BMI:   Wt Readings from Last 3 Encounters:   01/05/22 218 lb (98.9 kg)   12/17/21 221 lb 12.8 oz (100.6 kg)   12/05/20 196 lb (88.9 kg)     Body mass index is 32.19 kg/m². CBC:   Lab Results   Component Value Date    WBC 6.4 12/03/2021    RBC 4.95 12/03/2021    RBC 4.61 08/15/2011    HGB 15.2 12/03/2021    HCT 46.1 12/03/2021    MCV 93.1 12/03/2021    RDW 13.2 12/03/2021     12/03/2021       CMP:   Lab Results   Component Value Date     12/03/2021    K 4.3 12/03/2021     12/03/2021    CO2 22 12/03/2021    BUN 8 12/03/2021    CREATININE 1.07 12/03/2021    GFRAA >60 12/03/2021    LABGLOM >60 12/03/2021    LABGLOM >90 10/14/2020    GLUCOSE 92 12/03/2021    GLUCOSE NEGATIVE 08/15/2011    PROT 7.0 12/03/2021    CALCIUM 9.1 12/03/2021    BILITOT 0.26 12/03/2021    ALKPHOS 51 12/03/2021    AST 26 12/03/2021    ALT 22 12/03/2021       POC Tests: No results for input(s): POCGLU, POCNA, POCK, POCCL, POCBUN, POCHEMO, POCHCT in the last 72 hours.     Coags:   Lab Results   Component Value Date    INR 0.89 10/14/2020       HCG (If Applicable): No results found for: PREGTESTUR, PREGSERUM, HCG, HCGQUANT     ABGs: No results found for: PHART, PO2ART, TQR5DRF, NFN0QLG, BEART, P7ICIPQQ     Type & Screen (If Applicable):  No results found for: LABABO, LABRH    Drug/Infectious Status (If Applicable):  Lab Results   Component Value Date    HEPCAB POS 10/14/2020       COVID-19 Screening (If Applicable):   Lab Results   Component Value Date    COVID19 NOT DETECTED 12/05/2020 Anesthesia Evaluation  Patient summary reviewed and Nursing notes reviewed no history of anesthetic complications:   Airway: Mallampati: II        Dental:          Pulmonary: breath sounds clear to auscultation                             Cardiovascular:  Exercise tolerance: good (>4 METS),         ECG reviewed  Rhythm: regular  Rate: normal                    Neuro/Psych:               GI/Hepatic/Renal:   (+) GERD: poorly controlled, hepatitis:, liver disease:,           Endo/Other:                     Abdominal:       Abdomen: soft. Vascular: Other Findings:             Anesthesia Plan      MAC     ASA 2       Induction: intravenous. MIPS: Postoperative opioids intended and Prophylactic antiemetics administered. Anesthetic plan and risks discussed with patient. Plan discussed with CRNA.                   [address] [place] [address],    1/5/2022

## 2022-01-05 NOTE — H&P
Update History & Physical    The patient's History and Physical of December 17, 2021 was reviewed with the patient and I examined the patient. There was no change. The surgical site was confirmed by the patient and me. Plan: The risks, benefits, expected outcome, and alternative to the recommended procedure have been discussed with the patient. Patient understands and wants to proceed with the procedure. Electronically signed by Karen Carrillo MD on 1/5/2022 at 10:18 AM    Misti Baker MD  [facility] Surgery [facility] H&P    Pt Name: Lakhwinder Mireles  MRN: [id number]  YOB: 1985  Date of evaluation: 1/5/2022  Primary Care Physician: Joaquina Christy MD  Patient evaluated at the request of self  Reason for evaluation: lipoma  IMPRESSIONS:       ICD-10-CM    1. Lipoma of torso  D17.1      does not have any pertinent problems on file. PLANS:   1. Patient with symptomatic back lipoma is uncomfortable when he leans against it. It has gotten slightly enlarged in size. Patient is not adequate candidate for excision. We will perform this at his convenience either in the office versus in the OR. SUBJECTIVE:   CC: Probe    History of Chief Complaint:    Baron Paul is a 39 y.o.male who has a history of lipomas have excised myself. He comes in with one on his back gotten larger is causing him discomfort. It is mild to moderate in severity. Past Medical History  Past Medical History:   Diagnosis Date    Hep C w/ coma, chronic        Past Surgical History  Past Surgical History:   Procedure Laterality Date    ARM SURGERY Left 10/26/2020    EXCISION LEFT LOWER FOREARM LIPOMA performed by Karen [facility]illo MD at [facility]  2009    SHOULDER CLOSED REDUCTION         Medications  No current facility-administered medications on file prior to encounter.      Current Outpatient Medications on File Prior to Encounter   Medication Sig Dispense Refill    venlafaxine (EFFEXOR) 75 MG tablet Take 75 mg by mouth daily      hydrOXYzine (VISTARIL) 50 MG capsule Take 50 mg by mouth 2 times daily      ARIPiprazole (ABILIFY) 5 MG tablet Take 5 mg by mouth daily      traZODone (DESYREL) 50 MG tablet Take 50 mg by mouth nightly         Allergies  Asa [aspirin]    Family History      Problem Relation Age of Onset    No Known Problems Mother     Alcohol Abuse Father     Liver Disease Father         Social History  Social History     Socioeconomic History    Marital status: Single     Spouse name: Not on file    Number of children: Not on file    Years of education: Not on file    Highest education level: Not on file   Occupational History    Not on file   Tobacco Use    Smoking status: Current Every Day Smoker     Packs/day: 0.50     Types: Cigarettes    Smokeless tobacco: Current User     Types: Chew   Vaping Use    Vaping Use: Never used   Substance and Sexual Activity    Alcohol use: No    Drug use: No    Sexual activity: Yes     Partners: Female   Other Topics Concern    Not on file   Social History Narrative    Not on file     Social Determinants of Health     Financial Resource Strain:     Difficulty of Paying Living Expenses: Not on file   Food Insecurity:     Worried About Running Out of Food in the Last Year: Not on file    Krupa of Food in the Last Year: Not on file   Transportation Needs:     Lack of Transportation (Medical): Not on file    Lack of Transportation (Non-Medical):  Not on file   Physical Activity:     Days of Exercise per Week: Not on file    Minutes of Exercise per Session: Not on file   Stress:     Feeling of Stress : Not on file   Social Connections:     Frequency of Communication with Friends and Family: Not on file    Frequency of Social Gatherings with Friends and Family: Not on file    Attends [employer] Services: Not on file    Active Member of Clubs or Organizations: Not on file    Attends Club or Organization Meetings: Not on file   Conchis Manual Marital Status: Not on file   Intimate Partner Violence:     Fear of Current or Ex-Partner: Not on file    Emotionally Abused: Not on file    Physically Abused: Not on file    Sexually Abused: Not on file   Housing Stability:     Unable to Pay for Housing in the Last Year: Not on file    Number of [place] in the Last Year: Not on file    Unstable Housing in the Last Year: Not on file        Review of Systems:  Review of Systems   Constitutional: Negative. HENT: Negative. Cardiovascular: Negative. Gastrointestinal: Negative. Genitourinary: Negative. Skin:        Painful mass on his back   Hematological: Negative. OBJECTIVE:   CURRENT VITALS:  height is 5' 9\" (1.753 m) and weight is 218 lb (98.9 kg). His temporal temperature is 97.4 °F (36.3 °C). His blood pressure is 122/71 and his pulse is 93. His respiration is 16 and oxygen saturation is 93%. Body mass index is 32.19 kg/m². Physical Exam  Constitutional:       Appearance: Normal appearance. He is not ill-appearing. Eyes:      Extraocular Movements: Extraocular movements intact. Pupils: Pupils are equal, round, and reactive to light. Cardiovascular:      Rate and Rhythm: Normal rate. Pulses: Normal pulses. Heart sounds: No murmur heard. Pulmonary:      Effort: Pulmonary effort is normal. No respiratory distress. Abdominal:      General: There is no distension. Palpations: Abdomen is soft. Skin:     General: Skin is warm and dry. Comments: Lower left back there is a rubbery mass consistent with lipoma   Neurological:      General: No focal deficit present. Mental Status: He is alert and oriented to person, place, and time.    Psychiatric:         Mood and Affect: Mood normal.         Behavior: Behavior normal.         LABS:   No results for input(s): WBC, HGB, HCT, PLT, NA, K, CL, CO2, BUN, CREATININE, MG, PHOS, CALCIUM, PTT, INR, AST, ALT, BILITOT, BILIDIR, AMYLASE, LIPASE, LDH, LACTA, George Lin, BACTERIA in the last 72 hours. Invalid input(s): PT, WBCU, RBCU, LEUKOCYTESUA  RADIOLOGY:   I have personally reviewed the following films:  No orders to display       Thank you for the interesting evaluation. Further recommendations to follow.     Electronically signed by Ricky Pichardo MD on 1/5/2022 at 10:18 AM

## 2022-01-05 NOTE — ANESTHESIA POSTPROCEDURE EVALUATION
Department of Anesthesiology  Postprocedure Note    Patient: Antonella Boucher  MRN: [id number]  YOB: 1985  Date of evaluation: 1/5/2022  Time:  1:42 PM     Procedure Summary     Date: 01/05/22 Room / Location: [address] [facility] OR    Anesthesia Start: 1664 Anesthesia Stop: 9461    Procedure: EXCISION LIPOMA LOWER BACK (N/A ) Diagnosis: (OJFCUM)    Surgeons: Selena Blunt MD Responsible Provider: Ceci Shi DO    Anesthesia Type: MAC ASA Status: 2          Anesthesia Type: MAC    Betsy Phase I: Betsy Score: 10    Betsy Phase II: Betsy Score: 10    Last vitals: Reviewed and per EMR flowsheets.        Anesthesia Post Evaluation    Patient location during evaluation: bedside  Patient participation: complete - patient participated  Level of consciousness: awake  Airway patency: patent  Nausea & Vomiting: no nausea  Complications: no  Cardiovascular status: hemodynamically stable  Respiratory status: acceptable  Hydration status: stable

## 2022-01-06 ENCOUNTER — TELEPHONE (OUTPATIENT)
Dept: SURGERY | Age: 37
End: 2022-01-06

## 2022-01-06 NOTE — TELEPHONE ENCOUNTER
Pt states that he is doing well this morning. States that he is up and moving around; and urinating with no issues. Pt denies any excessive bleeding or drainage. He denies n/v, fevers or chills. Pt advised to call the office with any questions or concerns. Follow up appt 1/21/22.

## 2022-01-06 NOTE — OP NOTE
Operative Note      Patient: Dima Grijalva  YOB: 1985  MRN: [id number]    Date of Procedure: 1/5/2022    Pre-Op Diagnosis: LIPOMA    Post-Op Diagnosis: same       Procedure(s):  EXCISION LIPOMA LOWER BACK    Surgeon(s): Guy Willingham MD    Assistant:   * No surgical staff found *    Anesthesia: Monitor Anesthesia Care    Estimated Blood Loss (mL): Minimal    Complications: None    Specimens:   ID Type Source Tests Collected by Time Destination   A : right lower back lipoma Tissue Back SURGICAL PATHOLOGY Guy Willingham MD 1/5/2022 1117        Implants:  * No implants in log *      Drains: * No LDAs found *    Findings:  multilobulated lipomatous mass     Detailed Description of Procedure:   He was in the preoperative holding room where informed sent was reviewed taken the operating placed operating table in a supine position after adequate anesthesia he was bumped. The lipoma was palpable on his right flank. Local anesthetic was instilled the skin and subcutaneous tissue. A transverse incision was made measuring 6 cm. This was deepened with cautery. A well demarcated fatty multilobulated tumor was identified was consistent with a lipoma. The tumor was dissected from the surrounding tissue with cautery. All lobulations were removed. It was excised from its base. The mass was within the skin and subcutaneous tissue was above the level of the fascia. Specimen was passed off the table. The wound was irrigated. It was hemostatic. It was closed in layers with deep dermal interacted Vicryl and skin was closed with 4-0 Vicryl in a running subcuticular fashion and surgical glue applied.   Switch from a seizure and transfer the PACU in stable condition all sponge and needle counts were correct    Electronically signed by Guy Willingham MD on 1/6/2022 at 4:08 PM

## 2022-01-21 ENCOUNTER — OFFICE VISIT (OUTPATIENT)
Dept: SURGERY | Age: 37
End: 2022-01-21

## 2022-01-21 VITALS
HEART RATE: 76 BPM | WEIGHT: 217.8 LBS | BODY MASS INDEX: 32.26 KG/M2 | DIASTOLIC BLOOD PRESSURE: 62 MMHG | TEMPERATURE: 96.9 F | SYSTOLIC BLOOD PRESSURE: 118 MMHG | HEIGHT: 69 IN | OXYGEN SATURATION: 95 % | RESPIRATION RATE: 18 BRPM

## 2022-01-21 DIAGNOSIS — Z09 POSTOPERATIVE EXAMINATION: Primary | ICD-10-CM

## 2022-01-21 PROCEDURE — 99024 POSTOP FOLLOW-UP VISIT: CPT | Performed by: SURGERY

## 2022-01-21 NOTE — PROGRESS NOTES
Simi Daniel MD  [facility] Post op Note    Pt Name: Micheline Henry  Medical Record Number: [id number]  Date of Birth 1985   Today's Date: 1/21/2022    ASSESSMENT       ICD-10-CM    1. Postoperative examination  Z09         PLAN   1. Patient overall making a good recovery, the tingling he was feeling is unlikely any nerve damage as the lipoma was superficial to the fascia and were not any nerves. I discussed with him likely positional.  He said it is improving. He will just call me if it does not improve or worsens in the future. Lukasz Garcia is doing overall well. He states that after surgery he was having some tingling in his right leg. It is getting better. No fevers or chills. .       CURRENT MEDICATIONS     Current Outpatient Medications on File Prior to Visit   Medication Sig Dispense Refill    venlafaxine (EFFEXOR) 75 MG tablet Take 75 mg by mouth daily      hydrOXYzine (VISTARIL) 50 MG capsule Take 50 mg by mouth 2 times daily      ARIPiprazole (ABILIFY) 5 MG tablet Take 5 mg by mouth daily      traZODone (DESYREL) 50 MG tablet Take 50 mg by mouth nightly       No current facility-administered medications on file prior to visit. OBJECTIVE   CURRENT VITALS:  height is 5' 9\" (1.753 m) and weight is 217 lb 12.8 oz (98.8 kg). His temporal temperature is 96.9 °F (36.1 °C). His blood pressure is 118/62 and his pulse is 76. His respiration is 18 and oxygen saturation is 95%. Alert oriented appropriate no distress  His incision is clean dry and intact  No erythema or induration    LABS and Pathology   FINAL DIAGNOSIS:   Soft tissue, right lower back, lipoma, excision:             Mature adipose tissue consistent with lipoma.      RADIOLOGY   na    Electronically signed by Simi Daniel MD on 1/21/2022 at 9:46 AM

## 2022-03-15 ENCOUNTER — HOSPITAL ENCOUNTER (EMERGENCY)
Age: 37
Discharge: HOME | End: 2022-03-15
Payer: COMMERCIAL

## 2022-03-15 ENCOUNTER — APPOINTMENT (OUTPATIENT)
Dept: GENERAL RADIOLOGY | Age: 37
End: 2022-03-15
Payer: COMMERCIAL

## 2022-03-15 VITALS
DIASTOLIC BLOOD PRESSURE: 79 MMHG | RESPIRATION RATE: 18 BRPM | HEART RATE: 64 BPM | SYSTOLIC BLOOD PRESSURE: 150 MMHG | OXYGEN SATURATION: 97 %

## 2022-03-15 DIAGNOSIS — S70.01XA CONTUSION OF RIGHT HIP AND THIGH, INITIAL ENCOUNTER: Primary | ICD-10-CM

## 2022-03-15 DIAGNOSIS — S70.11XA CONTUSION OF RIGHT HIP AND THIGH, INITIAL ENCOUNTER: Primary | ICD-10-CM

## 2022-03-15 PROCEDURE — 6370000000 HC RX 637 (ALT 250 FOR IP): Performed by: PHYSICIAN ASSISTANT

## 2022-03-15 PROCEDURE — 73552 X-RAY EXAM OF FEMUR 2/>: CPT

## 2022-03-15 PROCEDURE — 99282 EMERGENCY DEPT VISIT SF MDM: CPT

## 2022-03-15 RX ORDER — ACETAMINOPHEN 325 MG/1
650 TABLET ORAL ONCE
Status: COMPLETED | OUTPATIENT
Start: 2022-03-15 | End: 2022-03-15

## 2022-03-15 RX ADMIN — ACETAMINOPHEN 650 MG: 325 TABLET ORAL at 13:44

## 2022-03-15 ASSESSMENT — ENCOUNTER SYMPTOMS
VOMITING: 0
NAUSEA: 0
BACK PAIN: 0
DIARRHEA: 0
COUGH: 0
SORE THROAT: 0
COLOR CHANGE: 1

## 2022-03-15 ASSESSMENT — PAIN SCALES - GENERAL
PAINLEVEL_OUTOF10: 7
PAINLEVEL_OUTOF10: 7

## 2022-03-15 ASSESSMENT — PAIN DESCRIPTION - LOCATION: LOCATION: LEG

## 2022-03-15 ASSESSMENT — PAIN DESCRIPTION - PAIN TYPE: TYPE: ACUTE PAIN

## 2022-03-15 ASSESSMENT — PAIN DESCRIPTION - ORIENTATION: ORIENTATION: RIGHT;POSTERIOR

## 2022-03-15 NOTE — Clinical Note
Luann Mackenzie was seen and treated in our emergency department on 3/15/2022. He may return to work on 03/17/2022. Patient seen and evaluated in the ED today. Recommend off work today and tomorrow for recovery and then may return light duty full time with restrictions of no lifting, pushing, pulling greater than 10 pounds. No squatting or climbing stairs or ladders for one week until follow up with PCP. If you have any questions or concerns, please don't hesitate to call.       Taye Esparza PA-C

## 2022-03-15 NOTE — ED NOTES
Pt in through ED. This morning at work he was standing on a 48in riser and slipped off. ON the way down he caught the posterior upper part of his right leg on a ring. Since then he has had swelling and pain. The area has bruising.       Finn Alvarez RN  03/15/22 1293

## 2022-03-15 NOTE — ED NOTES
Pt does not need any  BAT or drug urine testing, no company requirement. RN informed!      Xena Huynh, EMT-P  03/15/22 1320

## 2022-03-15 NOTE — ED PROVIDER NOTES
[address] [facility] [address] [address] EMERGENCY DEPT    EMERGENCY MEDICINE     Pt Name: Uzair Mesa  MRN: [id number]  [place] 1985  Date of evaluation: 3/15/2022  Provider: Sierra Gonzales PA-C    CHIEF COMPLAINT     No chief complaint on file. HISTORY OF PRESENT ILLNESS    Uzair Mesa is a pleasant 39 y.o. male who presents to the emergency department for right leg pain. Patient states he was at work today at [employer] in which he was up about 4 to 5 feet high in which he tripped and fell landing hard on the right posterior thigh. He noticed worsened bruising to the right leg along with pain and so came to the ED for evaluation. He states it is uncomfortable to walk but is able to ambulate. No complaints of numbness and tingling to the right lower extremity. Has noticed bruising to the right posterior thigh. He states the pain is around a 4/10 dull ache that is worse with ambulation. No other concerns or complaints at this time. Has had no prior injury or surgery to the right leg. Triage notes and Nursing notes were reviewed by myself. Any discrepancies are addressed above.     PAST MEDICAL HISTORY     Past Medical History:   Diagnosis Date    Hep C w/ coma, chronic        SURGICAL HISTORY       Past Surgical History:   Procedure Laterality Date    ARM SURGERY Left 10/26/2020    EXCISION LEFT LOWER FOREARM LIPOMA performed by Manjinder Mays MD at [address] [place] [address] N/A 1/5/2022    EXCISION LIPOMA LOWER BACK performed by Manjinder Mays MD at [address]       Discharge Medication List as of 3/15/2022  2:30 PM      CONTINUE these medications which have NOT CHANGED    Details   venlafaxine (EFFEXOR) 75 MG tablet Take 75 mg by mouth dailyHistorical Med      hydrOXYzine (VISTARIL) 50 MG capsule Take 50 mg by mouth 2 times dailyHistorical Med      ARIPiprazole (ABILIFY) 5 MG tablet Take 5 mg by on file    Emotionally Abused: Not on file    Physically Abused: Not on file    Sexually Abused: Not on file   Housing Stability:     Unable to Pay for Housing in the Last Year: Not on file    Number of Places Lived in the Last Year: Not on file    Unstable Housing in the Last Year: Not on file       REVIEW OF SYSTEMS     Review of Systems   Constitutional: Negative for chills and fever. HENT: Negative for congestion, ear pain and sore throat. Respiratory: Negative for cough. Cardiovascular: Negative for chest pain and palpitations. Gastrointestinal: Negative for diarrhea, nausea and vomiting. Genitourinary: Negative for dysuria and urgency. Musculoskeletal: Positive for myalgias. Negative for arthralgias, back pain, gait problem, joint swelling and neck pain. Skin: Positive for color change. Negative for pallor, rash and wound. All other systems reviewed and are negative. Except as noted above the remainder of the review of systems was reviewed and is. PHYSICAL EXAM     INITIAL VITALS:  blood pressure is 150/79 (abnormal) and his pulse is 64. His respiration is 18 and oxygen saturation is 97%. Physical Exam  Vitals and nursing note reviewed. Exam conducted with a chaperone present. Constitutional:       General: He is not in acute distress. Appearance: Normal appearance. He is normal weight. He is not ill-appearing, toxic-appearing or diaphoretic. HENT:      Head: Normocephalic and atraumatic. Right Ear: External ear normal.      Left Ear: External ear normal.      Nose: Nose normal.      Mouth/Throat:      Mouth: Mucous membranes are moist.   Eyes:      Extraocular Movements: Extraocular movements intact. Pupils: Pupils are equal, round, and reactive to light. Cardiovascular:      Rate and Rhythm: Normal rate and regular rhythm. Pulses: Normal pulses. Heart sounds: Normal heart sounds. No murmur heard.       Pulmonary:      Effort: Pulmonary effort is normal. No respiratory distress. Breath sounds: Normal breath sounds. Abdominal:      General: Bowel sounds are normal. There is no distension. Palpations: Abdomen is soft. Tenderness: There is no abdominal tenderness. Musculoskeletal:         General: Normal range of motion. Cervical back: Normal range of motion and neck supple. Legs:       Comments: Right lower extremity demonstrates minor superficial abrasion approximately 2 x 1 cm posterior upper leg with no active bleeding. Surrounding that is noticed to be ecchymosis and swelling approximately 8 x 8 cm. Patient has full active range of motion of hip, knee, ankle and foot. 2+ palpable dorsalis base and posterior tibial pulse. Sensation intact distally light touch throughout the foot. 5/5 strength EHL, dorsiflexion, plantarflexion, knee flexion and extension. Skin:     General: Skin is warm and dry. Capillary Refill: Capillary refill takes less than 2 seconds. Neurological:      Mental Status: He is alert and oriented to person, place, and time. GCS: GCS eye subscore is 4. GCS verbal subscore is 5. GCS motor subscore is 6. Psychiatric:         Mood and Affect: Mood normal.         Behavior: Behavior normal.         Thought Content: Thought content normal.         DIFFERENTIAL DIAGNOSIS:   Differential diagnoses are discussed    DIAGNOSTIC RESULTS     EKG: All EKG's are interpreted by the Emergency Department Physician who either signs or Co-signsthis chart in the absence of a cardiologist.    None      RADIOLOGY: non-plain film images(s) such as CT, Ultrasound and MRI are read by the radiologist.    XR FEMUR RIGHT (MIN 2 VIEWS)   Final Result   Normal femur. **This report has been created using voice recognition software. It may contain minor errors which are inherent in voice recognition technology. **      Final report electronically signed by Dr. Agustin Rosenberg on 3/15/2022 1:52 PM LABS:   Labs Reviewed - No data to display    EMERGENCY DEPARTMENT COURSE:   Vitals:    Vitals:    03/15/22 1249   BP: (!) 150/79   Pulse: 64   Resp: 18   SpO2: 97%     3:27 PM EDT: The patient was seen and evaluated. MDM:  Patient is 70-year-old male who came to the ED to be evaluated for right leg pain. Appropriate testing/imaging of right femur x-rays was done based on the patient's initial complaints, history, and physical exam.   Results of right femur x-rays demonstrated no emergent findings. Pertinent results were none. Discussed x-ray findings and physical exam findings with patient. Discussed at this point believe that he has more of a contusion and probable hematoma to the region. Would recommend ice, elevation, rest, compression to the area of pain. Recommend Tylenol/Motrin for pain control. If patient has noticeable paresthesias, coldness to the extremity, progressive worsening pain recommend follow-up to the ED for evaluation. Patient was seen independently by myself. The patient's final impression and disposition and plan was determined by myself. Strict return precautions and follow up instructions were discussed with the patient prior to discharge, with which the patient agrees. Physical assessment findings, diagnostic testing(s) if applicable, and vital signs reviewed with patient/patient representative. Questions answered. Medications as directed, including OTC medications for supportive care. Education provided on medications. Differential diagnosis(s) discussed with patient/patient representative. Home care/self care instructions reviewed with patient/patient representative. Patient is to follow-up with family care provider in 7 days if no improvement. Patient is to go to the emergency department if symptoms worsen. Patient/patient representative is aware of care plan, questions answered, verbalizes understanding and is in agreement.       CRITICAL CARE: None    CONSULTS:  None    PROCEDURES:  None    FINAL IMPRESSION      1.  Contusion of right hip and thigh, initial encounter          DISPOSITION/PLAN   Discharge home    PATIENT REFERRED TO:  MD Matthew Cuevas [place] [address]  [facility]    In 1 week  For re-evaluation    [address] [place] [address] [address] [address] EMERGENCY DEPT  [address] [place] [address]  [phone]  In 2 days  If symptoms worsen      DISCHARGEMEDICATIONS:  Discharge Medication List as of 3/15/2022  2:30 PM              (Please note that portions of this note were completed with a voice recognition program.  Efforts were made to edit the dictations but occasionallywords are mis-transcribed.)      Edie Reynoso PA-C(electronically signed)  Physician Associate, Emergency Department       Edie Reynoso PA-C  03/15/22 1534

## 2022-03-27 ENCOUNTER — HOSPITAL ENCOUNTER (INPATIENT)
Age: 37
LOS: 5 days | Discharge: HOME | DRG: 751 | End: 2022-04-01
Attending: EMERGENCY MEDICINE | Admitting: PSYCHIATRY & NEUROLOGY
Payer: COMMERCIAL

## 2022-03-27 ENCOUNTER — APPOINTMENT (OUTPATIENT)
Dept: GENERAL RADIOLOGY | Age: 37
DRG: 751 | End: 2022-03-27
Payer: COMMERCIAL

## 2022-03-27 DIAGNOSIS — F29 PSYCHOSIS, UNSPECIFIED PSYCHOSIS TYPE (HCC): Primary | ICD-10-CM

## 2022-03-27 PROBLEM — F23 ACUTE PSYCHOSIS (HCC): Status: ACTIVE | Noted: 2022-03-27

## 2022-03-27 LAB
ALBUMIN SERPL-MCNC: 4.5 G/DL (ref 3.5–5.1)
ALP BLD-CCNC: 61 U/L (ref 38–126)
ALT SERPL-CCNC: 16 U/L (ref 11–66)
ANION GAP SERPL CALCULATED.3IONS-SCNC: 14 MEQ/L (ref 8–16)
AST SERPL-CCNC: 26 U/L (ref 5–40)
BASOPHILS # BLD: 0.3 %
BASOPHILS ABSOLUTE: 0 THOU/MM3 (ref 0–0.1)
BILIRUB SERPL-MCNC: 0.5 MG/DL (ref 0.3–1.2)
BUN BLDV-MCNC: 12 MG/DL (ref 7–22)
CALCIUM SERPL-MCNC: 9.6 MG/DL (ref 8.5–10.5)
CHLORIDE BLD-SCNC: 102 MEQ/L (ref 98–111)
CO2: 22 MEQ/L (ref 23–33)
CREAT SERPL-MCNC: 1 MG/DL (ref 0.4–1.2)
EOSINOPHIL # BLD: 0.9 %
EOSINOPHILS ABSOLUTE: 0.1 THOU/MM3 (ref 0–0.4)
ERYTHROCYTE [DISTWIDTH] IN BLOOD BY AUTOMATED COUNT: 14.5 % (ref 11.5–14.5)
ERYTHROCYTE [DISTWIDTH] IN BLOOD BY AUTOMATED COUNT: 48.4 FL (ref 35–45)
ETHYL ALCOHOL, SERUM: < 0.01 %
GFR SERPL CREATININE-BSD FRML MDRD: 84 ML/MIN/1.73M2
GLUCOSE BLD-MCNC: 111 MG/DL (ref 70–108)
HCT VFR BLD CALC: 47.4 % (ref 42–52)
HEMOGLOBIN: 15.8 GM/DL (ref 14–18)
IMMATURE GRANS (ABS): 0.02 THOU/MM3 (ref 0–0.07)
IMMATURE GRANULOCYTES: 0.2 %
LYMPHOCYTES # BLD: 20 %
LYMPHOCYTES ABSOLUTE: 2.3 THOU/MM3 (ref 1–4.8)
MCH RBC QN AUTO: 30.3 PG (ref 26–33)
MCHC RBC AUTO-ENTMCNC: 33.3 GM/DL (ref 32.2–35.5)
MCV RBC AUTO: 91 FL (ref 80–94)
MONOCYTES # BLD: 7.8 %
MONOCYTES ABSOLUTE: 0.9 THOU/MM3 (ref 0.4–1.3)
NUCLEATED RED BLOOD CELLS: 0 /100 WBC
OSMOLALITY CALCULATION: 276.1 MOSMOL/KG (ref 275–300)
PLATELET # BLD: 346 THOU/MM3 (ref 130–400)
PMV BLD AUTO: 9.4 FL (ref 9.4–12.4)
POTASSIUM REFLEX MAGNESIUM: 3.8 MEQ/L (ref 3.5–5.2)
PRO-BNP: 99.6 PG/ML (ref 0–450)
RBC # BLD: 5.21 MILL/MM3 (ref 4.7–6.1)
SARS-COV-2, NAAT: NOT  DETECTED
SEG NEUTROPHILS: 70.8 %
SEGMENTED NEUTROPHILS ABSOLUTE COUNT: 8.1 THOU/MM3 (ref 1.8–7.7)
SODIUM BLD-SCNC: 138 MEQ/L (ref 135–145)
TOTAL PROTEIN: 7.6 G/DL (ref 6.1–8)
TROPONIN T: < 0.01 NG/ML
WBC # BLD: 11.4 THOU/MM3 (ref 4.8–10.8)

## 2022-03-27 PROCEDURE — 99284 EMERGENCY DEPT VISIT MOD MDM: CPT

## 2022-03-27 PROCEDURE — 6360000002 HC RX W HCPCS: Performed by: EMERGENCY MEDICINE

## 2022-03-27 PROCEDURE — 71045 X-RAY EXAM CHEST 1 VIEW: CPT

## 2022-03-27 PROCEDURE — 85025 COMPLETE CBC W/AUTO DIFF WBC: CPT

## 2022-03-27 PROCEDURE — 6370000000 HC RX 637 (ALT 250 FOR IP): Performed by: PSYCHIATRY & NEUROLOGY

## 2022-03-27 PROCEDURE — 82077 ASSAY SPEC XCP UR&BREATH IA: CPT

## 2022-03-27 PROCEDURE — 1240000000 HC EMOTIONAL WELLNESS R&B

## 2022-03-27 PROCEDURE — 96372 THER/PROPH/DIAG INJ SC/IM: CPT

## 2022-03-27 PROCEDURE — 93005 ELECTROCARDIOGRAM TRACING: CPT | Performed by: EMERGENCY MEDICINE

## 2022-03-27 PROCEDURE — 87635 SARS-COV-2 COVID-19 AMP PRB: CPT

## 2022-03-27 PROCEDURE — 84484 ASSAY OF TROPONIN QUANT: CPT

## 2022-03-27 PROCEDURE — 80053 COMPREHEN METABOLIC PANEL: CPT

## 2022-03-27 PROCEDURE — 83880 ASSAY OF NATRIURETIC PEPTIDE: CPT

## 2022-03-27 PROCEDURE — 36415 COLL VENOUS BLD VENIPUNCTURE: CPT

## 2022-03-27 RX ORDER — VENLAFAXINE HYDROCHLORIDE 75 MG/1
75 CAPSULE, EXTENDED RELEASE ORAL DAILY
Status: ON HOLD | COMMUNITY
Start: 2022-01-16 | End: 2022-04-01 | Stop reason: HOSPADM

## 2022-03-27 RX ORDER — MAGNESIUM HYDROXIDE/ALUMINUM HYDROXICE/SIMETHICONE 120; 1200; 1200 MG/30ML; MG/30ML; MG/30ML
30 SUSPENSION ORAL EVERY 6 HOURS PRN
Status: DISCONTINUED | OUTPATIENT
Start: 2022-03-27 | End: 2022-04-01 | Stop reason: HOSPADM

## 2022-03-27 RX ORDER — HALOPERIDOL 5 MG/ML
5 INJECTION INTRAMUSCULAR ONCE
Status: COMPLETED | OUTPATIENT
Start: 2022-03-27 | End: 2022-03-27

## 2022-03-27 RX ORDER — HYDROXYZINE HYDROCHLORIDE 25 MG/1
50 TABLET, FILM COATED ORAL 3 TIMES DAILY PRN
Status: DISCONTINUED | OUTPATIENT
Start: 2022-03-27 | End: 2022-04-01 | Stop reason: HOSPADM

## 2022-03-27 RX ORDER — LORAZEPAM 2 MG/ML
2 INJECTION INTRAMUSCULAR ONCE
Status: COMPLETED | OUTPATIENT
Start: 2022-03-27 | End: 2022-03-27

## 2022-03-27 RX ORDER — OMEPRAZOLE 20 MG/1
20 CAPSULE, DELAYED RELEASE ORAL DAILY
Status: ON HOLD | COMMUNITY
Start: 2022-02-27 | End: 2022-04-01 | Stop reason: HOSPADM

## 2022-03-27 RX ORDER — QUETIAPINE FUMARATE 25 MG/1
50 TABLET, FILM COATED ORAL NIGHTLY
Status: DISCONTINUED | OUTPATIENT
Start: 2022-03-27 | End: 2022-03-28

## 2022-03-27 RX ORDER — TRAZODONE HYDROCHLORIDE 50 MG/1
50 TABLET ORAL NIGHTLY PRN
Status: DISCONTINUED | OUTPATIENT
Start: 2022-03-27 | End: 2022-04-01 | Stop reason: HOSPADM

## 2022-03-27 RX ORDER — ACETAMINOPHEN 325 MG/1
650 TABLET ORAL EVERY 4 HOURS PRN
Status: DISCONTINUED | OUTPATIENT
Start: 2022-03-27 | End: 2022-04-01 | Stop reason: HOSPADM

## 2022-03-27 RX ADMIN — LORAZEPAM 2 MG: 2 INJECTION INTRAMUSCULAR; INTRAVENOUS at 17:58

## 2022-03-27 RX ADMIN — QUETIAPINE FUMARATE 50 MG: 25 TABLET ORAL at 23:01

## 2022-03-27 RX ADMIN — HALOPERIDOL LACTATE 5 MG: 5 INJECTION INTRAMUSCULAR at 17:58

## 2022-03-27 ASSESSMENT — SLEEP AND FATIGUE QUESTIONNAIRES
DO YOU USE A SLEEP AID: YES
DIFFICULTY ARISING: NO
DO YOU HAVE DIFFICULTY SLEEPING: YES
SLEEP PATTERN: DIFFICULTY FALLING ASLEEP
DIFFICULTY FALLING ASLEEP: YES
DIFFICULTY STAYING ASLEEP: NO
AVERAGE NUMBER OF SLEEP HOURS: 7
RESTFUL SLEEP: NO

## 2022-03-27 ASSESSMENT — PAIN DESCRIPTION - FREQUENCY: FREQUENCY: CONTINUOUS

## 2022-03-27 ASSESSMENT — LIFESTYLE VARIABLES
HISTORY_ALCOHOL_USE: NO
HISTORY_ALCOHOL_USE: NO

## 2022-03-27 ASSESSMENT — PAIN SCALES - GENERAL
PAINLEVEL_OUTOF10: 0
PAINLEVEL_OUTOF10: 8

## 2022-03-27 NOTE — ED NOTES
This nurse at computer charting. Pt got out of bed with monitors on and stood in doorway. Officer stopped patient at door way.  This nurse exited room     Devonte CristobalSCI-Waymart [facility]  03/27/22 8903

## 2022-03-27 NOTE — ED NOTES
Patient resting on cart calmly at this time. Continuous video monitoring continues.      Alesha Tiwari RN  03/27/22 3943

## 2022-03-27 NOTE — PROGRESS NOTES
1712 Level B paged to clinician. Clinician spoke to patient at length. Unable to assess a variety of things but did obtain some information regarding history. Patient acting suspiciously and making compulsive and erratic movements. Clinician uncomfortable and alerted staff. Patient often does not answer questions and will suddenly speak. Patient requesting to be medicated. 1745 Patient moved to the safe rooms. Medicated by nursing staff. No other requests at this time. 3761 Patient is laying down in safe rooms at this time after requesting for his door to be locked. 1851 Patient asleep on cot in safe room. Clinician called nursing staff and informed that patient would need COVID-19 swab. 2118 Clinician called psychiatry for disposition. Patient to be admitted to 4E. Clinician informed medical provider of disposition. 1917 Report given Esther De Los Santos on 4E.

## 2022-03-27 NOTE — ED TRIAGE NOTES
Presents to Er with concern of CP x2 days. Pt reports pain 8/0 and constant. Pt appears to be agitated and paranoid. Pt at times not cooperative. Pt staring at the  outside of room.  This nurse to monitor

## 2022-03-27 NOTE — ED NOTES
ED nurse-to-nurse bedside report    Chief Complaint   Patient presents with    Chest Pain      LOC: alert and orientated to name and place  Vital signs   Vitals:    03/27/22 1701   BP: (!) 153/85   Pulse: 84   Resp: 20   Temp: 98.3 °F (36.8 °C)   SpO2: 98%   Weight: 200 lb (90.7 kg)   Height: 5' 9\" (1.753 m)      Pain:  N/A  Pain Interventions: N/A  Pain Goal: N/A  Oxygen: No    Current needs required Room Air   Telemetry: No  LDAs:   Peripheral IV 01/05/22 Left Wrist (Active)     Continuous Infusions:   Mobility: Independent  Ralph Fall Risk Score: No flowsheet data found.   Fall Interventions: Hourly Rounding, Continuous Monitoring  Report given to: Oscar Leong RN  03/27/22 0851

## 2022-03-27 NOTE — ED NOTES
Pt resting in bed at this time with eyes closed. No concerns voiced, call light in reach. Pt remains in [facility] safe rooms.       Frandy Lai RN  03/27/22 1925

## 2022-03-27 NOTE — ED PROVIDER NOTES
[facility] COMPLAINT       Chief Complaint   Patient presents with    Chest Pain       Nurses Notes reviewed and I agree except as noted in the HPI. HISTORY OF PRESENT ILLNESS    Clifton Jensen is a 39 y.o. male. Patient is being seen for erratic behavior. Patient was found wandering around in the hallway and when the police approached him he decided to check in to be seen for chest pain. He does not appear to be having any chest pain. He refused to get into the bed and he was standing by the door and holding onto the doorknob in room 19. He appears to be very paranoid and is looking around the halls, rather than answering me when I am trying to take the history. He appears to be ambulating comfortably however. No visible dyspnea    Reportedly he took his sister's car to drive here and she spoke to the police and she told him that he does not have permission to use the car. The patient states that he believes people are chasing him. He seems very intense. At one point he kept trying to go into the hallway and had to be redirected back in. He seems to be behaving in an unpredictable fashion. We asked the counselor to talk to him and he did make some statements that could be interpreted as possibly suicidal so we are putting him on a  hold at this point    REVIEW OF SYSTEMS         Review of systems is not obtainable from this patient as he is not cooperative. PAST MEDICAL HISTORY    has a past medical history of Hep C w/ coma, chronic. SURGICAL HISTORY      has a past surgical history that includes Shoulder Closed Reduction; Orbital rim reconstruction (2009); Arm Surgery (Left, 10/26/2020); and back surgery (N/A, 1/5/2022).     CURRENT MEDICATIONS       Previous Medications    ARIPIPRAZOLE (ABILIFY) 5 MG TABLET    Take 5 mg by mouth daily    HYDROXYZINE (VISTARIL) 50 MG CAPSULE    Take 50 mg by mouth 2 times daily TRAZODONE (DESYREL) 50 MG TABLET    Take 50 mg by mouth nightly    VENLAFAXINE (EFFEXOR) 75 MG TABLET    Take 75 mg by mouth daily       ALLERGIES     is allergic to asa [aspirin] and ibuprofen. FAMILY HISTORY     He indicated that his mother is . He indicated that his father is alive. family history includes Alcohol Abuse in his father; Liver Disease in his father; No Known Problems in his mother. SOCIAL HISTORY      reports that he has been smoking cigarettes. He has been smoking about 2.00 packs per day. His smokeless tobacco use includes chew. He reports that he does not drink alcohol and does not use drugs. PHYSICAL EXAM     INITIAL VITALS:  height is 5' 9\" (1.753 m) and weight is 200 lb (90.7 kg). His temperature is 98.3 °F (36.8 °C). His blood pressure is 153/85 (abnormal) and his pulse is 84. His respiration is 20 and oxygen saturation is 98%. Constitutional: He is standing and holding onto the door, refuses to get into the bed. Eyes:  Pupils are equal and reactive, extraocular muscles intact   HENT:  Atraumatic appearing  oropharynx moist, no pharyngeal exudates. Neck- normal range of motion, supple   Respiratory:  No wheezing, rhonchi or rales  Cardiovascular: regular  GI:  Non tender, no rigidity, rebound or guarding  Musculoskeletal:  2/4 distal pulses, no pitting edema  Integument: warm and dry  Neurologic:  Alert & oriented x 3, cranial nerves II through XII are grossly intact. Equal strength in the upper and lower extremities bilaterally. Psychiatric: Bizarre affect, appears paranoid         DIAGNOSTIC RESULTS     EKG: All EKG's are interpreted by the Emergency Department Physician who either signs or Co-signs this chart in the absence of a cardiologist.  EKG interpreted by me showing sinus rhythm at a rate of 71, QRS of 94, QTc of 406, axis of -14 with no ischemic changes.     RADIOLOGY: non-plain film images(s) such as CT, Ultrasound and MRI are read by the

## 2022-03-27 NOTE — BH NOTE
Chief Complaint: Paranoia    Provisional Diagnosis: Stimulant Use Disorder, Major Depressive Disorder or Bipolar Disorder (all per history)    Risk, Psychosocial and Contextual Factors: Recent relapse on methamphetamine, has good social support. Current MH Treatment: Patient is prescribed Effexor, 75 mg. (1QD) by Morgan Garcia. He denies consistent medication compliance. Present Suicidal Behavior:    Verbal: \"I don't know. .. sort of. It won't go away\". Attempt: Patient denies. Access to Weapons: Patient denies. C-SSRS Current Suicide Risk: Low, Moderate or High: Moderate. Past Suicidal Behavior:    Verbal: Patient denies. Attempts: Patient denies. Self-Injurious/Self-Mutilation: Patient denies. Traumatic Event Within Past 2 Weeks: Patient reports recent break up with his girlfriend. He also reports methamphetamine usage on Friday. Current Abuse: Patient denies. Legal: Per collateral from his sister, patient was released from the [facility] 4-5 months ago. He is currently on probation in [place]. Violence: Patient denies. Protective Factors: Connection to outpatient provider, no significant physical illness, employment    Housing: Patient reports renting a home with his girlfriend. CPAP/Oxygen/Ambulation Difficulties: No ambulation difficulties indicated or observed. Basic Vital Signs: Vital signs were normative. Critical Labs: Labs not back yet; patient unable to provide urine specimen. Risk Factors: Use of illicit substances    Clinical Summary:      Clinician is a 39year old  male who presents to [facility] ED by driving himself and states complaints of chest pain. Upon being taken to a ED room, patient began exhibiting agitated and paranoid behaviors and continually tried to leave. Clinician went and spoke to patient. He was not forthcoming with information.  Patient reports he may be suicidal. He denies any past suicide attempts and denies any history of self-injurious behavior. Patient denies homicidal or violent ideation. The patient is paranoid that someone is going to hurt him or his family (\"I'm scared I'm going to get shot\". .. \"they're probably already dead\"). Patient reports he was in a car vikash on his way to [facility] and does not know who was chasing him. He states these things are happening because \"I did something bad a long time ago. .. people do bad things for money\". Patient states that he receives Effexor, 75 mg. (1QD), from his PCP Jefry Teran); he denies medication compliance currently. He is currently employed, though would not share as to where. Patient denies any history of abuse. He denies any legal issues. He denies any alcohol use and states that he \"had some stuff\" (ie methamphetamine) on Friday; per encounter history he also has history of Opioid Use Disorder. He did present to the ED on 5/7/2020 due to paranoia that was subsequently related to methamphetamine usage, per note. Patient denies any hallucinations. Delusions or paranoia and persecution are evident. Patient [facility] by medical provider. Collateral information about patient was provided by his younger sister Rimma whose car he had taken without permission to come to [facility]. Rimma reports that patient was released from the [facility] 4-5 months ago, is on probation in [facility], and has been doing great in his recovery (has history of methamphetamine and opioid usage). However, patient relapsed on methamphetamine on Friday and Shanks reports patient has been using continually since. Rimma has spoke to patient's  and they plan to [employer] [employer] [facility] [employer] patient tomorrow and have him sit for approximately 7 days. In regards to sleep, clinician unable to assess how many hours the patient sleeps per night or information regarding sleep efficacy or any use of OTC or prescription sleep aids.  On the PHQ-9, patient denies in the last two weeks feeling down/depressed/hopeless or having little interest/pleasure in doing things. Throughout the assessment, patient's facial expression was worried. His affect was inappropriate. He was hyperalert with increased motor activity and evasive interview behavior. Patient was occasionally physically threatening by stance and proximity. He was withdrawn, impulsive, exit seeking, guarded, and agitated. His mood was guilty, anxious, and suspicious. He was oriented to person, time, and place, but not situation. Thought processes were blocked and circumstantial. He has poor recent memory as well as poor judgment and poor insight. Level of Care Disposition:      Consulted with medical provider. Patient is medically cleared. Consulted with patients RN about abnormalities or medical concerns. No abnormalities or medical concerns noted. Consulted with Dr. Luann Juares. Patient to be admitted to .

## 2022-03-27 NOTE — ED NOTES
Prior to pt signing in to be seen, pt found in hallway near intake requesting to speak w/. He stated that he was driving and someone was chasing him and he wanted to speak w/officer. Pt has flat affect, appears agitated as evidence of fidgeting, pacing in hallway. Pt assisted to lobby and campus police called. A few minutes after pt provided report to campus police pt signed in as a pt and reported chest pain for two day. This nurse to ambulate pt to room 19. Upon approaching room 19 pt stated \"why are you putting me all the way back here? \" I explained to pt that this was the next available room. Upon arrival to room, pt continued to pace in room. Advised of need to obtain an EKG for his report of chest pain. Pt refusing to get in gown and started walking out of room and into hallway. This nurse to radio for additional assistance. [place] police now at bedside along with additional staff. Pt continues to state that he was \"being chased and left his car running and doesn't understand why he is being placed all the way here. \" Pt assisted back to room 19 w/staff and campus police.      Rimma Evans, RN  03/27/22 1084

## 2022-03-27 NOTE — ED NOTES
Pt moved back to room 27/ pt refusing to keep cardiac monitors on. Dr. Neal Saucedo to move patient. Pt refused to wear provided scrub pains. Pt took string out of pants. Patient placed in safe room that is ligature resistant with continuous monitoring in place. Provider notified, requested an assessment by behavioral health . Patient belongings secured in a locked lockers outside of the room. Explained suicide prevention precautions to the patient including constant observer.         Cameron Anderson RN  03/27/22 1800

## 2022-03-28 LAB
EKG ATRIAL RATE: 71 BPM
EKG P AXIS: 22 DEGREES
EKG P-R INTERVAL: 118 MS
EKG Q-T INTERVAL: 374 MS
EKG QRS DURATION: 94 MS
EKG QTC CALCULATION (BAZETT): 406 MS
EKG R AXIS: -14 DEGREES
EKG T AXIS: 11 DEGREES
EKG VENTRICULAR RATE: 71 BPM

## 2022-03-28 PROCEDURE — 1240000000 HC EMOTIONAL WELLNESS R&B

## 2022-03-28 PROCEDURE — 90792 PSYCH DIAG EVAL W/MED SRVCS: CPT | Performed by: PSYCHIATRY & NEUROLOGY

## 2022-03-28 PROCEDURE — 6370000000 HC RX 637 (ALT 250 FOR IP): Performed by: PSYCHIATRY & NEUROLOGY

## 2022-03-28 PROCEDURE — APPSS30 APP SPLIT SHARED TIME 16-30 MINUTES: Performed by: PHYSICIAN ASSISTANT

## 2022-03-28 RX ORDER — QUETIAPINE FUMARATE 100 MG/1
100 TABLET, FILM COATED ORAL 2 TIMES DAILY
Status: DISCONTINUED | OUTPATIENT
Start: 2022-03-28 | End: 2022-04-01 | Stop reason: HOSPADM

## 2022-03-28 RX ORDER — PANTOPRAZOLE SODIUM 40 MG/1
40 TABLET, DELAYED RELEASE ORAL
Status: DISCONTINUED | OUTPATIENT
Start: 2022-03-29 | End: 2022-04-01 | Stop reason: HOSPADM

## 2022-03-28 RX ADMIN — HYDROXYZINE HYDROCHLORIDE 50 MG: 25 TABLET, FILM COATED ORAL at 20:55

## 2022-03-28 RX ADMIN — QUETIAPINE FUMARATE 100 MG: 100 TABLET ORAL at 10:26

## 2022-03-28 RX ADMIN — TRAZODONE HYDROCHLORIDE 50 MG: 50 TABLET ORAL at 20:54

## 2022-03-28 RX ADMIN — QUETIAPINE FUMARATE 100 MG: 100 TABLET ORAL at 20:54

## 2022-03-28 RX ADMIN — HYDROXYZINE HYDROCHLORIDE 50 MG: 25 TABLET, FILM COATED ORAL at 10:26

## 2022-03-28 ASSESSMENT — PAIN SCALES - GENERAL
PAINLEVEL_OUTOF10: 0
PAINLEVEL_OUTOF10: 0

## 2022-03-28 ASSESSMENT — PAIN - FUNCTIONAL ASSESSMENT: PAIN_FUNCTIONAL_ASSESSMENT: ACTIVITIES ARE NOT PREVENTED
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
## 2022-03-28 NOTE — GROUP NOTE
Group Therapy Note    Date: 3/28/2022    Group Start Time: 1000  Group End Time: 0080  Group Topic: Recreational    [facility] Adult [facility] 4E    Twyla Batista MA, CTRS    Group Therapy Note    Attendees: 5         Pt did not attend 1000 recreation therapy group session. Pt received maximum encouragement to attend group from staff.     Signature:  Saintclair Kung, MA, [place]

## 2022-03-28 NOTE — PLAN OF CARE
Problem: Altered Mood, Depressive Behavior:  Goal: Able to verbalize and/or display a decrease in depressive symptoms  Description: Able to verbalize and/or display a decrease in depressive symptoms  Outcome: Ongoing  Note: Verbalizing being depressed 10/10. Notifying provider of patients current state. Educated patient on some coping skills, encouraged getting out of bed into the shower. Will continue to monitor. Problem: Altered Mood, Depressive Behavior:  Goal: Ability to disclose and discuss suicidal ideas will improve  Description: Ability to disclose and discuss suicidal ideas will improve  Outcome: Ongoing  Note: Patient denies suicidal ideations, with no plan or intent to harm self. Patient remains on suicidal precautions 15 checks for safety. Instructed to seek staff as needed for thoughts of self harm. Problem: Altered Mood, Depressive Behavior:  Goal: Absence of self-harm  Description: Absence of self-harm  Outcome: Ongoing  Note: No self harm behaviors were observed or reported so far this shift. Remains on every 15 minutes precautions for safety. Problem: Altered Mood, Psychotic Behavior:  Goal: Able to verbalize reality based thinking  Description: Able to verbalize reality based thinking  Outcome: Ongoing  Note: Patient reports mood 3/10 with 10 being normal. Has flat affect. Speech clear. Darty eye contact. Reports he \"don't know\" hope for future and identifies \"I don't think anyone\" as of now for their support system. Problem: Altered Mood, Psychotic Behavior:  Goal: Ability to interact with others will improve  Description: Ability to interact with others will improve  Outcome: Ongoing  Note: Patient is still in bed sleeping, Woke to do daily assessment and medications. Not wanting to get out of bed. Made a goal of \" Getting up and move around\". Encourage to get up and in the shower \"maybe\". Will continue to monitor.      Problem: Altered Mood, Psychotic Behavior:  Goal: Compliance with prescribed medication regimen will improve  Description: Compliance with prescribed medication regimen will improve  Outcome: Ongoing  Note: Patient took his AM medications and a PRN medication. Will continue to monitor. Care plan reviewed with patient. Patient verbalize understanding of the plan of care and contribute to goal setting.

## 2022-03-28 NOTE — PATIENT CARE CONFERENCE
[facility]  Initial Interdisciplinary Treatment Plan NOTE    Review Date & Time: 3/28/22 at [phone]    Patient was in treatment team.  See Multidisciplinary Treatment Team sheet for participants. Admission Type:   Admission Type: Involuntary    Reason for admission:  Reason for Admission: \"I don't know\" Reportedly, patient came to the ED for chest pain and was paranoid and agitated, thinking people were out to get him and shot him. Estimated Length of Stay Update:  3-5 days  Estimated Discharge Date Update: 4/1/2022    PATIENT STRENGTHS:  Patient Strengths Strengths: No significant Physical Illness,Positive Support  Patient Strengths and Limitations:Limitations: Inappropriate/potentially harmful leisure interests,Difficulty problem solving/relies on others to help solve problems  Addictive Behavior:Addictive Behavior  In the past 3 months, have you felt or has someone told you that you have a problem with:  : None  Do you have a history of Chemical Use?: Yes  Do you have a history of Alcohol Use?: No  Do you have a history of Street Drug Abuse?: Yes  Histroy of Prescripton Drug Abuse?: No  Medical Problems:  Past Medical History:   Diagnosis Date    Hep C w/ coma, chronic        EDUCATION:   Learner Progress Toward Treatment Goals: Reviewed goals and plan of care    Method: Individual    Outcome: Verbalized understanding    PATIENT GOALS: \"to get rid of this paranoia\"    PLAN/TREATMENT RECOMMENDATIONS UPDATE:   1. What is the most important thing we can help you with while you are here? Needs help with paranoia and stated he would like to have his Effexor resumed. 2. Who is your support system? His sister  3. [facility]  4. Do you have the ability to pay for your medications? Has [facility]  5. Where will you be residing when you leave the hospital?  [facility], on probation in [place].   Home vs. With probation officer  6. Will need a return to work slip or FMLA paper completion?    Yes      GOALS UPDATE:   Time frame for Short-Term Goals: 3-5 days    Idalmis Terry RN

## 2022-03-28 NOTE — GROUP NOTE
Group Therapy Note    Date: 3/28/2022    Group Start Time: 0915  Group End Time: 0940  Group Topic: Community Meeting    [facility] 4[facility]    Brenden Velásquez MA, CTRS    Group Therapy Note    Attendees: 5         Pt did not attend [phone] goal group and community meeting. Pt received maximum encouragement from staff to attend group.     Signature:  Brenden Velásquez MA, [address]

## 2022-03-28 NOTE — PLAN OF CARE
Problem: Role Relationship:  Goal: Ability to demonstrate positive changes in social behaviors and relationships will improve  Description: Ability to demonstrate positive changes in social behaviors and relationships will improve  Outcome: Ongoing  Note: Pt has not attended any groups that have been offered on the unit so far this shift. Pt has been in his room most of the day and has not been seen interacting with peers. Pt progress towards socialization goal is ongoing.

## 2022-03-28 NOTE — PROGRESS NOTES
Behavioral Services  Medicare Certification Upon Admission    I certify that this patient's inpatient psychiatric hospital admission is medically necessary for:    [x] (1) Treatment which could reasonably be expected to improve this patient's condition,       [x] (2) Or for diagnostic study;     AND     [x](2) The inpatient psychiatric services are provided while the individual is under the care of a physician and are included in the individualized plan of care.     Estimated length of stay/service 3-5 days    Plan for post-hospital care [facility]    Electronically signed by Glenny Taylor MD on 3/28/2022 at 7:59 AM

## 2022-03-28 NOTE — PROGRESS NOTES
Behavioral Health   Admission Note     Admission Type:   Admission Type: Involuntary    Reason for admission:  Reason for Admission: \"I don't know\" Reportedly, patient came to the ED for chest pain and was paranoid and agitated, thinking people were out to get him and shot him. PATIENT STRENGTHS:  Strengths: No significant Physical Illness,Positive Support    Patient Strengths and Limitations:  Limitations: Inappropriate/potentially harmful leisure interests,Difficulty problem solving/relies on others to help solve problems    Addictive Behavior:   Addictive Behavior  In the past 3 months, have you felt or has someone told you that you have a problem with:  : None  Do you have a history of Chemical Use?: Yes  Do you have a history of Alcohol Use?: No  Do you have a history of Street Drug Abuse?: Yes  Histroy of Prescripton Drug Abuse?: No    Medical Problems:   Past Medical History:   Diagnosis Date    Hep C w/ coma, chronic        Status EXAM:  Status and Exam  Normal: No  Facial Expression: Expressionless,Flat  Affect: Constricted  Level of Consciousness: Lethargic  Mood:Normal: No  Mood: Suspicious  Motor Activity:Normal: No  Motor Activity: Decreased  Interview Behavior: Evasive  Preception: [place] to Person,[place] to Place  Attention:Normal: No  Attention: Unable to Concentrate  Thought Processes: Circumstantial  Thought Content:Normal: No  Thought Content: Paranoia  Hallucinations: Other (Comment),Unable to assess (denies)  Delusions: Yes  Delusions: Other(See Comment) (paranoia)  Memory:Normal: No  Memory: Poor Recent  Insight and Judgment: No  Insight and Judgment: Poor Judgment,Poor Insight  Present Suicidal Ideation: No  Present Homicidal Ideation: No    Pt admitted with followings belongings:  Dental Appliances: None  Vision - Corrective Lenses: None  Hearing Aid: None  Jewelry: None  Body Piercings Removed: N/A  Clothing:  Elwanda Mehta / coat,Socks  Were All Patient Medications Collected?: Not Applicable  Other Valuables: None     Admission order obtained YES/NO: Yes. Valuables sent home with no one. Valuables placed in safe in security envelope, number:  n/a. Patient's home medications were n/a. Patient oriented to surroundings and program expectations and copy of patient rights given. Received admission packet:  Responses; yes/no/refused: Yes. Consents reviewed, signed Responses; yes/no/refused: No, patient psychotic and lethargic from medications given prior to arrival. Kellie Chaka Important Message from Alexa Judith About Your Rights\" form reviewed, signed YES/NO/NA: N/A. RefusedYES/NO/NA: N/A . Patient verbalize understanding: YES/NO/NA: no.    Patient education on precautions: YES/NO/NA: yes          Patient screened positive for suicide risk on CSSR-S (\"yes\" to question #4, 5, OR 6)  no. Physician notified of risk score. Orders received YES/NO/NA: N/A .  2 person skin assessment completed upon admission refused. Explained patients right to have family, representative or physician notified of their admission. Patient has Declined for physician to be notified. Patient has Declined for family/representative to be notified. Provided pt with [employer] handout entitled \"Quitting Smoking. \"  Reviewed handout with pt addressing dangers of smoking, developing coping skills, and providing basic information about quitting. Pt response to counseling:  refused    Admission summary: Patient arrived to the unit via wheelchair accompanied by a  and an emergency room tech. Reportedly, patient came in for chest pain and became paranoid and agitated, thinking people are out to get him and that he was shot. Patient was also reportedly wandering into others rooms in the emergency department and was given emergency medications. Patient supposedly relapsed on methamphetamines and has been doing this for the last 3 days. Patient does admit to methamphetamine use but is vague. Patient denies anxiety or depression upon admission as well as suicidal or homicidal thoughts. Patient is lethargic and appears paranoid but is pleasant and cooperative. Patient only alert to person and place. Due to current state, some of the admission assessment unable to be completed at this time.           Johny Mead RN

## 2022-03-28 NOTE — H&P
Department of Psychiatry  Psychiatric Assessment   Reason for Admission to Psychiatric Unit:  Acute disordered/bizarre behavior or psychomotor agitation or retardation;interferes with ADLs so that patient cannot function at a less intensive care level of care during evaluation and treatment   Concerns about patient's safety in the community    CHIEF COMPLAINT:  Psychosis     HISTORY OF PRESENT ILLNESS:      Valentin Slaughter is a 39 y.o. male with a history of polysubstance abuse who presented to the emergency department voluntarily for chest pain and psychosis. He was placed on an [facility] by the ED provider. Per the [facility] Emergency Department AN AFFILIATE OF [facility] system note: \"Upon being taken to a ED room, patient began exhibiting agitated and paranoid behaviors and continually tried to leave. Clinician went and spoke to patient. He was not forthcoming with information. Patient reports he may be suicidal. He denies any past suicide attempts and denies any history of self-injurious behavior. Patient denies homicidal or violent ideation. The patient is paranoid that someone is going to hurt him or his family (\"I'm scared I'm going to get shot\". .. \"they're probably already dead\"). Patient reports he was in a car vikash on his way to [facility] and does not know who was chasing him. He states these things are happening because \"I did something bad a long time ago. .. people do bad things for money\". Patient states that he receives Effexor, 75 mg. (1QD), from his PCP  Call); he denies medication compliance currently. He is currently employed, though would not share as to where. Patient denies any history of abuse. He denies any legal issues. He denies any alcohol use and states that he \"had some stuff\" (ie methamphetamine) on Friday; per encounter history he also has history of Opioid Use Disorder. He did present to the ED on 5/7/2020 due to paranoia that was subsequently related to methamphetamine usage, per note. Patient denies any hallucinations. Delusions or paranoia and persecution are evident. Patient [facility] by medical provider.   Collateral information about patient was provided by his younger sister Etelvina Moralez whose car he had taken without permission to come to [facility]. Etelvina Moralez reports that patient was released from the [facility] 4-5 months ago, is on probation in [facility], and has been doing great in his recovery (has history of methamphetamine and opioid usage). However, patient relapsed on methamphetamine on Friday and Etelvina Moralez reports patient has been using continually since. Etelvina Moralez has spoke to patient's  and they plan to [facility] patient tomorrow and have him sit for approximately 7 days. \"    Patient did receive IM Haldol and Ativan in the emergency department for agitation. He was making erratic movements and pacing per SW. He requested to be medicated. Ericka James reports he is admitted due to hallucinations. He states he has been seeing staff that \"ain't there. \"  When asked if he has been seeing anything specific, he says \"people chasing me in cars. \"  He reports he has had been experiencing these visual hallucinations for the past few days. He has been paranoid that people are trying to harm him or his family. He denies anyone specific trying to harm him. When asked about taking his sister's car prior to admission, patient reports he was using her car to get away from these people. He denies any auditory hallucinations. He reports he has dealt with visual hallucinations and paranoia in the past.  He says it comes and goes. He reports he has a history of schizophrenia and bipolar disorder. When asked what happens when he gets manic, he says he breaks down and starts seeing shit that aint there. \" he states he recently used a line of methamphetamine on Friday. He says he had not used methamphetamine for a \"long ass time\" prior to that. He denies any recent suicidal or homicidal ideation.   He reports he has not been sleeping very good.  His appetite has been here and there. Cady Henning continues to feel paranoid that people are trying to harm him outside of the hospital. He endorses feeling safe on the unit at this time. He denies any active visual hallucinations during the interview. He does appear to be responding to internal stimuli at times as he looks around the room. He denies any auditory hallucinations. Denies any suicidal or homicidal ideation. PSYCHIATRIC HISTORY:  ·  Patient reports he has a history of schizophrenia and bipolar disorder  · Outpatient psychiatric provider:  [facility] at [facility].  Sees a substance use disorder counselor at [facility]   · Suicide attempts: denies any  · Inpatient psychiatric admissions: denies any  · Home Medication Compliance: good per patient     Past psychiatric medications includes:     Effexor, Abilify, Trazodone, Vistaril    Adverse reactions from psychotropic medications:    no      Past Medical History:        Diagnosis Date    Hep C w/ coma, chronic        Past Surgical History:        Procedure Laterality Date    ARM SURGERY Left 10/26/2020    EXCISION LEFT LOWER FOREARM LIPOMA performed by Ester Maldonado MD at [address] [place] [address] 1/5/2022    EXCISION LIPOMA LOWER BACK performed by Ester Maldonado MD at [place] [address]    SHOULDER CLOSED REDUCTION         Medications Prior to Admission:   Medications Prior to Admission: omeprazole (PRILOSEC) 20 MG delayed release capsule, Take 20 mg by mouth daily  venlafaxine (EFFEXOR XR) 75 MG extended release capsule, Take 75 mg by mouth daily  hydrOXYzine (VISTARIL) 50 MG capsule, Take 50 mg by mouth 2 times daily  ARIPiprazole (ABILIFY) 5 MG tablet, Take 5 mg by mouth daily  traZODone (DESYREL) 50 MG tablet, Take 50 mg by mouth nightly  [DISCONTINUED] venlafaxine (EFFEXOR) 75 MG tablet, Take 75 mg by mouth daily    Allergies:  Asa [aspirin] and Ibuprofen    Social History:   [address] [facility] [address] [place] [address]   · RESIDENCE:  Currently lives in [place] with his girlfriend and grandparents  · LEVEL OF EDUCATION:  Graduated high school  · MARITAL STATUS: never   · CHILDREN: none  · OCCUPATION: works in concrete  · PATIENT ASSETS: family supportive     DRUG USE HISTORY  Social History     Tobacco Use   Smoking Status Current Every Day Smoker    Packs/day: 2.00    Types: Cigarettes   Smokeless Tobacco Current User    Types: Chew     Social History     Substance and Sexual Activity   Alcohol Use No     Social History     Substance and Sexual Activity   Drug Use Yes    Types: Methamphetamines (Crystal Meth)    Comment: did not elaborate     Patient used methamphetamine on Friday 3/25. UDS was not obtained. He has a history of methamphetamine and opioid abuse. Denies any alcohol use     LEGAL HISTORY:   HISTORY OF INCARCERATION: yes has been incarcerated in correction and [facility] in the past. Is on probation in [address] [facility] and Medical History:   Denies any family psychiatric history  denies suicides in family  Father alcohol use in family        Problem Relation Age of Onset    No Known Problems Mother     Alcohol Abuse Father     Liver Disease Father          Lifetime Psychiatric Review of Systems      ·    Obsessions and Compulsions: denies  ·    Nadia or Hypomania: denies  ·    Hallucinations: denies  ·    Panic Attacks:  denies   ·    Delusions:  denies  ·    Phobias: denies       Medical Review of Systems:     Constitutional: Negative for appetite change, diaphoresis, fatigue and fever. HENT: Negative for congestion, sore throat and tinnitus. Eyes: Negative for visual disturbance. Respiratory: Negative for cough, shortness of breath and wheezing. Cardiovascular: Negative for chest pain and leg swelling. Gastrointestinal: Negative for nausea, vomiting, diarrhea. Negative for abdominal pain. Genitourinary: Negative for frequency.    Musculoskeletal: Negative for arthralgias, myalgias and neck stiffness. Skin: Negative for puritis. Neurological: Negative for dizziness, weakness and headaches. All other systems reviewed and are negative. PHYSICAL EXAM:  Vitals:  BP (!) 106/54   Pulse 99   Temp 96 °F (35.6 °C) (Oral)   Resp 16   Ht 5' 9\" (1.753 m)   Wt 200 lb (90.7 kg)   SpO2 94%   BMI 29.53 kg/m²     Pain Level:  No pain reported       Physical Exam:    Constitutional: Well developed, well nourished, no acute distress  Eyes: Pupils round and reactive to light bilaterally  Neck:  Supple, no thyromegaly. Cardiovascular:  Normal rate and rhythm, normal S1 and S2. No murmur or gallop on auscultation. Radial pulses 2+ and brisk bilaterally  Lungs: Clear to auscultation bilaterally without wheezing or rales. Musculoskeletal:  Full range of motion in all four extremities. Neurologic:  Cranial nerves II through XII are grossly intact. Normal gait and station.        Mental Status Examination:    Level of consciousness:  awake  Appearance:  well-appearing, hospital attire, seated in bed, fair grooming and fair hygiene  Behavior/Motor:  Looking around the room   Attitude toward examiner:  cooperative, attentive and fair eye contact  Speech:  spontaneous, normal rate and normal volume  Mood:  Okay per patient   Affect:  Constricted   Thought processes:  linear, goal directed and coherent  Thought content:  Denies homicidal ideation  Suicidal Ideation:  denies suicidal ideation  Delusions:  paranoid and persecutory  Perceptual Disturbance:  denies any current perceptual disturbance  Cognition: Patient is oriented to person, place, time and situation  Concentration: clinically adequate  Memory: intact  Insight & Judgement: fair         DSM-5 DIAGNOSIS:    Acute psychosis  Rule out substance-induced psychosis, schizophrenia, schizoaffective disorder  Methamphetamine use disorder  History of opioid use disorder    Patient Active Problem List   Diagnosis    Lipoma of back    Acute psychosis ([facility] [address]          Psychosocial and Contextual Factors:   Substance use     Past Medical History:   Diagnosis Date    Hep C w/ coma, chronic         Goals:    Reviewed labs  Reviewed EKG  Will obtain records and review them today. Medication adjustment as discussed with the attending physician: We will add Seroquel 100 mg twice daily  Consults: none   Encouraged patient to engage in groups, milieu, and individual therapies offered as part of programing. Behavioral Services  Medicare Certification Upon Admission    I certify that this patient's inpatient psychiatric hospital admission is medically necessary for:   X (1) Treatment which could reasonably be expected to improve this patient's condition,      X (2) Or for diagnostic study;     AND     X (2) The inpatient psychiatric services are provided while the individual is under the care of a physician and are included in the individualized plan of care. Estimated length of stay/service: Greater than two midnights will be required to reach therapeutic levels of medications and to stabilize mood    Plan for post-hospital care: Follow up with outpatient psychiatric services    Electronically signed by Haroldo Feliciano PA-C on 3/28/2022 at 1:58 PM                                     Psychiatry Attending Attestation     I assessed this patient and reviewed the case and plan of care with Haroldo Feliciano PA-C. I have reviewed the above documentation and I agree with the findings and treatment plan with the following updates. Patient was evaluated by Haroldo Feliciano PA-C on the unit in person and I evaluated patient as Tele visit. Patient is a 21-year-old male with history of polysubstance abuse-cannabis methamphetamine admitted voluntarily for severe psychosis. Patient was extremely paranoid that there are people out there trying to hurt him. He reached out to the police department seeking help.   Reports that he was feeling scared that he will get shocked. Continues to be very paranoid here on the unit. Reports that he was not sleeping well for last several days since he used methamphetamine last Friday. That he did sleep better on the Seroquel last night. Discussed with him about continue to titrate Seroquel and is agreeable to the plan. TREATMENT PLAN    Risk Management:  close watch per standard protocol      Psychotherapy:  participation in milieu and group and individual sessions with Attending Physician,  and Physician Assistant/CNP      Estimated length of stay: It might take more than 2 midnights to stabilize patient's mood/thoughts and titrate medications to effect. GENERAL PATIENT/FAMILY EDUCATION  Patient will understand basic signs and symptoms, Patient will understand benefits/risks and potential side effects from proposed meds and Patient will understand their role in recovery. Family is  active in patient's care. Patient assets that may be helpful during treatment include: Intent to participate and engage in treatment, sufficient fund of knowledge and intellect to understand and utilize treatments. Risk level: High     Goals:    Reviewed labs  Will obtain records/collateral information and review them today. Medication adjustment: We will have him on Seroquel 100 mg twice daily  Consults: None      Jeannette Yusuf is a 39 y.o. male being evaluated by a Virtual Visit (video visit) encounter to address concerns as mentioned above. A caregiver was present in the room along with the patient. Patient is present at [address] [place] [address] [address] [place] [address] and I am physically present at my home in [facility]     This Virtual Visit was conducted with patient's consent. The patient is located in a state where I am licensed to provide care. --Alix Rob MD on 3/28/2022 at 5:50 PM    An electronic signature was used to authenticate this note.      **This report has been created using voice recognition software. It may contain minor errors which are inherent in voice recognition technology. **

## 2022-03-28 NOTE — ED NOTES
ED to inpatient nurses report    Chief Complaint   Patient presents with    Chest Pain      Present to ED from home  LOC: alert and orientated to name, place, date  Vital signs   Vitals:    03/27/22 1701 03/27/22 1730   BP: (!) 153/85 (!) 153/88   Pulse: 84 69   Resp: 20 19   Temp: 98.3 °F (36.8 °C)    SpO2: 98%    Weight: 200 lb (90.7 kg)    Height: 5' 9\" (1.753 m)       Oxygen Baseline none    Current needs required none   LDAs:   Peripheral IV 01/05/22 Left Wrist (Active)     Mobility: Independent  Pending ED orders: none  Present condition: stable    Electronically signed by Prabhakar Jaffe RN on 3/27/2022 at 9:10 PM       Eddie Evangelista RN  03/27/22 2110

## 2022-03-29 PROCEDURE — 99232 SBSQ HOSP IP/OBS MODERATE 35: CPT | Performed by: PSYCHIATRY & NEUROLOGY

## 2022-03-29 PROCEDURE — 6370000000 HC RX 637 (ALT 250 FOR IP): Performed by: PHYSICIAN ASSISTANT

## 2022-03-29 PROCEDURE — 6370000000 HC RX 637 (ALT 250 FOR IP): Performed by: PSYCHIATRY & NEUROLOGY

## 2022-03-29 PROCEDURE — APPSS30 APP SPLIT SHARED TIME 16-30 MINUTES: Performed by: PHYSICIAN ASSISTANT

## 2022-03-29 PROCEDURE — 1240000000 HC EMOTIONAL WELLNESS R&B

## 2022-03-29 RX ORDER — DICYCLOMINE HYDROCHLORIDE 10 MG/1
20 CAPSULE ORAL 4 TIMES DAILY PRN
Status: DISCONTINUED | OUTPATIENT
Start: 2022-03-29 | End: 2022-04-01 | Stop reason: HOSPADM

## 2022-03-29 RX ORDER — ONDANSETRON 4 MG/1
4 TABLET, ORALLY DISINTEGRATING ORAL ONCE
Status: COMPLETED | OUTPATIENT
Start: 2022-03-29 | End: 2022-03-29

## 2022-03-29 RX ORDER — ONDANSETRON 4 MG/1
4 TABLET, ORALLY DISINTEGRATING ORAL EVERY 8 HOURS PRN
Status: DISCONTINUED | OUTPATIENT
Start: 2022-03-29 | End: 2022-04-01 | Stop reason: HOSPADM

## 2022-03-29 RX ADMIN — QUETIAPINE FUMARATE 100 MG: 100 TABLET ORAL at 20:37

## 2022-03-29 RX ADMIN — PANTOPRAZOLE SODIUM 40 MG: 40 TABLET, DELAYED RELEASE ORAL at 08:14

## 2022-03-29 RX ADMIN — HYDROXYZINE HYDROCHLORIDE 50 MG: 25 TABLET, FILM COATED ORAL at 08:14

## 2022-03-29 RX ADMIN — TRAZODONE HYDROCHLORIDE 50 MG: 50 TABLET ORAL at 20:37

## 2022-03-29 RX ADMIN — ONDANSETRON 4 MG: 4 TABLET, ORALLY DISINTEGRATING ORAL at 09:34

## 2022-03-29 RX ADMIN — QUETIAPINE FUMARATE 100 MG: 100 TABLET ORAL at 08:14

## 2022-03-29 RX ADMIN — HYDROXYZINE HYDROCHLORIDE 50 MG: 25 TABLET, FILM COATED ORAL at 20:37

## 2022-03-29 ASSESSMENT — PAIN SCALES - GENERAL
PAINLEVEL_OUTOF10: 0
PAINLEVEL_OUTOF10: 0

## 2022-03-29 NOTE — GROUP NOTE
Group Therapy Note    Date: 3/29/2022    Group Start Time: 1100  Group End Time: [phone]  Group Topic: Healthy Living/Wellness    [facility] 4E    Luke Pryor LPN        Group Therapy Note    Attendees: 4             Notes:  Did not attend    Discipline Responsible: Licensed Practical Nurse, students      Signature:  Luke Pryor LPN

## 2022-03-29 NOTE — PLAN OF CARE
Problem: Altered Mood, Depressive Behavior:  Goal: Ability to disclose and discuss suicidal ideas will improve  Description: Ability to disclose and discuss suicidal ideas will improve  3/28/2022 2316 by Kaelyn Colvin LPN  Outcome: Met This Shift  Note: Patient denies any suicidal ideas at this point in shift nurses doing 15 minute safety checks   3/28/2022 1130 by Lori Freedman LPN  Outcome: Ongoing  Note: Patient denies suicidal ideations, with no plan or intent to harm self. Patient remains on suicidal precautions 15 checks for safety. Instructed to seek staff as needed for thoughts of self harm. Goal: Absence of self-harm  Description: Absence of self-harm  3/28/2022 2316 by Kaelyn Colvin LPN  Outcome: Met This Shift  Note: Patient free from self harm at this point in shift nurses doing 15 minute safety checks   3/28/2022 1130 by Lori Freedman LPN  Outcome: Ongoing  Note: No self harm behaviors were observed or reported so far this shift. Remains on every 15 minutes precautions for safety. Problem: Altered Mood, Psychotic Behavior:  Goal: Able to verbalize reality based thinking  Description: Able to verbalize reality based thinking  3/28/2022 2316 by Kaelyn Colvin LPN  Outcome: Met This Shift  Note: Patient paranoid and is unable to verbalize reality based thinking   3/28/2022 1130 by Lori Freedman LPN  Outcome: Ongoing  Note: Patient reports mood 3/10 with 10 being normal. Has flat affect. Speech clear. Darty eye contact. Reports he \"don't know\" hope for future and identifies \"I don't think anyone\" as of now for their support system.      Goal: Compliance with prescribed medication regimen will improve  Description: Compliance with prescribed medication regimen will improve  3/28/2022 2316 by Kaelyn Colvin LPN  Outcome: Met This Shift  Note: Patient is compliant with prescribed medication regimen   3/28/2022 1130 by Lori Freedman LPN  Outcome: Ongoing  Note: Patient took his AM medications and a PRN medication. Will continue to monitor. Problem: Activity:  Goal: Sleeping patterns will improve  Description: Sleeping patterns will improve  Outcome: Met This Shift  Note: Patient reports adequate amounts of sleep      Problem: Substance Abuse:  Goal: Participates in care planning  Description: Participates in care planning  Outcome: Ongoing  Note: Patient participates in care planning and goal setting      Problem: Discharge Planning:  Goal: Discharged to appropriate level of care  Description: Discharged to appropriate level of care  Outcome: Ongoing  Note: Discharge planning ongoing      Problem: Role Relationship:  Goal: Ability to demonstrate positive changes in social behaviors and relationships will improve  Description: Ability to demonstrate positive changes in social behaviors and relationships will improve  3/28/2022 2316 by Raheem Klein LPN  Outcome: Ongoing  3/28/2022 1301 by Donn Schilder  Outcome: Ongoing  Note: Pt has not attended any groups that have been offered on the unit so far this shift. Pt has been in his room most of the day and has not been seen interacting with peers. Pt progress towards socialization goal is ongoing. Intervention: Explore values, skills and interests  Note: Ongoing      Problem: Altered Mood, Depressive Behavior:  Goal: Able to verbalize and/or display a decrease in depressive symptoms  Description: Able to verbalize and/or display a decrease in depressive symptoms  3/28/2022 2316 by Raheem Klein LPN  Outcome: Not Met This Shift  Note: Patient states that he is still having some depression and does rate his mood an 8 out of 10 with 10 being the best   3/28/2022 1130 by Merrilyn Kanner, LPN  Outcome: Ongoing  Note: Verbalizing being depressed 10/10. Notifying provider of patients current state. Educated patient on some coping skills, encouraged getting out of bed into the shower. Will continue to monitor.       Problem: Altered Mood, Psychotic Behavior:  Goal: Ability to interact with others will improve  Description: Ability to interact with others will improve  3/28/2022 2316 by Dede Fournier LPN  Outcome: Not Met This Shift  Note: Patient isolated to room   3/28/2022 1130 by Kwesi Chow LPN  Outcome: Ongoing  Note: Patient is still in bed sleeping, Woke to do daily assessment and medications. Not wanting to get out of bed. Made a goal of \" Getting up and move around\". Encourage to get up and in the shower \"maybe\". Will continue to monitor. Problem: Anxiety:  Goal: Level of anxiety will decrease  Description: Level of anxiety will decrease  Outcome: Not Met This Shift  Note: Patient is paranoid and is having a lot of anxiety   Care plan reviewed with patient. Patient verbalize understanding of the plan of care and contribute to goal setting.

## 2022-03-29 NOTE — PROGRESS NOTES
This RN has reviewed and agrees with Waqas Davis LPN's data collection and has collaborated with this LPN regarding the patient's care plan.

## 2022-03-29 NOTE — PROGRESS NOTES
This RN has reviewed and agrees with Sudeep Hunt LPN's data collection and has collaborated with this LPN regarding the patient's care plan.

## 2022-03-29 NOTE — PROGRESS NOTES
Group Therapy Note    Date: 3/28/2022  Start Time: 2000  End Time:  2030    Type of Group: Wrap-Up    Patient's Goal:  To move more    Notes:  Patient goal met     Status After Intervention:  Improved    Participation Level:  Active Listener and Interactive    Participation Quality: Appropriate, Attentive, Sharing and Supportive      Speech:  normal      Thought Process/Content: Logical      Affective Functioning: Congruent      Mood: anxious, depressed and irritable      Level of consciousness:  Oriented x4      Response to Learning: Able to verbalize current knowledge/experience      Endings: None Reported    Modes of Intervention: Education, Support, Socialization and Problem-solving      Discipline Responsible: Licensed Practical Nurse      Signature:  Marietta Juarez LPN

## 2022-03-29 NOTE — PROGRESS NOTES
disintegrating tablet 4 mg, 4 mg, Oral, Q8H PRN  dicyclomine (BENTYL) capsule 20 mg, 20 mg, Oral, 4x Daily PRN  QUEtiapine (SEROQUEL) tablet 100 mg, 100 mg, Oral, BID  pantoprazole (PROTONIX) tablet 40 mg, 40 mg, Oral, QAM AC  acetaminophen (TYLENOL) tablet 650 mg, 650 mg, Oral, Q4H PRN  magnesium hydroxide (MILK OF MAGNESIA) 400 MG/5ML suspension 30 mL, 30 mL, Oral, Daily PRN  aluminum & magnesium hydroxide-simethicone (MAALOX) 200-200-20 MG/5ML suspension 30 mL, 30 mL, Oral, Q6H PRN  hydrOXYzine (ATARAX) tablet 50 mg, 50 mg, Oral, TID PRN  traZODone (DESYREL) tablet 50 mg, 50 mg, Oral, Nightly PRN     Physical     height is 5' 9\" (1.753 m) and weight is 200 lb (90.7 kg). His tympanic temperature is 97.8 °F (36.6 °C). His blood pressure is 103/55 (abnormal) and his pulse is 85. His respiration is 17 and oxygen saturation is 97%.    Lab Results   Component Value Date    WBC 11.4 (H) 03/27/2022    HGB 15.8 03/27/2022    HCT 47.4 03/27/2022     03/27/2022    CHOL 169 09/26/2019    TRIG 84 09/26/2019    HDL 38 (L) 12/03/2021    ALT 16 03/27/2022    AST 26 03/27/2022     03/27/2022    K 3.8 03/27/2022     03/27/2022    CREATININE 1.0 03/27/2022    BUN 12 03/27/2022    CO2 22 (L) 03/27/2022    TSH 1.12 12/03/2021    INR 0.89 10/14/2020          Mental Status Exam:   Level of consciousness:  somnolent   Appearance:  well-appearing, hospital attire, laying in bed fair grooming and fair hygiene  Behavior/Motor:  No abnormalities noted  Attitude toward examiner:  cooperative, attentive and fair eye contact  Speech:  spontaneous, normal rate and low volume  Mood:  alright per patient   Affect:  Constricted   Thought processes:  linear, goal directed and coherent  Thought content:  Denies homicidal ideation  Suicidal Ideation:  denies suicidal ideation  Delusions:  paranoid  Perceptual Disturbance:  auditory; denies any current perceptual disturbance  Cognition: Patient is oriented to person, place, time and situation  Concentration: clinically adequate  Memory: intact  Insight & Judgement: fair       ASSESSMENT     Acute psychosis ([facility] [address]   Rule out substance-induced psychosis, schizophrenia, schizoaffective disorder  Methamphetamine use disorder  History of opioid use disorder    PLAN    Patient's symptoms show minimal improvement today  Medication adjustments as discussed with the attending physician: Will add Zofran 4mg q8h PRN for nausea. Will also add Bentyl 20mg 4 times daily PRN  Attempt to develop insight, psycho-education and supportive therapy conducted. Probable discharge: To be determined  Follow-up: To be determined outpatient, daily while on inpatient unit    Electronically signed by Alona Nunes PA-C on 3/29/2022 at 1:00 PM Reviewed patient's current plan of care and vital signs with nursing staff. **This report has been created using voice recognition software. It may contain minor errors which are inherent in voice recognition technology. **                                          Psychiatry Attending Attestation     I independently saw and evaluated the patient. I reviewed the Advance Practice Provider's documentation above. Any additional comments or changes to the Advance Practice Provider's documentation are stated below otherwise agree with assessment. Patient remains largely isolated to the room. Reports dealing with severe nausea and stomach cramps. Mentions that he continues to feel like people are out there trying to hurt him. Reports having trouble falling asleep and staying asleep. Social work mentions that he currently has a police hold    PLAN  Patient s symptoms   are improving  We will continue to titrate Seroquel  We will add Zofran and Bentyl to help with nausea and stomach cramps. Attempt to develop insight  Psycho-education conducted. Supportive Therapy conducted.   Probable discharge is 2 to 3 days  Follow-up [facility]    Electronically signed by Lucinda Kumar MD on 3/29/22 at 3:31 PM EDT

## 2022-03-29 NOTE — PLAN OF CARE
Problem: Altered Mood, Depressive Behavior:  Goal: Able to verbalize and/or display a decrease in depressive symptoms  Description: Able to verbalize and/or display a decrease in depressive symptoms  3/29/2022 0948 by Skye Rinaldi LPN  Outcome: Ongoing  Note: Patient verbalizes having depressive symptoms, rates mood #4, has blunt, sad, depressed and anxious affect, will continue to monitor  3/28/2022 2316 by Jerardo Stock LPN  Outcome: Not Met This Shift  Note: Patient states that he is still having some depression and does rate his mood an 8 out of 10 with 10 being the best   Goal: Ability to disclose and discuss suicidal ideas will improve  Description: Ability to disclose and discuss suicidal ideas will improve  3/29/2022 0948 by Skye Rinaldi LPN  Outcome: Met This Shift  Note: Patient denies suicidal ideations  3/28/2022 2316 by Jerardo Stock LPN  Outcome: Met This Shift  Note: Patient denies any suicidal ideas at this point in shift nurses doing 15 minute safety checks   Goal: Absence of self-harm  Description: Absence of self-harm  3/29/2022 0948 by Skye Rinaldi LPN  Outcome: Met This Shift  Note: No self harm  3/28/2022 2316 by Jerardo Stock LPN  Outcome: Met This Shift  Note: Patient free from self harm at this point in shift nurses doing 15 minute safety checks      Problem: Altered Mood, Psychotic Behavior:  Goal: Able to verbalize reality based thinking  Description: Able to verbalize reality based thinking  3/29/2022 0948 by Skye Rinaldi LPN  Outcome: Met This Shift  Note: Patient oriented X4  3/28/2022 2316 by Jerardo Stock LPN  Outcome: Met This Shift  Note: Patient paranoid and is unable to verbalize reality based thinking   Goal: Ability to interact with others will improve  Description: Ability to interact with others will improve  3/29/2022 0948 by Skye Rinaldi LPN  Outcome: Not Met This Shift  Note: Patient does not interact with peers, isolative to bed, will monitor  3/28/2022 2316 by Ariel Garsia LPN  Outcome: Not Met This Shift  Note: Patient isolated to room   Goal: Compliance with prescribed medication regimen will improve  Description: Compliance with prescribed medication regimen will improve  3/29/2022 0948 by Mere Ren LPN  Outcome: Met This Shift  Note: Compliant with medications this shift, will monitor  3/28/2022 2316 by Ariel Garsia LPN  Outcome: Met This Shift  Note: Patient is compliant with prescribed medication regimen      Problem: Substance Abuse:  Goal: Participates in care planning  Description: Participates in care planning  3/29/2022 0948 by Mere Ren LPN  Outcome: Ongoing  Note: Encouragement is given to attend groups  3/28/2022 2316 by Ariel Garsia LPN  Outcome: Ongoing  Note: Patient participates in care planning and goal setting      Problem: Discharge Planning:  Goal: Discharged to appropriate level of care  Description: Discharged to appropriate level of care  3/29/2022 0948 by Mere Ren LPN  Outcome: Ongoing  Note: Patient has an active warrant in [facility] And is to go to [facility] at discharge  3/28/2022 2316 by Ariel Garsia LPN  Outcome: Ongoing  Note: Discharge planning ongoing      Problem: Anxiety:  Goal: Level of anxiety will decrease  Description: Level of anxiety will decrease  3/29/2022 0948 by Mere Ren LPN  Outcome: Ongoing  Note: Medication given for anxiety, see MAR  3/28/2022 2316 by Ariel Garsia LPN  Outcome: Not Met This Shift  Note: Patient is paranoid and is having a lot of anxiety      Problem:  Activity:  Goal: Sleeping patterns will improve  Description: Sleeping patterns will improve  3/29/2022 0948 by Mere Ren LPN  Outcome: Ongoing  Note: Patient slept 8.5 hours during the night, will continue to monitor  3/28/2022 2316 by Ariel Garsia LPN  Outcome: Met This Shift  Note: Patient reports adequate amounts of sleep      Problem: Role Relationship:  Goal: Ability to demonstrate positive changes in social behaviors and relationships will improve  Description: Ability to demonstrate positive changes in social behaviors and relationships will improve  3/29/2022 0948 by Cammy Orourke LPN  Outcome: Ongoing  Note: Patient does not interact with peers, patient is isolative to his bed  3/28/2022 2316 by Marilee Dandy, LPN  Outcome: Ongoing  Intervention: Explore values, skills and interests  3/28/2022 2316 by Marilee Dandy, LPN  Note: Ongoing     Care plan reviewed with patient . Patient  verbalizes understanding of the plan of care and contributes to goal setting.

## 2022-03-30 PROCEDURE — 99232 SBSQ HOSP IP/OBS MODERATE 35: CPT | Performed by: PSYCHIATRY & NEUROLOGY

## 2022-03-30 PROCEDURE — 90833 PSYTX W PT W E/M 30 MIN: CPT | Performed by: PSYCHIATRY & NEUROLOGY

## 2022-03-30 PROCEDURE — 6370000000 HC RX 637 (ALT 250 FOR IP): Performed by: PSYCHIATRY & NEUROLOGY

## 2022-03-30 PROCEDURE — APPSS30 APP SPLIT SHARED TIME 16-30 MINUTES: Performed by: PHYSICIAN ASSISTANT

## 2022-03-30 PROCEDURE — 6370000000 HC RX 637 (ALT 250 FOR IP): Performed by: PHYSICIAN ASSISTANT

## 2022-03-30 PROCEDURE — 1240000000 HC EMOTIONAL WELLNESS R&B

## 2022-03-30 RX ORDER — VENLAFAXINE HYDROCHLORIDE 37.5 MG/1
37.5 CAPSULE, EXTENDED RELEASE ORAL
Status: DISCONTINUED | OUTPATIENT
Start: 2022-03-31 | End: 2022-03-31

## 2022-03-30 RX ADMIN — ONDANSETRON 4 MG: 4 TABLET, ORALLY DISINTEGRATING ORAL at 08:48

## 2022-03-30 RX ADMIN — DICYCLOMINE HYDROCHLORIDE 20 MG: 10 CAPSULE ORAL at 08:48

## 2022-03-30 RX ADMIN — QUETIAPINE FUMARATE 100 MG: 100 TABLET ORAL at 21:29

## 2022-03-30 RX ADMIN — HYDROXYZINE HYDROCHLORIDE 50 MG: 25 TABLET, FILM COATED ORAL at 21:30

## 2022-03-30 RX ADMIN — TRAZODONE HYDROCHLORIDE 50 MG: 50 TABLET ORAL at 21:30

## 2022-03-30 RX ADMIN — QUETIAPINE FUMARATE 100 MG: 100 TABLET ORAL at 08:48

## 2022-03-30 RX ADMIN — DICYCLOMINE HYDROCHLORIDE 20 MG: 10 CAPSULE ORAL at 21:28

## 2022-03-30 RX ADMIN — PANTOPRAZOLE SODIUM 40 MG: 40 TABLET, DELAYED RELEASE ORAL at 08:48

## 2022-03-30 ASSESSMENT — PAIN SCALES - GENERAL
PAINLEVEL_OUTOF10: 0
PAINLEVEL_OUTOF10: 0

## 2022-03-30 NOTE — PROGRESS NOTES
Department of Psychiatry  Progress Note     Chief Complaint:  Psychosis     SUBJECTIVE:    PROGRESS:  Christina Harris is seen laying in bed. He is somnolent but cooperative with the interview. Christina Harris reports he is doing alright today. Rates his mood 7 out of 10 with 10 being the best.  When asked about hallucinations, patient reports he had a little bit of auditory hallucinations. He denies any so far today. He said he could make out what they are saying. Denied any command hallucinations to harm himself or others. He continues to feel somewhat paranoid that people were trying to harm him and his family outside of the hospital.  He does endorse feeling safe on the unit. He denies any suicidal or homicidal ideation. He reports he is depressed but denies anything specific making him feel depressed. He reports he is anxious about \"whats going to happen when I leave here. \" He reports he slept good last night. Staff reported he slept 8.5 hours continuous. He reports he continues to feel nauseous at times. He says his appetite has been good. He has not vomited. He has been hydrating adequately. He has been compliant with his medications and denies any side effects. He has been isolative to his room and does not attend groups. He has been talking to his sister on the phone which has been going good.     Suicidal ideations: denies    Compliance with medications: good   Medication side effects: absent  ROS: Patient has new complaints: nausea   Sleep quality: 8.5 hours continuous last night per staff  Attending groups: no      OBJECTIVE      Medications  Current Facility-Administered Medications: ondansetron (ZOFRAN-ODT) disintegrating tablet 4 mg, 4 mg, Oral, Q8H PRN  dicyclomine (BENTYL) capsule 20 mg, 20 mg, Oral, 4x Daily PRN  QUEtiapine (SEROQUEL) tablet 100 mg, 100 mg, Oral, BID  pantoprazole (PROTONIX) tablet 40 mg, 40 mg, Oral, QAM AC  acetaminophen (TYLENOL) tablet 650 mg, 650 mg, Oral, Q4H PRN  magnesium hydroxide (MILK OF MAGNESIA) 400 MG/5ML suspension 30 mL, 30 mL, Oral, Daily PRN  aluminum & magnesium hydroxide-simethicone (MAALOX) 200-200-20 MG/5ML suspension 30 mL, 30 mL, Oral, Q6H PRN  hydrOXYzine (ATARAX) tablet 50 mg, 50 mg, Oral, TID PRN  traZODone (DESYREL) tablet 50 mg, 50 mg, Oral, Nightly PRN     Physical     height is 5' 9\" (1.753 m) and weight is 200 lb (90.7 kg). His oral temperature is 97.5 °F (36.4 °C). His blood pressure is 100/81 and his pulse is 62. His respiration is 16 and oxygen saturation is 98%.    Lab Results   Component Value Date    WBC 11.4 (H) 03/27/2022    HGB 15.8 03/27/2022    HCT 47.4 03/27/2022     03/27/2022    CHOL 169 09/26/2019    TRIG 84 09/26/2019    HDL 38 (L) 12/03/2021    ALT 16 03/27/2022    AST 26 03/27/2022     03/27/2022    K 3.8 03/27/2022     03/27/2022    CREATININE 1.0 03/27/2022    BUN 12 03/27/2022    CO2 22 (L) 03/27/2022    TSH 1.12 12/03/2021    INR 0.89 10/14/2020          Mental Status Exam:   Level of consciousness:  somnolent   Appearance:  well-appearing, hospital attire, laying in bed, fair grooming and fair hygiene  Behavior/Motor:  No abnormalities noted  Attitude toward examiner:  cooperative, attentive and fair eye contact  Speech:  spontaneous, normal rate and low volume  Mood:  alright per patient   Affect:  Constricted   Thought processes:  linear, goal directed and coherent  Thought content:  Denies homicidal ideation  Suicidal Ideation:  denies suicidal ideation  Delusions:  paranoid  Perceptual Disturbance:  auditory; denies any current perceptual disturbance  Cognition: Patient is oriented to person, place, time and situation  Concentration: clinically adequate  Memory: intact  Insight & Judgement: fair       ASSESSMENT     Acute psychosis ([facility] [address]   Rule out substance-induced psychosis, schizophrenia, schizoaffective disorder  Methamphetamine use disorder  History of opioid use disorder    PLAN    Patient's symptoms show minimal improvement today  Medication adjustments as discussed with the attending physician: Will continue current medication regimen and observe today    Attempt to develop insight, psycho-education and supportive therapy conducted. Probable discharge: To be determined  Follow-up: To be determined outpatient, daily while on inpatient unit    Electronically signed by Leah Coleman PA-C on 3/30/2022 at 4:10 PM Reviewed patient's current plan of care and vital signs with nursing staff. **This report has been created using voice recognition software. It may contain minor errors which are inherent in voice recognition technology. **                                          Psychiatry Attending Attestation     I independently saw and evaluated the patient. I reviewed the Advance Practice Provider's documentation above. Any additional comments or changes to the Advance Practice Provider's documentation are stated below otherwise agree with assessment. Patient feels better than before. Mood and affect are better. Reports that suicidal thoughts are improving. Mentions that he continues to have some vague paranoia that people are out there trying to hurt him. Was requesting to put him on Effexor as it helped him the best in past.  Denies any homicidal thoughts, that was explored with the patient. Oriented to time place and person. Recent and remote memory is intact. Patient feels hopeful. Sleep and appetite is good. No side effect from medication reported. Side-effect of medication were discussed with the patient . Patient is responding to current treatment. Discharge soon, if patient continues to show improvement. Case discussed with the staff. PLAN  Patient s symptoms are improving  Start him on Effexor and titrate to effect  Attempt to develop insight  Psycho-education conducted. Supportive Therapy conducted.   Probable discharge is 1 to 2 days  Follow-up TBD  More than 16minutes of the session was spent doing supportive psychotherapy. Session started around 9:30 AM today ended at around 10 AM today.     Electronically signed by Paola Lozada MD on 3/30/22 at 4:52 PM EDT

## 2022-03-30 NOTE — PLAN OF CARE
Patient has not attended any of the groups today and has not been out of his room for social interaction so he has not met his socialization goal for this shift. Encourage patient to attend all groups on the unit daily and to come out of his room for social interaction with others during the rest of his hospital stay.

## 2022-03-30 NOTE — PLAN OF CARE
Problem: Altered Mood, Depressive Behavior:  Goal: Able to verbalize and/or display a decrease in depressive symptoms  Description: Able to verbalize and/or display a decrease in depressive symptoms  3/30/2022 1210 by Brittni Reza RN  Outcome: Ongoing  Note: Reports some depression at this time. Rates mood as a 7/10     Problem: Altered Mood, Depressive Behavior:  Goal: Ability to disclose and discuss suicidal ideas will improve  Description: Ability to disclose and discuss suicidal ideas will improve  3/30/2022 1210 by Brittni Reza RN  Outcome: Ongoing  Note: Patient denies suicidal thoughts at this time. Problem: Altered Mood, Depressive Behavior:  Goal: Absence of self-harm  Description: Absence of self-harm  3/30/2022 1210 by Brittni Reza RN  Outcome: Ongoing  Note: Patient denies thoughts of self harm at this time. Problem: Altered Mood, Psychotic Behavior:  Goal: Able to verbalize reality based thinking  Description: Able to verbalize reality based thinking  3/30/2022 1210 by Brittni Reza RN  Outcome: Ongoing  Note: Patient alert and oriented times 4     Problem: Altered Mood, Psychotic Behavior:  Goal: Ability to interact with others will improve  Description: Ability to interact with others will improve  3/30/2022 1210 by Brittni Reza RN  Outcome: Ongoing  Note: Patient isolative to room so far this shift. Problem: Altered Mood, Psychotic Behavior:  Goal: Compliance with prescribed medication regimen will improve  Description: Compliance with prescribed medication regimen will improve  3/30/2022 1210 by Brittni Reza RN  Outcome: Ongoing  Note: Patient taking medications as prescribed. Problem: Substance Abuse:  Goal: Participates in care planning  Description: Participates in care planning  3/30/2022 1210 by Brittni Reza RN  Outcome: Ongoing  Note: Patient participates in care planning and goal setting this shift.       Problem: Discharge Planning:  Goal: Discharged to appropriate level of care  Description: Discharged to appropriate level of care  3/30/2022 1210 by Ezekiel Granger RN  Outcome: Ongoing  Note: Discharge planning ongoing at this time. Problem: Anxiety:  Goal: Level of anxiety will decrease  Description: Level of anxiety will decrease  3/30/2022 1210 by Ezekiel Granger RN  Outcome: Ongoing  Note: Patient reports some generalized anxiety that is improving during this shift. Rates mood as a 7/10. Problem: Activity:  Goal: Sleeping patterns will improve  Description: Sleeping patterns will improve  3/30/2022 1210 by Ezekiel Granger RN  Outcome: Ongoing  Note: Patient reports sleeping well 8.5 hours continuous. Problem: Role Relationship:  Goal: Ability to demonstrate positive changes in social behaviors and relationships will improve  Description: Ability to demonstrate positive changes in social behaviors and relationships will improve  3/30/2022 1210 by Ezekiel Granger RN  Outcome: Ongoing   Care plan reviewed with patient.   Patient does verbalize understanding of the plan of care and does contribute to goal setting

## 2022-03-30 NOTE — PROGRESS NOTES
Discharge planning-Cedric ricardo has a scheduled follow up with [facility] on 04/26/22 at 3:40 pm.

## 2022-03-30 NOTE — PLAN OF CARE
Problem: Altered Mood, Depressive Behavior:  Goal: Ability to disclose and discuss suicidal ideas will improve  Description: Ability to disclose and discuss suicidal ideas will improve  Outcome: Met This Shift  Note: Patient denies any suicidal ideas   Goal: Absence of self-harm  Description: Absence of self-harm  Outcome: Met This Shift  Note: Patient free from self harm      Problem: Altered Mood, Psychotic Behavior:  Goal: Able to verbalize reality based thinking  Description: Able to verbalize reality based thinking  Outcome: Met This Shift  Note: Patient able to verbalize reality based thinking   Goal: Compliance with prescribed medication regimen will improve  Description: Compliance with prescribed medication regimen will improve  Outcome: Met This Shift  Note: Patient compliment with prescribed medication regimen        Problem:  Activity:  Goal: Sleeping patterns will improve  Description: Sleeping patterns will improve  Outcome: Met This Shift  Note: Patient reports adequate sleep     Problem: Discharge Planning:  Goal: Discharged to appropriate level of care  Description: Discharged to appropriate level of care  Outcome: Ongoing  Note: Ongoing      Problem: Role Relationship:  Goal: Ability to demonstrate positive changes in social behaviors and relationships will improve  Description: Ability to demonstrate positive changes in social behaviors and relationships will improve  3/30/2022 0005 by Leonarda Treadwell LPN  Outcome: Ongoing  Note: Ongoing   3/29/2022 1313 by Beatrice Ramirez  Outcome: Not Met This Shift     Problem: Altered Mood, Depressive Behavior:  Goal: Able to verbalize and/or display a decrease in depressive symptoms  Description: Able to verbalize and/or display a decrease in depressive symptoms  Outcome: Not Met This Shift  Note: Patient states that his mood is a 6 out of 10 with 10 being the best and states that he is still is having depression      Problem: Altered Mood, Psychotic Behavior:  Goal: Ability to interact with others will improve  Description: Ability to interact with others will improve  Outcome: Not Met This Shift  Note: Patient isolated to room      Problem: Substance Abuse:  Goal: Participates in care planning  Description: Participates in care planning  Outcome: Not Met This Shift  Note: Patient isolated to room and does not participate in care planning      Problem: Anxiety:  Goal: Level of anxiety will decrease  Description: Level of anxiety will decrease  Outcome: Not Met This Shift  Note: Patient reports some anxiety medication given to help   Care plan reviewed with patient. Patient verbalize understanding of the plan of care and contribute to goal setting.

## 2022-03-30 NOTE — BH NOTE
This RN has reviewed and agrees with Tay Page LPN's data collection and has collaborated with this LPN regarding the patient's care plan.

## 2022-03-31 PROCEDURE — 6370000000 HC RX 637 (ALT 250 FOR IP): Performed by: PSYCHIATRY & NEUROLOGY

## 2022-03-31 PROCEDURE — APPSS30 APP SPLIT SHARED TIME 16-30 MINUTES: Performed by: PHYSICIAN ASSISTANT

## 2022-03-31 PROCEDURE — 90833 PSYTX W PT W E/M 30 MIN: CPT | Performed by: PSYCHIATRY & NEUROLOGY

## 2022-03-31 PROCEDURE — 1240000000 HC EMOTIONAL WELLNESS R&B

## 2022-03-31 PROCEDURE — 6370000000 HC RX 637 (ALT 250 FOR IP): Performed by: PHYSICIAN ASSISTANT

## 2022-03-31 PROCEDURE — 99232 SBSQ HOSP IP/OBS MODERATE 35: CPT | Performed by: PSYCHIATRY & NEUROLOGY

## 2022-03-31 RX ORDER — VENLAFAXINE HYDROCHLORIDE 75 MG/1
75 CAPSULE, EXTENDED RELEASE ORAL
Status: DISCONTINUED | OUTPATIENT
Start: 2022-04-01 | End: 2022-04-01 | Stop reason: HOSPADM

## 2022-03-31 RX ORDER — VENLAFAXINE HYDROCHLORIDE 75 MG/1
75 CAPSULE, EXTENDED RELEASE ORAL
Qty: 30 CAPSULE | Refills: 0 | Status: CANCELLED | OUTPATIENT
Start: 2022-04-01

## 2022-03-31 RX ADMIN — TRAZODONE HYDROCHLORIDE 50 MG: 50 TABLET ORAL at 21:46

## 2022-03-31 RX ADMIN — HYDROXYZINE HYDROCHLORIDE 50 MG: 25 TABLET, FILM COATED ORAL at 21:46

## 2022-03-31 RX ADMIN — VENLAFAXINE HYDROCHLORIDE 37.5 MG: 37.5 CAPSULE, EXTENDED RELEASE ORAL at 08:33

## 2022-03-31 RX ADMIN — PANTOPRAZOLE SODIUM 40 MG: 40 TABLET, DELAYED RELEASE ORAL at 08:33

## 2022-03-31 RX ADMIN — QUETIAPINE FUMARATE 100 MG: 100 TABLET ORAL at 08:33

## 2022-03-31 RX ADMIN — QUETIAPINE FUMARATE 100 MG: 100 TABLET ORAL at 21:46

## 2022-03-31 ASSESSMENT — PAIN SCALES - GENERAL: PAINLEVEL_OUTOF10: 0

## 2022-03-31 NOTE — PLAN OF CARE
Patient has not attended any of the groups today and has not been out of his room to socialize with others this shift so he has not met his socialization goal. Patient will be encouraged to attend all groups on the unit daily and to come out of his room to socialize with others during the rest of his hospital stay.

## 2022-03-31 NOTE — PLAN OF CARE
Problem: Altered Mood, Depressive Behavior:  Goal: Able to verbalize and/or display a decrease in depressive symptoms  Description: Able to verbalize and/or display a decrease in depressive symptoms  3/31/2022 1401 by Pastor Woodall RN  Outcome: Ongoing  Note: Patient reports continued feelings of depression this shift that are unchanged since admission. 3/31/2022 0211 by Radha Díaz RN  Outcome: Ongoing  Note: Patient reports depressive thoughts, rates mood at a 6. Blunted affect noted. Goal: Ability to disclose and discuss suicidal ideas will improve  Description: Ability to disclose and discuss suicidal ideas will improve  3/31/2022 1401 by Pastor Woodall RN  Outcome: Met This Shift  Note: Patient denies having suicidal ideation this shift. 3/31/2022 0211 by Radha Díaz RN  Outcome: Ongoing  Note: Patient denies suicidal thoughts at this time. Goal: Absence of self-harm  Description: Absence of self-harm  3/31/2022 1401 by Pastor Woodall RN  Outcome: Met This Shift  Note: Patient makes no attempt to harm self to this point in the shift. 3/31/2022 0211 by Radha Díaz RN  Outcome: Ongoing  Note: Patient remains safe and free from harm. Problem: Altered Mood, Psychotic Behavior:  Goal: Able to verbalize reality based thinking  Description: Able to verbalize reality based thinking  3/31/2022 1401 by Pastor Woodall RN  Outcome: Met This Shift  Note: Patient alert and oriented x4  this shift. 3/31/2022 0211 by Radha Díaz RN  Outcome: Ongoing  Note: Patient is oriented, no delusional thinking noted this shift. Goal: Ability to interact with others will improve  Description: Ability to interact with others will improve  3/31/2022 0211 by Radha Díaz RN  Outcome: Completed  Note: Patient appropriate with peers and staff when interacting.    Goal: Compliance with prescribed medication regimen will improve  Description: Compliance with prescribed medication regimen will improve  3/31/2022 1401 by Fab Galeano RN  Outcome: Met This Shift  Note: Patient takes all medications as prescribed when directed this shift. 3/31/2022 0211 by Yoli Knox RN  Outcome: Ongoing  Note: Patient complaint with medications this shift. Goal: Able to verbalize decrease in frequency and intensity of hallucinations  Description: Able to verbalize decrease in frequency and intensity of hallucinations  Outcome: Met This Shift  Note: Patient denies having hallucinations this shift. Problem: Substance Abuse:  Goal: Participates in care planning  Description: Participates in care planning  3/31/2022 1401 by Fab Galeano RN  Outcome: Met This Shift  Note: Patient actively participates in care planning with care team this   3/31/2022 0211 by Yoli Knox RN  Outcome: Not Met This Shift  Note: Patient did not participate this shift. Problem: Discharge Planning:  Goal: Discharged to appropriate level of care  Description: Discharged to appropriate level of care  3/31/2022 1401 by Fab Galeano RN  Outcome: Not Met This Shift  Note: Patient not discharged this shift. Patient continues  to work with care team toward discharge goal.   3/31/2022 0211 by Yoli Konx RN  Outcome: Ongoing  Note: Discharge planning is in progress. Problem: Anxiety:  Goal: Level of anxiety will decrease  Description: Level of anxiety will decrease  3/31/2022 1401 by Fab Galeano RN  Outcome: Ongoing  Note: Patient reports continued feelings of anxiety this shift that are unchanged since admission. 3/31/2022 0211 by Yoli Knox RN  Outcome: Ongoing  Note: Patient reports feeling anxious, redirected well. Problem: Activity:  Goal: Sleeping patterns will improve  Description: Sleeping patterns will improve  3/31/2022 1401 by Fab Galeano RN  Outcome: Met This Shift  Note: Patient's reported sleep is 8.5 continuous.    3/31/2022 0211 by Yoli Knox RN  Outcome: Ongoing  Note: Patient slept 8.5 hours the previous night. Problem: Role Relationship:  Goal: Ability to demonstrate positive changes in social behaviors and relationships will improve  Description: Ability to demonstrate positive changes in social behaviors and relationships will improve  3/31/2022 1401 by Pastor Woodall RN  Outcome: Ongoing  Note: Patient isolates to bed frequently this shift. 3/31/2022 1206 by Kaey Ahmadi  Outcome: Not Met This Shift  3/31/2022 0211 by Radha Díaz RN  Outcome: Ongoing     Problem: GI  Goal: Maintain absence of muscle cramping  Outcome: Met This Shift  Note: Patient denies having muscle cramping this shift. Goal: Absence of nausea  Outcome: Met This Shift  Note: Patient makes no complaints of nausea to writer this shift.

## 2022-03-31 NOTE — PROGRESS NOTES
Department of Psychiatry  Progress Note     Chief Complaint:  Psychosis     SUBJECTIVE:    PROGRESS:  Kaylene Connors is seen laying in bed. He arouses to verbal stimuli and is cooperative with the interview. Kaylene Connors reports he is doing alright today. Rates his mood 5 out of 10 with 10 being the best.  When asked about hallucinations, denies any hallucinations so far today. He is feeling less paranoid that people are trying to harm him outside of the hospital.  He does endorse feeling safe on the unit. He denies any suicidal or homicidal ideation. He reports he is depressed but denies anything specific making him feel depressed. He reports he feels anxious. When asked if anything is making him feel anxious, he says \"the depression and anxiety come together. \" He reports he slept not too bad last night. Staff reported he slept 8.5 hours continuous. He reports his nausea is better today. He says his appetite has been good. He has been hydrating adequately. He has been compliant with his medications. He reports the medications are making him tired. He has been isolative to his room and does not attend groups. He has been talking to his sister on the phone which has been going good.     Suicidal ideations: denies    Compliance with medications: good   Medication side effects: somnolence  ROS: Patient has new complaints: no   Sleep quality: 8.5 hours continuous last night per staff  Attending groups: no      OBJECTIVE      Medications  Current Facility-Administered Medications: [START ON 4/1/2022] venlafaxine (EFFEXOR XR) extended release capsule 75 mg, 75 mg, Oral, Daily with breakfast  ondansetron (ZOFRAN-ODT) disintegrating tablet 4 mg, 4 mg, Oral, Q8H PRN  dicyclomine (BENTYL) capsule 20 mg, 20 mg, Oral, 4x Daily PRN  QUEtiapine (SEROQUEL) tablet 100 mg, 100 mg, Oral, BID  pantoprazole (PROTONIX) tablet 40 mg, 40 mg, Oral, QAM AC  acetaminophen (TYLENOL) tablet 650 mg, 650 mg, Oral, Q4H PRN  magnesium hydroxide (MILK OF MAGNESIA) 400 MG/5ML suspension 30 mL, 30 mL, Oral, Daily PRN  aluminum & magnesium hydroxide-simethicone (MAALOX) 200-200-20 MG/5ML suspension 30 mL, 30 mL, Oral, Q6H PRN  hydrOXYzine (ATARAX) tablet 50 mg, 50 mg, Oral, TID PRN  traZODone (DESYREL) tablet 50 mg, 50 mg, Oral, Nightly PRN     Physical     height is 5' 9\" (1.753 m) and weight is 200 lb (90.7 kg). His oral temperature is 98.6 °F (37 °C). His blood pressure is 105/72 and his pulse is 71. His respiration is 18 and oxygen saturation is 98%.    Lab Results   Component Value Date    WBC 11.4 (H) 03/27/2022    HGB 15.8 03/27/2022    HCT 47.4 03/27/2022     03/27/2022    CHOL 169 09/26/2019    TRIG 84 09/26/2019    HDL 38 (L) 12/03/2021    ALT 16 03/27/2022    AST 26 03/27/2022     03/27/2022    K 3.8 03/27/2022     03/27/2022    CREATININE 1.0 03/27/2022    BUN 12 03/27/2022    CO2 22 (L) 03/27/2022    TSH 1.12 12/03/2021    INR 0.89 10/14/2020          Mental Status Exam:   Level of consciousness:  somnolent   Appearance:  well-appearing, hospital attire, laying in bed, fair grooming and fair hygiene  Behavior/Motor:  No abnormalities noted  Attitude toward examiner:  cooperative, attentive and fair eye contact  Speech:  spontaneous, normal rate and low volume  Mood:  alright per patient   Affect:  Constricted   Thought processes:  linear, goal directed and coherent  Thought content:  Denies homicidal ideation  Suicidal Ideation:  denies suicidal ideation  Delusions:  paranoid but less  Perceptual Disturbance:  denies any current perceptual disturbance  Cognition: Patient is oriented to person, place, time and situation  Concentration: clinically adequate  Memory: intact  Insight & Judgement: fair       ASSESSMENT     Acute psychosis ([facility] [address]   Rule out substance-induced psychosis, schizophrenia, schizoaffective disorder  Methamphetamine use disorder  History of opioid use disorder    PLAN    Patient's symptoms show some improvement today  Medication adjustments as discussed with the attending physician: Will increase Effexor to 75mg daily starting tomorrow morning    Attempt to develop insight, psycho-education and supportive therapy conducted. Probable discharge: Tomorrow   Follow-up: To be determined outpatient, daily while on inpatient unit    Electronically signed by Feng Beach PA-C on 3/31/2022 at 2:38 PM Reviewed patient's current plan of care and vital signs with nursing staff. **This report has been created using voice recognition software. It may contain minor errors which are inherent in voice recognition technology. **                                    Psychiatry Attending Attestation     I assessed this patient and reviewed the case and plan of care with Feng Beach PA-C. I have reviewed the above documentation and I agree with the findings and treatment plan with the following updates. Patient was evaluated by Feng Beach PA-C on the unit in person and I evaluated patient as Tele visit. Patient reports that he continues to feel sad down and low today. Reports having some feelings of helplessness and hopelessness. Notes that his paranoia and suicidal thoughts are somewhat better today. Denies any side effect from the medication. Discussed with him about continue to titrate Effexor and he is agreeable to the plan. PLAN  Patient s symptoms   are improving  Will continue to titrate Effexor  Attempt to develop insight  Psycho-education conducted. Supportive Therapy conducted. Probable discharge is TBD  Follow-up TBD    More than 16 mins of the session was spent doing Supportive psychotherapy and coordinating care. Session lasted for over 30 mins. This Virtual Visit was conducted with patient's consent. The patient is located in a state where I am licensed to provide care.    Clifton Jensen is a 39 y.o. male being evaluated by a Virtual Visit (video visit) encounter to address concerns as mentioned above.  A caregiver was present in the room along with the patient. Patient is present at [address] [place] [address] [address] [facility] and I am physically present at my home [place]     --Gayathri Jean-Baptiste MD on 3/31/2022 at 4:58 PM    An electronic signature was used to authenticate this note. **This report has been created using voice recognition software. It may contain minor errors which are inherent in voice recognition technology. **

## 2022-03-31 NOTE — PLAN OF CARE
Problem: Altered Mood, Depressive Behavior:  Goal: Able to verbalize and/or display a decrease in depressive symptoms  Description: Able to verbalize and/or display a decrease in depressive symptoms  Outcome: Ongoing  Note: Patient reports depressive thoughts, rates mood at a 6. Blunted affect noted. Goal: Ability to disclose and discuss suicidal ideas will improve  Description: Ability to disclose and discuss suicidal ideas will improve  Outcome: Ongoing  Note: Patient denies suicidal thoughts at this time. Goal: Absence of self-harm  Description: Absence of self-harm  Outcome: Ongoing  Note: Patient remains safe and free from harm. Problem: Altered Mood, Psychotic Behavior:  Goal: Able to verbalize reality based thinking  Description: Able to verbalize reality based thinking  Outcome: Ongoing  Note: Patient is oriented, no delusional thinking noted this shift. Goal: Compliance with prescribed medication regimen will improve  Description: Compliance with prescribed medication regimen will improve  Outcome: Ongoing  Note: Patient complaint with medications this shift. Problem: Discharge Planning:  Goal: Discharged to appropriate level of care  Description: Discharged to appropriate level of care  Outcome: Ongoing  Note: Discharge planning is in progress. Problem: Anxiety:  Goal: Level of anxiety will decrease  Description: Level of anxiety will decrease  Outcome: Ongoing  Note: Patient reports feeling anxious, redirected well. Problem: Activity:  Goal: Sleeping patterns will improve  Description: Sleeping patterns will improve  Outcome: Ongoing  Note: Patient slept 8.5 hours the previous night.       Problem: Role Relationship:  Goal: Ability to demonstrate positive changes in social behaviors and relationships will improve  Description: Ability to demonstrate positive changes in social behaviors and relationships will improve  3/31/2022 0211 by Ej Ocasio RN  Outcome: Ongoing  3/30/2022 1217 by Jose Myers  Outcome: Not Met This Shift  Care plan reviewed with patient.   Patient does verbalize understanding of the plan of care and does not contribute to goal setting

## 2022-04-01 VITALS
DIASTOLIC BLOOD PRESSURE: 60 MMHG | SYSTOLIC BLOOD PRESSURE: 117 MMHG | HEIGHT: 69 IN | RESPIRATION RATE: 18 BRPM | HEART RATE: 77 BPM | WEIGHT: 200 LBS | TEMPERATURE: 98.5 F | BODY MASS INDEX: 29.62 KG/M2 | OXYGEN SATURATION: 96 %

## 2022-04-01 PROCEDURE — 5130000000 HC BRIDGE APPOINTMENT

## 2022-04-01 PROCEDURE — 99239 HOSP IP/OBS DSCHRG MGMT >30: CPT | Performed by: PSYCHIATRY & NEUROLOGY

## 2022-04-01 PROCEDURE — 6370000000 HC RX 637 (ALT 250 FOR IP): Performed by: PHYSICIAN ASSISTANT

## 2022-04-01 PROCEDURE — 6370000000 HC RX 637 (ALT 250 FOR IP): Performed by: PSYCHIATRY & NEUROLOGY

## 2022-04-01 RX ORDER — VENLAFAXINE HYDROCHLORIDE 75 MG/1
75 CAPSULE, EXTENDED RELEASE ORAL
Qty: 30 CAPSULE | Refills: 0 | Status: SHIPPED | OUTPATIENT
Start: 2022-04-01

## 2022-04-01 RX ORDER — HYDROXYZINE 50 MG/1
50 TABLET, FILM COATED ORAL 3 TIMES DAILY PRN
Qty: 30 TABLET | Refills: 0 | Status: SHIPPED | OUTPATIENT
Start: 2022-04-01 | End: 2022-04-11

## 2022-04-01 RX ORDER — PANTOPRAZOLE SODIUM 40 MG/1
40 TABLET, DELAYED RELEASE ORAL
Qty: 30 TABLET | Refills: 0 | Status: SHIPPED | OUTPATIENT
Start: 2022-04-01

## 2022-04-01 RX ORDER — TRAZODONE HYDROCHLORIDE 50 MG/1
50 TABLET ORAL NIGHTLY PRN
Qty: 30 TABLET | Refills: 0 | Status: SHIPPED | OUTPATIENT
Start: 2022-04-01

## 2022-04-01 RX ORDER — QUETIAPINE FUMARATE 100 MG/1
100 TABLET, FILM COATED ORAL 2 TIMES DAILY
Qty: 60 TABLET | Refills: 0 | Status: SHIPPED | OUTPATIENT
Start: 2022-04-01

## 2022-04-01 RX ADMIN — QUETIAPINE FUMARATE 100 MG: 100 TABLET ORAL at 08:31

## 2022-04-01 RX ADMIN — PANTOPRAZOLE SODIUM 40 MG: 40 TABLET, DELAYED RELEASE ORAL at 08:31

## 2022-04-01 RX ADMIN — VENLAFAXINE HYDROCHLORIDE 75 MG: 75 CAPSULE, EXTENDED RELEASE ORAL at 08:31

## 2022-04-01 ASSESSMENT — PAIN SCALES - GENERAL: PAINLEVEL_OUTOF10: 0

## 2022-04-01 NOTE — PLAN OF CARE
Problem: Altered Mood, Depressive Behavior:  Goal: Able to verbalize and/or display a decrease in depressive symptoms  Description: Able to verbalize and/or display a decrease in depressive symptoms  4/1/2022 1159 by Moises Rahman RN  Outcome: Completed  4/1/2022 0258 by Jez Turner RN  Outcome: Ongoing  Note: Patient reports \"some depression\" and rates mood at a 7/10. Goal: Ability to disclose and discuss suicidal ideas will improve  Description: Ability to disclose and discuss suicidal ideas will improve  4/1/2022 1159 by Moises Rahman RN  Outcome: Completed  4/1/2022 0258 by Jez Turner RN  Outcome: Ongoing  Note: Patient denies suicidal thoughts. Goal: Absence of self-harm  Description: Absence of self-harm  4/1/2022 1159 by Moises Rahman RN  Outcome: Completed  4/1/2022 0258 by Jez Turner RN  Outcome: Ongoing  Note: Patient remains safe and free from harm. Problem: Altered Mood, Psychotic Behavior:  Goal: Able to verbalize reality based thinking  Description: Able to verbalize reality based thinking  4/1/2022 1159 by Moises Rahman RN  Outcome: Completed  4/1/2022 0258 by Jez Turner RN  Outcome: Ongoing  Note: Patient is oriented, no delusions noted. Goal: Compliance with prescribed medication regimen will improve  Description: Compliance with prescribed medication regimen will improve  4/1/2022 0258 by Jez Turner RN  Outcome: Completed  Note: Patient has been complaint with medications. Goal: Able to verbalize decrease in frequency and intensity of hallucinations  Description: Able to verbalize decrease in frequency and intensity of hallucinations  4/1/2022 1159 by Moises Rahman RN  Outcome: Completed  4/1/2022 0258 by Jez Turner RN  Outcome: Ongoing  Note: Patient denies any hallucinations.      Problem: Substance Abuse:  Goal: Participates in care planning  Description: Participates in care planning  4/1/2022 1159 by Moises Rahman RN  Outcome: Completed  4/1/2022 2465 by Kristina Marie RN  Outcome: Ongoing  Note: Patient did not participate this shift. Problem: Discharge Planning:  Goal: Discharged to appropriate level of care  Description: Discharged to appropriate level of care  4/1/2022 1159 by Stacey Angelo RN  Outcome: Completed  4/1/2022 0258 by Kristina Marie RN  Outcome: Ongoing  Note: Discharge planning is in progress. Problem: Anxiety:  Goal: Level of anxiety will decrease  Description: Level of anxiety will decrease  4/1/2022 1159 by Stacey Angelo RN  Outcome: Completed  4/1/2022 0258 by Kristina Marie RN  Outcome: Ongoing  Note: Patient reports \"some\" anxiety, redirects well. Problem: Activity:  Goal: Sleeping patterns will improve  Description: Sleeping patterns will improve  4/1/2022 1159 by Stacey Angelo RN  Outcome: Completed  4/1/2022 0258 by Kristina Marie RN  Outcome: Ongoing  Note: Patient slept 8.5 hours the previous night. Problem: Role Relationship:  Goal: Ability to demonstrate positive changes in social behaviors and relationships will improve  Description: Ability to demonstrate positive changes in social behaviors and relationships will improve  4/1/2022 1159 by Stacey Angelo RN  Outcome: Completed  4/1/2022 0258 by Kristina Marie RN  Outcome: Ongoing     Problem: GI  Goal: Maintain absence of muscle cramping  4/1/2022 1159 by Stacey Angelo RN  Outcome: Completed  4/1/2022 0258 by Kristina Marie RN  Outcome: Ongoing  Note: Patient currently denies any cramping. Goal: Absence of nausea  4/1/2022 1159 by Stacey Angelo RN  Outcome: Completed  4/1/2022 0258 by Kristina Marie RN  Outcome: Ongoing  Note: Patient denies nausea currently.

## 2022-04-01 NOTE — PLAN OF CARE
care planning  Description: Participates in care planning  4/1/2022 0258 by Emi Keita RN  Outcome: Ongoing  Note: Patient did not participate this shift. 3/31/2022 1401 by Michelle Stinson RN  Outcome: Met This Shift  Note: Patient actively participates in care planning with care team this      Problem: Discharge Planning:  Goal: Discharged to appropriate level of care  Description: Discharged to appropriate level of care  4/1/2022 0258 by Emi Keita RN  Outcome: Ongoing  Note: Discharge planning is in progress. 3/31/2022 1401 by Michelle Stinson RN  Outcome: Not Met This Shift  Note: Patient not discharged this shift. Patient continues  to work with care team toward discharge goal.      Problem: Anxiety:  Goal: Level of anxiety will decrease  Description: Level of anxiety will decrease  4/1/2022 0258 by Emi Keita RN  Outcome: Ongoing  Note: Patient reports \"some\" anxiety, redirects well.   3/31/2022 1401 by Michelle Stinson RN  Outcome: Ongoing  Note: Patient reports continued feelings of anxiety this shift that are unchanged since admission. Problem: Activity:  Goal: Sleeping patterns will improve  Description: Sleeping patterns will improve  4/1/2022 0258 by Emi Keita RN  Outcome: Ongoing  Note: Patient slept 8.5 hours the previous night.  3/31/2022 1401 by Michelle Stinson RN  Outcome: Met This Shift  Note: Patient's reported sleep is 8.5 continuous. Problem: Role Relationship:  Goal: Ability to demonstrate positive changes in social behaviors and relationships will improve  Description: Ability to demonstrate positive changes in social behaviors and relationships will improve  4/1/2022 0258 by Emi Keita RN  Outcome: Ongoing  3/31/2022 1401 by Michelle Stinson RN  Outcome: Ongoing  Note: Patient isolates to bed frequently this shift.       Problem: GI  Goal: Maintain absence of muscle cramping  4/1/2022 0258 by Emi Keita RN  Outcome: Ongoing  Note: Patient currently denies any cramping.  3/31/2022 1401 by Jing Wheeler RN  Outcome: Met This Shift  Note: Patient denies having muscle cramping this shift. Goal: Absence of nausea  4/1/2022 0258 by Adrienne Lind RN  Outcome: Ongoing  Note: Patient denies nausea currently. 3/31/2022 1401 by Jing Wheeler RN  Outcome: Met This Shift  Note: Patient makes no complaints of nausea to writer this shift. Care plan reviewed with patient.   Patient does verbalize understanding of the plan of care and does not contribute to goal setting

## 2022-04-01 NOTE — PROGRESS NOTES
Behavioral Health   Discharge Note    Pt discharged with followings belongings:   Dental Appliances: None  Vision - Corrective Lenses: None  Hearing Aid: None  Jewelry: None  Body Piercings Removed: N/A  Clothing: [place] / coat,Socks  Were All Patient Medications Collected?: Not Applicable  Other Valuables: None   Valuables sent home with patient. Valuables retrieved from safe, Security envelope number:  NA and returned to patient. Patient left department with  via ambulation. Discharged to [address] officer's custody. Frederick Esquivel \"An Important Message from Medicare About Your Rights\" (IMM) form photocopy original from admission and provided to pt at least 4 hours prior to discharge N/A. If pt left within 4 hours of receiving 2nd delivery of IMM, this is because pt was agreeable with hospital discharge. Patient/guardian education on aftercare instructions: Yes  Bridge appointment completed:  yes. Reviewed Discharge Instructions with patient/family/nursing facility. Patient/family verbalizes understanding and agreement with the discharge plan using the teachback method.    Information faxed to NA by [place] Patient/family verbalize understanding of AVS:Yes    Status EXAM upon discharge:  Status and Exam  Normal: No  Facial Expression: Flat  Affect: Blunt  Level of Consciousness: Alert  Mood:Normal: No  Mood: Depressed  Motor Activity:Normal: Yes  Motor Activity: Decreased  Interview Behavior: Cooperative  Preception: [place] to Person,[place] to Time,[place] to Place,[place] to Situation  Attention:Normal: No  Attention: Unable to Concentrate  Thought Processes: Circumstantial  Thought Content:Normal: Yes  Thought Content: Preoccupations  Hallucinations: None (Patient Denies)  Delusions: No  Delusions:  (denies)  Memory:Normal: Yes  Memory: Poor Recent  Insight and Judgment: No  Insight and Judgment: Poor Judgment,Poor Insight,Unmotivated  Present Suicidal Ideation: No (Patient Denies)  Present Homicidal Ideation: No (Patient Denies)    Lu Rater, RN
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
## 2022-04-06 ENCOUNTER — TELEPHONE (OUTPATIENT)
Dept: PSYCHIATRY | Age: 37
End: 2022-04-06

## 2022-06-21 ENCOUNTER — HOSPITAL ENCOUNTER (EMERGENCY)
Age: 37
Discharge: HOME | End: 2022-06-21
Payer: COMMERCIAL

## 2022-06-21 ENCOUNTER — APPOINTMENT (OUTPATIENT)
Dept: GENERAL RADIOLOGY | Age: 37
End: 2022-06-21
Payer: COMMERCIAL

## 2022-06-21 VITALS
DIASTOLIC BLOOD PRESSURE: 88 MMHG | OXYGEN SATURATION: 99 % | RESPIRATION RATE: 15 BRPM | WEIGHT: 210 LBS | SYSTOLIC BLOOD PRESSURE: 142 MMHG | HEART RATE: 90 BPM | TEMPERATURE: 98.5 F | BODY MASS INDEX: 31.1 KG/M2 | HEIGHT: 69 IN

## 2022-06-21 DIAGNOSIS — S92.515A NONDISPLACED FRACTURE OF PROXIMAL PHALANX OF LEFT LESSER TOE(S), INITIAL ENCOUNTER FOR CLOSED FRACTURE: Primary | ICD-10-CM

## 2022-06-21 PROCEDURE — 6370000000 HC RX 637 (ALT 250 FOR IP): Performed by: NURSE PRACTITIONER

## 2022-06-21 PROCEDURE — 99283 EMERGENCY DEPT VISIT LOW MDM: CPT

## 2022-06-21 PROCEDURE — 73630 X-RAY EXAM OF FOOT: CPT

## 2022-06-21 RX ORDER — TRAMADOL HYDROCHLORIDE 50 MG/1
50 TABLET ORAL ONCE
Status: COMPLETED | OUTPATIENT
Start: 2022-06-21 | End: 2022-06-21

## 2022-06-21 RX ADMIN — TRAMADOL HYDROCHLORIDE 50 MG: 50 TABLET, COATED ORAL at 18:54

## 2022-06-21 ASSESSMENT — ENCOUNTER SYMPTOMS
EYE PAIN: 0
RHINORRHEA: 0
VOMITING: 0
NAUSEA: 0
DIARRHEA: 0
BLOOD IN STOOL: 0
CONSTIPATION: 0
SINUS PRESSURE: 0
WHEEZING: 0
ABDOMINAL PAIN: 0
SHORTNESS OF BREATH: 0
COUGH: 0

## 2022-06-21 ASSESSMENT — PAIN SCALES - GENERAL: PAINLEVEL_OUTOF10: 4

## 2022-06-21 ASSESSMENT — PAIN - FUNCTIONAL ASSESSMENT: PAIN_FUNCTIONAL_ASSESSMENT: 0-10

## 2022-06-21 ASSESSMENT — PAIN DESCRIPTION - LOCATION: LOCATION: FOOT

## 2022-06-21 NOTE — ED PROVIDER NOTES
[address] [facility] [address] [address] EMERGENCY DEPT  EMERGENCY MEDICINE     Pt Name: Preet Alvarado  MRN: [id number]  [place] 1985  Date of evaluation: 6/21/2022  PCP:    Estevan Weiss MD  Provider: CHRISTINE Turner - CNP    CHIEF COMPLAINT       Chief Complaint   Patient presents with    Foot Pain     L           HISTORY OF PRESENT ILLNESS    Preet Alvarado is a 39 y.o. male patient that presents to ER with left foot pain that happened yesterday when he \"rolled his foot\". Patient denies other injuries or other symptoms including chest pain or shortness of breath. Patient does have soft tissue swelling noted to the dorsal aspect of the lateral left foot. Patient has associated tenderness in that area as well. Patient dorsalis pedis pulse is 2+, patient is able to wiggle his toes and sensation is intact. Triage notes and Nursing notes were reviewed by myself. Any discrepancies are addressed above.     PAST MEDICAL HISTORY     Past Medical History:   Diagnosis Date    Hep C w/ coma, chronic        SURGICAL HISTORY       Past Surgical History:   Procedure Laterality Date    ARM SURGERY Left 10/26/2020    EXCISION LEFT LOWER FOREARM LIPOMA performed by Abilio Mart MD at [address] [place] [address] 1/5/2022    EXCISION LIPOMA LOWER BACK performed by [facility] at [facility]  2009    SHOULDER CLOSED REDUCTION         CURRENT MEDICATIONS       Previous Medications    PANTOPRAZOLE (PROTONIX) 40 MG TABLET    Take 1 tablet by mouth every morning (before breakfast)    QUETIAPINE (SEROQUEL) 100 MG TABLET    Take 1 tablet by mouth 2 times daily    TRAZODONE (DESYREL) 50 MG TABLET    Take 1 tablet by mouth nightly as needed for Sleep    VENLAFAXINE (EFFEXOR XR) 75 MG EXTENDED RELEASE CAPSULE    Take 1 capsule by mouth daily (with breakfast)       ALLERGIES       Allergies   Allergen Reactions    Asa [Aspirin] Shortness Of Breath    Ibuprofen Shortness Of Breath       FAMILY HISTORY       Family History   Problem Relation Age of Onset    No Known Problems Mother     Alcohol Abuse Father     Liver Disease Father         SOCIAL HISTORY       Social History     Socioeconomic History    Marital status: Single     Spouse name: Not on file    Number of children: Not on file    Years of education: Not on file    Highest education level: Not on file   Occupational History     Employer: [employer]    Tobacco Use    Smoking status: Current Every Day Smoker     Packs/day: 2.00     Types: Cigarettes    Smokeless tobacco: Current User     Types: Chew   Vaping Use    Vaping Use: Never used   Substance and Sexual Activity    Alcohol use: No    Drug use: Yes     Types: Methamphetamines (Crystal Meth)     Comment: did not elaborate    Sexual activity: Yes     Partners: Female   Other Topics Concern    Not on file   Social History Narrative    Not on file     Social Determinants of Health     Financial Resource Strain:     Difficulty of Paying Living Expenses: Not on file   Food Insecurity:     Worried About Running Out of Food in the Last Year: Not on file    Krupa of Food in the Last Year: Not on file   Transportation Needs:     Lack of Transportation (Medical): Not on file    Lack of Transportation (Non-Medical):  Not on file   Physical Activity:     Days of Exercise per Week: Not on file    Minutes of Exercise per Session: Not on file   Stress:     Feeling of Stress : Not on file   Social Connections:     Frequency of Communication with Friends and Family: Not on file    Frequency of Social Gatherings with Friends and Family: Not on file    Attends Mandaen Services: Not on file    Active Member of Clubs or Organizations: Not on file    Attends Club or Organization Meetings: Not on file    Marital Status: Not on file   Intimate Partner Violence:     Fear of Current or Ex-Partner: Not on file    Emotionally Abused: Not on file    Physically Abused: Not on file  Sexually Abused: Not on file   Housing Stability:     Unable to Pay for Housing in the Last Year: Not on file    Number of Places Lived in the Last Year: Not on file    Unstable Housing in the Last Year: Not on file       REVIEW OF SYSTEMS     Review of Systems   Constitutional: Negative for appetite change, chills, fatigue, fever and unexpected weight change. HENT: Negative for ear pain, rhinorrhea and sinus pressure. Eyes: Negative for pain and visual disturbance. Respiratory: Negative for cough, shortness of breath and wheezing. Cardiovascular: Negative for chest pain, palpitations and leg swelling. Gastrointestinal: Negative for abdominal pain, blood in stool, constipation, diarrhea, nausea and vomiting. Genitourinary: Negative for dysuria, frequency and hematuria. Musculoskeletal: Positive for arthralgias (left foot pain). Negative for joint swelling. Skin: Negative for rash. Neurological: Negative for dizziness, syncope, weakness, light-headedness and headaches. Hematological: Does not bruise/bleed easily. Except as noted above the remainder of the review of systems was reviewed and is negative. SCREENINGS        Poneto Coma Scale  Eye Opening: Spontaneous  Best Verbal Response: Oriented  Best Motor Response: Obeys commands  Poneto Coma Scale Score: 15               PHYSICAL EXAM    (up to 7 for level 4, 8 or more for level 5)     ED Triage Vitals [02/19/22 1512]   BP Temp Temp Source Pulse Resp SpO2 Height Weight   (!) 134/95 98.2 °F (36.8 °C) Oral 124 16 100 % -- --       Physical Exam  Vitals and nursing note reviewed. Constitutional:       Appearance: He is not diaphoretic. HENT:      Head: Normocephalic and atraumatic. Right Ear: External ear normal.      Left Ear: External ear normal.   Eyes:      Conjunctiva/sclera: Conjunctivae normal.   Cardiovascular:      Pulses:           Dorsalis pedis pulses are 2+ on the right side and 2+ on the left side. Pulmonary:      Effort: Pulmonary effort is normal. No respiratory distress. Abdominal:      General: There is no distension. Musculoskeletal:      Cervical back: Normal range of motion. Right foot: Decreased range of motion. Feet:    Skin:     General: Skin is warm and dry. Neurological:      Mental Status: He is alert. DIAGNOSTIC RESULTS     EKG:(none if blank)  All EKGs are interpreted by the Emergency Department Physician who either signs or Co-signs this chart in the absence of a cardiologist.        RADIOLOGY: (none if blank)   I directly visualized the following images and reviewed the radiologist interpretations. Interpretation per the Radiologist below, if available at the time of this note:  XR FOOT LEFT (MIN 3 VIEWS)   Final Result   1. Oblique minimally displaced intra-articular fracture of the base of the    5th proximal phalanx. This document has been electronically signed by: Keyur Ledesma MD on    06/21/2022 07:19 PM          LABS:  Labs Reviewed - No data to display    All other labs were within normal range or not returned as of this dictation. Please note, any cultures that may have been sent were not resulted at the time of this patient visit. EMERGENCY DEPARTMENT COURSE and Medical Decision Making:     Vitals:    Vitals:    06/21/22 1751   BP: (!) 142/88   Pulse: 90   Resp: 15   Temp: 98.5 °F (36.9 °C)   TempSrc: Oral   SpO2: 99%   Weight: 210 lb (95.3 kg)   Height: 5' 9\" (1.753 m)       PROCEDURES: (None if blank)  Procedures       MDM    Patient that presents to ER with left foot pain that happened yesterday when he \"rolled his foot\". Differential diagnosis includes but not limited to contusion of the foot, metatarsal fracture or sprain of the foot. X-ray reveals a phalanx fracture. Patient be discharged in a walking boot and instructed to follow-up with orthopedic institute.     Patient instructed to return to ER for worsening symptoms, numbness or tingling in the extremities. Follow-up with your primary care provider or orthopedic institute at the walk-in clinic. The walk-in clinic is open from 8-4 Monday through Thursday and from 8-3 on Friday. May apply ice or heat as needed for up to 15 minutes at a time for comfort. Strict return precautions and follow up instructions were discussed with the patient with which the patient agrees    ED Medications administered this visit:    Medications   traMADol (ULTRAM) tablet 50 mg (50 mg Oral Given 6/21/22 4985)         FINAL IMPRESSION      1.  Nondisplaced fracture of proximal phalanx of left lesser toe(s), initial encounter for closed fracture          DISPOSITION/PLAN   DISPOSITION Discharge - Pending Orders Complete 06/21/2022 07:26:54 PM      PATIENT REFERRED TO:  [facility]. [id number]  [phone]  Go to         [facility]te:  New Prescriptions    No medications on file              CHRISTINE Boyd CNP (electronically signed)           CHRISTINE Boyd CNP  06/21/22 [address] [facility] [address] [facility], APRN - CNP  06/21/22 2010

## 2022-06-21 NOTE — Clinical Note
Rhea Trevino was seen and treated in our emergency department on 6/21/2022. He may return to work on 06/23/2022. If you have any questions or concerns, please don't hesitate to call.       Jacinda Cates, APRN - CNP

## 2022-06-21 NOTE — Clinical Note
Adair Turner was seen and treated in our emergency department on 6/21/2022. He may return to work on 06/23/2022. If you have any questions or concerns, please don't hesitate to call.       Dayanara Moore, CHRISTINE - CNP

## 2023-05-15 ENCOUNTER — HOSPITAL ENCOUNTER (OUTPATIENT)
Age: 38
Setting detail: SPECIMEN
Discharge: HOME | End: 2023-05-15

## 2023-05-16 LAB
25(OH)D3 SERPL-MCNC: 17.4 NG/ML
ALBUMIN SERPL-MCNC: 4.3 G/DL (ref 3.5–5.2)
ALBUMIN/GLOB SERPL: 1.5 {RATIO} (ref 1–2.5)
ALP SERPL-CCNC: 55 U/L (ref 40–129)
ALT SERPL-CCNC: 18 U/L (ref 5–41)
ANION GAP SERPL CALCULATED.3IONS-SCNC: 13 MMOL/L (ref 9–17)
AST SERPL-CCNC: 19 U/L
BASOPHILS # BLD: 0.07 K/UL (ref 0–0.2)
BASOPHILS # BLD: 1 % (ref 0–2)
BILIRUB SERPL-MCNC: 0.3 MG/DL (ref 0.3–1.2)
BUN SERPL-MCNC: 11 MG/DL (ref 6–20)
CALCIUM SERPL-MCNC: 9.8 MG/DL (ref 8.6–10.4)
CHLORIDE SERPL-SCNC: 103 MMOL/L (ref 98–107)
CHOLEST SERPL-MCNC: 302 MG/DL
CHOLESTEROL/HDL RATIO: 6.9
CO2 SERPL-SCNC: 23 MMOL/L (ref 20–31)
CREAT SERPL-MCNC: 0.95 MG/DL (ref 0.7–1.2)
EOSINOPHIL # BLD: 0.32 K/UL (ref 0–0.44)
EOSINOPHILS RELATIVE PERCENT: 4 % (ref 1–4)
ERYTHROCYTE [DISTWIDTH] IN BLOOD BY AUTOMATED COUNT: 14.1 % (ref 11.8–14.4)
FOLATE SERPL-MCNC: 15.6 NG/ML
GFR SERPL CREATININE-BSD FRML MDRD: >60 ML/MIN/1.73M2
GLUCOSE SERPL-MCNC: 79 MG/DL (ref 70–99)
HCT VFR BLD AUTO: 48.2 % (ref 40.7–50.3)
HCV RNA SERPL QL NAA+PROBE: NOT DETECTED
HDLC SERPL-MCNC: 44 MG/DL
HGB BLD-MCNC: 15.5 G/DL (ref 13–17)
IMM GRANULOCYTES # BLD AUTO: 0.05 K/UL (ref 0–0.3)
IMM GRANULOCYTES NFR BLD: 1 %
LDLC SERPL CALC-MCNC: 215 MG/DL (ref 0–130)
LYMPHOCYTES # BLD: 33 % (ref 24–43)
LYMPHOCYTES NFR BLD: 2.46 K/UL (ref 1.1–3.7)
MCH RBC QN AUTO: 29.9 PG (ref 25.2–33.5)
MCHC RBC AUTO-ENTMCNC: 32.2 G/DL (ref 28.4–34.8)
MCV RBC AUTO: 92.9 FL (ref 82.6–102.9)
MONOCYTES NFR BLD: 0.38 K/UL (ref 0.1–1.2)
MONOCYTES NFR BLD: 5 % (ref 3–12)
NEUTROPHILS NFR BLD: 56 % (ref 36–65)
NEUTS SEG NFR BLD: 4.08 K/UL (ref 1.5–8.1)
NRBC AUTOMATED: 0 PER 100 WBC
PLATELET # BLD AUTO: 463 K/UL (ref 138–453)
PMV BLD AUTO: 10 FL (ref 8.1–13.5)
POTASSIUM SERPL-SCNC: 4.8 MMOL/L (ref 3.7–5.3)
PROT SERPL-MCNC: 7.2 G/DL (ref 6.4–8.3)
RBC # BLD AUTO: 5.19 M/UL (ref 4.21–5.77)
SODIUM SERPL-SCNC: 139 MMOL/L (ref 135–144)
SPECIMEN SOURCE: NORMAL
TRIGL SERPL-MCNC: 214 MG/DL
TSH SERPL-ACNC: 0.81 UIU/ML (ref 0.3–5)
VIT B12 SERPL-MCNC: 310 PG/ML (ref 232–1245)
WBC OTHER # BLD: 7.4 K/UL (ref 3.5–11.3)

## 2023-07-04 ENCOUNTER — HOSPITAL ENCOUNTER (EMERGENCY)
Age: 38
Discharge: HOME | End: 2023-07-04
Payer: MEDICAID

## 2023-07-04 VITALS
DIASTOLIC BLOOD PRESSURE: 93 MMHG | HEART RATE: 92 BPM | TEMPERATURE: 98 F | SYSTOLIC BLOOD PRESSURE: 142 MMHG | RESPIRATION RATE: 17 BRPM | WEIGHT: 210 LBS | OXYGEN SATURATION: 100 % | BODY MASS INDEX: 31.01 KG/M2

## 2023-07-04 DIAGNOSIS — E86.0 DEHYDRATION: ICD-10-CM

## 2023-07-04 DIAGNOSIS — F15.10 METHAMPHETAMINE ABUSE (HCC): Primary | ICD-10-CM

## 2023-07-04 LAB
ALBUMIN SERPL BCG-MCNC: 4.7 G/DL (ref 3.5–5.1)
ALP SERPL-CCNC: 69 U/L (ref 38–126)
ALT SERPL W/O P-5'-P-CCNC: 20 U/L (ref 11–66)
AMPHETAMINES UR QL SCN: POSITIVE
ANION GAP SERPL CALC-SCNC: 24 MEQ/L (ref 8–16)
APAP SERPL-MCNC: < 5 UG/ML (ref 0–20)
AST SERPL-CCNC: 32 U/L (ref 5–40)
BACTERIA URNS QL MICRO: ABNORMAL /HPF
BARBITURATES UR QL SCN: NEGATIVE
BASOPHILS ABSOLUTE: 0 THOU/MM3 (ref 0–0.1)
BASOPHILS NFR BLD AUTO: 0.4 %
BENZODIAZ UR QL SCN: NEGATIVE
BILIRUB CONJ SERPL-MCNC: < 0.2 MG/DL (ref 0–0.3)
BILIRUB SERPL-MCNC: 0.8 MG/DL (ref 0.3–1.2)
BILIRUB UR QL STRIP.AUTO: NEGATIVE
BUN SERPL-MCNC: 19 MG/DL (ref 7–22)
BZE UR QL SCN: NEGATIVE
CALCIUM SERPL-MCNC: 9.9 MG/DL (ref 8.5–10.5)
CANNABINOIDS UR QL SCN: POSITIVE
CASTS #/AREA URNS LPF: ABNORMAL /LPF
CASTS 2: ABNORMAL /LPF
CHARACTER UR: CLEAR
CHLORIDE SERPL-SCNC: 95 MEQ/L (ref 98–111)
CO2 SERPL-SCNC: 17 MEQ/L (ref 23–33)
COLOR: ABNORMAL
CREAT SERPL-MCNC: 1.2 MG/DL (ref 0.4–1.2)
CRYSTALS URNS MICRO: ABNORMAL
DEPRECATED RDW RBC AUTO: 45.1 FL (ref 35–45)
EKG ATRIAL RATE: 101 BPM
EKG P AXIS: 45 DEGREES
EKG P-R INTERVAL: 130 MS
EKG Q-T INTERVAL: 326 MS
EKG QRS DURATION: 88 MS
EKG QTC CALCULATION (BAZETT): 422 MS
EKG R AXIS: -19 DEGREES
EKG T AXIS: 44 DEGREES
EKG VENTRICULAR RATE: 101 BPM
EOSINOPHIL NFR BLD AUTO: 0.5 %
EOSINOPHILS ABSOLUTE: 0 THOU/MM3 (ref 0–0.4)
EPITHELIAL CELLS, UA: ABNORMAL /HPF
ERYTHROCYTE [DISTWIDTH] IN BLOOD BY AUTOMATED COUNT: 13.7 % (ref 11.5–14.5)
ETHANOL SERPL-MCNC: < 0.01 %
FENTANYL: NEGATIVE
GFR SERPL CREATININE-BSD FRML MDRD: > 60 ML/MIN/1.73M2
GLUCOSE SERPL-MCNC: 91 MG/DL (ref 70–108)
GLUCOSE UR QL STRIP.AUTO: NEGATIVE MG/DL
HCT VFR BLD AUTO: 48.1 % (ref 42–52)
HGB BLD-MCNC: 15.8 GM/DL (ref 14–18)
HGB UR QL STRIP.AUTO: NEGATIVE
IMM GRANULOCYTES # BLD AUTO: 0.05 THOU/MM3 (ref 0–0.07)
IMM GRANULOCYTES NFR BLD AUTO: 0.5 %
KETONES UR QL STRIP.AUTO: >= 160
LYMPHOCYTES ABSOLUTE: 2.2 THOU/MM3 (ref 1–4.8)
LYMPHOCYTES NFR BLD AUTO: 23 %
MCH RBC QN AUTO: 29.8 PG (ref 26–33)
MCHC RBC AUTO-ENTMCNC: 32.8 GM/DL (ref 32.2–35.5)
MCV RBC AUTO: 90.6 FL (ref 80–94)
MISCELLANEOUS 2: ABNORMAL
MONOCYTES ABSOLUTE: 1.1 THOU/MM3 (ref 0.4–1.3)
MONOCYTES NFR BLD AUTO: 11 %
NEUTROPHILS NFR BLD AUTO: 64.6 %
NITRITE UR QL STRIP: NEGATIVE
NRBC BLD AUTO-RTO: 0 /100 WBC
OPIATES UR QL SCN: NEGATIVE
OSMOLALITY SERPL CALC.SUM OF ELEC: 273.8 MOSMOL/KG (ref 275–300)
OXYCODONE: NEGATIVE
PCP UR QL SCN: NEGATIVE
PH UR STRIP.AUTO: 5.5 [PH] (ref 5–9)
PLATELET # BLD AUTO: 435 THOU/MM3 (ref 130–400)
PMV BLD AUTO: 9.7 FL (ref 9.4–12.4)
POTASSIUM SERPL-SCNC: 3.6 MEQ/L (ref 3.5–5.2)
PROT SERPL-MCNC: 8.3 G/DL (ref 6.1–8)
PROT UR STRIP.AUTO-MCNC: 30 MG/DL
RBC # BLD AUTO: 5.31 MILL/MM3 (ref 4.7–6.1)
RBC URINE: ABNORMAL /HPF
RENAL EPI CELLS #/AREA URNS HPF: ABNORMAL /[HPF]
SALICYLATES SERPL-MCNC: < 0.3 MG/DL (ref 2–10)
SEGMENTED NEUTROPHILS ABSOLUTE COUNT: 6.3 THOU/MM3 (ref 1.8–7.7)
SODIUM SERPL-SCNC: 136 MEQ/L (ref 135–145)
SP GR UR REFRACT.AUTO: > 1.03 (ref 1–1.03)
TROPONIN T: < 0.01 NG/ML
TSH SERPL DL<=0.005 MIU/L-ACNC: 2.31 UIU/ML (ref 0.4–4.2)
UROBILINOGEN, URINE: 1 EU/DL (ref 0–1)
WBC # BLD AUTO: 9.7 THOU/MM3 (ref 4.8–10.8)
WBC #/AREA URNS HPF: ABNORMAL /HPF
WBC #/AREA URNS HPF: NEGATIVE /[HPF]
YEAST LIKE FUNGI URNS QL MICRO: ABNORMAL

## 2023-07-04 PROCEDURE — 82248 BILIRUBIN DIRECT: CPT

## 2023-07-04 PROCEDURE — 85025 COMPLETE CBC W/AUTO DIFF WBC: CPT

## 2023-07-04 PROCEDURE — 2580000003 HC RX 258: Performed by: NURSE PRACTITIONER

## 2023-07-04 PROCEDURE — 93010 ELECTROCARDIOGRAM REPORT: CPT | Performed by: INTERNAL MEDICINE

## 2023-07-04 PROCEDURE — 81001 URINALYSIS AUTO W/SCOPE: CPT

## 2023-07-04 PROCEDURE — 99284 EMERGENCY DEPT VISIT MOD MDM: CPT

## 2023-07-04 PROCEDURE — 80143 DRUG ASSAY ACETAMINOPHEN: CPT

## 2023-07-04 PROCEDURE — 82077 ASSAY SPEC XCP UR&BREATH IA: CPT

## 2023-07-04 PROCEDURE — 6360000002 HC RX W HCPCS: Performed by: NURSE PRACTITIONER

## 2023-07-04 PROCEDURE — 96361 HYDRATE IV INFUSION ADD-ON: CPT

## 2023-07-04 PROCEDURE — 80307 DRUG TEST PRSMV CHEM ANLYZR: CPT

## 2023-07-04 PROCEDURE — 36415 COLL VENOUS BLD VENIPUNCTURE: CPT

## 2023-07-04 PROCEDURE — 96374 THER/PROPH/DIAG INJ IV PUSH: CPT

## 2023-07-04 PROCEDURE — 93005 ELECTROCARDIOGRAM TRACING: CPT | Performed by: NURSE PRACTITIONER

## 2023-07-04 PROCEDURE — 80053 COMPREHEN METABOLIC PANEL: CPT

## 2023-07-04 PROCEDURE — 80179 DRUG ASSAY SALICYLATE: CPT

## 2023-07-04 PROCEDURE — 84443 ASSAY THYROID STIM HORMONE: CPT

## 2023-07-04 PROCEDURE — 84484 ASSAY OF TROPONIN QUANT: CPT

## 2023-07-04 RX ORDER — 0.9 % SODIUM CHLORIDE 0.9 %
1000 INTRAVENOUS SOLUTION INTRAVENOUS ONCE
Status: COMPLETED | OUTPATIENT
Start: 2023-07-04 | End: 2023-07-04

## 2023-07-04 RX ORDER — LORAZEPAM 2 MG/ML
1 INJECTION INTRAMUSCULAR ONCE
Status: COMPLETED | OUTPATIENT
Start: 2023-07-04 | End: 2023-07-04

## 2023-07-04 RX ADMIN — LORAZEPAM 1 MG: 2 INJECTION INTRAMUSCULAR; INTRAVENOUS at 10:33

## 2023-07-04 RX ADMIN — SODIUM CHLORIDE 1000 ML: 9 INJECTION, SOLUTION INTRAVENOUS at 10:35

## 2023-07-04 RX ADMIN — SODIUM CHLORIDE 1000 ML: 9 INJECTION, SOLUTION INTRAVENOUS at 11:31

## 2023-07-04 ASSESSMENT — PAIN DESCRIPTION - PAIN TYPE: TYPE: ACUTE PAIN

## 2023-07-04 ASSESSMENT — PAIN DESCRIPTION - DESCRIPTORS: DESCRIPTORS: ACHING;TIGHTNESS

## 2023-07-04 ASSESSMENT — PAIN DESCRIPTION - LOCATION: LOCATION: CHEST

## 2023-07-04 ASSESSMENT — PAIN - FUNCTIONAL ASSESSMENT
PAIN_FUNCTIONAL_ASSESSMENT: 0-10
PAIN_FUNCTIONAL_ASSESSMENT: NONE - DENIES PAIN
PAIN_FUNCTIONAL_ASSESSMENT: NONE - DENIES PAIN

## 2023-07-04 ASSESSMENT — PAIN SCALES - GENERAL: PAINLEVEL_OUTOF10: 9

## 2023-07-04 ASSESSMENT — PAIN DESCRIPTION - ONSET: ONSET: ON-GOING

## 2023-07-04 ASSESSMENT — PAIN DESCRIPTION - FREQUENCY: FREQUENCY: CONTINUOUS

## 2023-07-04 NOTE — PROGRESS NOTES
10:31am-  Spoke with medical provider, CHANDNI Edmond, who is requesting patient get resources for AoD. Spoke with patient and his family regarding outpatient AoD treatment options. Gave resource packet to patient's family.

## 2023-07-04 NOTE — ED NOTES
Patient to the ED with complaints of substance abuse problem. Patient states he regularly uses methamphetamines. He states last use was this morning. Family at bedside state that patient is seeking help to get off meth. Patient is diaphoretic and appears anxious. Call light in reach. Awaiting providers orders.       Cherylene Mayhew, RN  07/04/23 4388

## 2023-07-06 ENCOUNTER — HOSPITAL ENCOUNTER (EMERGENCY)
Age: 38
Discharge: HOME | End: 2023-07-07
Attending: EMERGENCY MEDICINE
Payer: MEDICAID

## 2023-07-06 VITALS
SYSTOLIC BLOOD PRESSURE: 156 MMHG | RESPIRATION RATE: 18 BRPM | WEIGHT: 225 LBS | HEART RATE: 100 BPM | BODY MASS INDEX: 33.33 KG/M2 | OXYGEN SATURATION: 98 % | TEMPERATURE: 98.9 F | DIASTOLIC BLOOD PRESSURE: 89 MMHG | HEIGHT: 69 IN

## 2023-07-06 DIAGNOSIS — F15.10 METHAMPHETAMINE ABUSE (HCC): Primary | ICD-10-CM

## 2023-07-06 LAB
ALBUMIN SERPL BCG-MCNC: 4.5 G/DL (ref 3.5–5.1)
ALP SERPL-CCNC: 65 U/L (ref 38–126)
ALT SERPL W/O P-5'-P-CCNC: 23 U/L (ref 11–66)
ANION GAP SERPL CALC-SCNC: 20 MEQ/L (ref 8–16)
AST SERPL-CCNC: 23 U/L (ref 5–40)
BASOPHILS ABSOLUTE: 0 THOU/MM3 (ref 0–0.1)
BASOPHILS NFR BLD AUTO: 0.2 %
BILIRUB SERPL-MCNC: 0.3 MG/DL (ref 0.3–1.2)
BUN SERPL-MCNC: 11 MG/DL (ref 7–22)
CALCIUM SERPL-MCNC: 10 MG/DL (ref 8.5–10.5)
CHLORIDE SERPL-SCNC: 102 MEQ/L (ref 98–111)
CO2 SERPL-SCNC: 21 MEQ/L (ref 23–33)
CREAT SERPL-MCNC: 1 MG/DL (ref 0.4–1.2)
DEPRECATED RDW RBC AUTO: 44.5 FL (ref 35–45)
EOSINOPHIL NFR BLD AUTO: 0 %
EOSINOPHILS ABSOLUTE: 0 THOU/MM3 (ref 0–0.4)
ERYTHROCYTE [DISTWIDTH] IN BLOOD BY AUTOMATED COUNT: 13.5 % (ref 11.5–14.5)
ETHANOL SERPL-MCNC: < 0.01 %
GFR SERPL CREATININE-BSD FRML MDRD: > 60 ML/MIN/1.73M2
GLUCOSE SERPL-MCNC: 103 MG/DL (ref 70–108)
HCT VFR BLD AUTO: 45.7 % (ref 42–52)
HGB BLD-MCNC: 15.3 GM/DL (ref 14–18)
IMM GRANULOCYTES # BLD AUTO: 0.06 THOU/MM3 (ref 0–0.07)
IMM GRANULOCYTES NFR BLD AUTO: 0.5 %
LYMPHOCYTES ABSOLUTE: 1 THOU/MM3 (ref 1–4.8)
LYMPHOCYTES NFR BLD AUTO: 7.6 %
MAGNESIUM SERPL-MCNC: 1.5 MG/DL (ref 1.6–2.4)
MCH RBC QN AUTO: 30 PG (ref 26–33)
MCHC RBC AUTO-ENTMCNC: 33.5 GM/DL (ref 32.2–35.5)
MCV RBC AUTO: 89.6 FL (ref 80–94)
MONOCYTES ABSOLUTE: 0.6 THOU/MM3 (ref 0.4–1.3)
MONOCYTES NFR BLD AUTO: 4.5 %
NEUTROPHILS NFR BLD AUTO: 87.2 %
NRBC BLD AUTO-RTO: 0 /100 WBC
OSMOLALITY SERPL CALC.SUM OF ELEC: 284.6 MOSMOL/KG (ref 275–300)
PLATELET # BLD AUTO: 456 THOU/MM3 (ref 130–400)
PMV BLD AUTO: 9.6 FL (ref 9.4–12.4)
POTASSIUM SERPL-SCNC: 3.6 MEQ/L (ref 3.5–5.2)
PROT SERPL-MCNC: 7.7 G/DL (ref 6.1–8)
RBC # BLD AUTO: 5.1 MILL/MM3 (ref 4.7–6.1)
SEGMENTED NEUTROPHILS ABSOLUTE COUNT: 11.3 THOU/MM3 (ref 1.8–7.7)
SODIUM SERPL-SCNC: 143 MEQ/L (ref 135–145)
TROPONIN T: < 0.01 NG/ML
WBC # BLD AUTO: 13 THOU/MM3 (ref 4.8–10.8)

## 2023-07-06 PROCEDURE — 36415 COLL VENOUS BLD VENIPUNCTURE: CPT

## 2023-07-06 PROCEDURE — 85025 COMPLETE CBC W/AUTO DIFF WBC: CPT

## 2023-07-06 PROCEDURE — 83735 ASSAY OF MAGNESIUM: CPT

## 2023-07-06 PROCEDURE — 80053 COMPREHEN METABOLIC PANEL: CPT

## 2023-07-06 PROCEDURE — 99284 EMERGENCY DEPT VISIT MOD MDM: CPT

## 2023-07-06 PROCEDURE — 93005 ELECTROCARDIOGRAM TRACING: CPT | Performed by: EMERGENCY MEDICINE

## 2023-07-06 PROCEDURE — 84484 ASSAY OF TROPONIN QUANT: CPT

## 2023-07-06 PROCEDURE — 2580000003 HC RX 258: Performed by: EMERGENCY MEDICINE

## 2023-07-06 PROCEDURE — 82077 ASSAY SPEC XCP UR&BREATH IA: CPT

## 2023-07-06 PROCEDURE — 96374 THER/PROPH/DIAG INJ IV PUSH: CPT

## 2023-07-06 PROCEDURE — 82550 ASSAY OF CK (CPK): CPT

## 2023-07-06 RX ORDER — 0.9 % SODIUM CHLORIDE 0.9 %
1000 INTRAVENOUS SOLUTION INTRAVENOUS ONCE
Status: COMPLETED | OUTPATIENT
Start: 2023-07-06 | End: 2023-07-07

## 2023-07-06 RX ORDER — MAGNESIUM SULFATE 1 G/100ML
1000 INJECTION INTRAVENOUS ONCE
Status: COMPLETED | OUTPATIENT
Start: 2023-07-07 | End: 2023-07-07

## 2023-07-06 RX ADMIN — SODIUM CHLORIDE 1000 ML: 9 INJECTION, SOLUTION INTRAVENOUS at 22:59

## 2023-07-06 ASSESSMENT — PAIN - FUNCTIONAL ASSESSMENT: PAIN_FUNCTIONAL_ASSESSMENT: 0-10

## 2023-07-06 ASSESSMENT — PAIN SCALES - GENERAL: PAINLEVEL_OUTOF10: 9

## 2023-07-06 ASSESSMENT — PAIN DESCRIPTION - LOCATION: LOCATION: CHEST

## 2023-07-07 LAB — CK SERPL-CCNC: 204 U/L (ref 55–170)

## 2023-07-07 PROCEDURE — 6360000002 HC RX W HCPCS: Performed by: EMERGENCY MEDICINE

## 2023-07-07 PROCEDURE — 93010 ELECTROCARDIOGRAM REPORT: CPT | Performed by: INTERNAL MEDICINE

## 2023-07-07 RX ADMIN — MAGNESIUM SULFATE HEPTAHYDRATE 1000 MG: 1 INJECTION, SOLUTION INTRAVENOUS at 00:29

## 2023-07-07 NOTE — ED PROVIDER NOTES
[facility] EMERGENCY DEPT      EMERGENCY MEDICINE     Pt Name: Cricket Dewitt  MRN: [id number]  [address] [facility] 1985  Date of evaluation: 7/6/2023  Provider: Morgan Amanda MD    CHIEF COMPLAINT       Chief Complaint   Patient presents with    Addiction Problem     HISTORY OF PRESENT ILLNESS   Cricket Dewitt is a pleasant 44 y.o. male who presents to the emergency department from from home, as a walk in to the ED lobby for evaluation of methamphetamine use. Patient states he has used methamphetamine several times today. He injects his methamphetamine. He developed chest pressure after using meth. His family member that is with him states that he admitted to doing [place] lot more meth that I did the other day \". Patient denies any nausea, vomiting, or difficulty breathing. He does endorse that he has been outside a significant portion of today. A family member states that when he was here on 4 July they gave him IV fluids and Ativan. She states the sister is requesting that we do the same as that helped calm him down. Patient denies any feelings of agitation or aggression. He just complains of some chest pressure.     PASTMEDICAL HISTORY     Past Medical History:   Diagnosis Date    Hep C w/ coma, chronic        Patient Active Problem List   Diagnosis Code    Lipoma of back D17.1    Acute psychosis ([address]) F23     SURGICAL HISTORY       Past Surgical History:   Procedure Laterality Date    ARM SURGERY Left 10/26/2020    EXCISION LEFT LOWER FOREARM LIPOMA performed by Radha Mar MD at [facility] 1/5/2022    EXCISION LIPOMA LOWER BACK performed by Radha Mar MD at [address] [place] [address]  2009    [address] [place] [address]       Discharge Medication List as of 7/7/2023  1:25 AM        CONTINUE these medications which have NOT CHANGED    Details   traZODone (DESYREL) 50 MG tablet Take 1 tablet by mouth nightly as needed for Sleep, Disp-30

## 2023-07-07 NOTE — DISCHARGE INSTRUCTIONS
You were seen for anxiety and chest pain related to methamphetamine use. No evidence of ischemia or heart problems were found on evaluation today. You were mildly dehydrated and this was replenished with IV fluids. Your magnesium was mildly low and we also replaced this in the emergency department. Symptoms of using methamphetamines include elevated heart rate, anxiety, jittery feelings, and sometimes paranoia. The symptoms will go away as the drug leaves your system. Please follow-up with Daljit[facility] if you are interested in discontinuing the use of methamphetamine.

## 2023-07-07 NOTE — ED NOTES
Dr Kirk Salmon bedside. Pt willingly emptied pockets to show he had no other drugs on his person at this time, per request of bedside friend.       Rigo Malloy  07/06/23 4975

## 2023-07-07 NOTE — ED TRIAGE NOTES
Pt to ED via private with c/o meth use. Pt states he did more meth this time than normal and is feeling anxious. Pt is known IV drug user. Pt alert and oriented at this time. Pt seen 2 days ago at this facility for the same thing. Pt friend bedside, requesting fluids and ativan. Pt states CP, EKG complete.  VSS

## 2023-07-09 LAB
EKG ATRIAL RATE: 110 BPM
EKG P AXIS: 36 DEGREES
EKG P-R INTERVAL: 126 MS
EKG Q-T INTERVAL: 316 MS
EKG QRS DURATION: 88 MS
EKG QTC CALCULATION (BAZETT): 427 MS
EKG R AXIS: -18 DEGREES
EKG T AXIS: 37 DEGREES
EKG VENTRICULAR RATE: 110 BPM

## 2023-10-11 ENCOUNTER — APPOINTMENT (OUTPATIENT)
Dept: GENERAL RADIOLOGY | Age: 38
End: 2023-10-11
Payer: MEDICAID

## 2023-10-11 ENCOUNTER — HOSPITAL ENCOUNTER (EMERGENCY)
Age: 38
Discharge: LEFT BEFORE DISCHARGE | End: 2023-10-11
Attending: EMERGENCY MEDICINE
Payer: MEDICAID

## 2023-10-11 VITALS
SYSTOLIC BLOOD PRESSURE: 117 MMHG | TEMPERATURE: 97.6 F | BODY MASS INDEX: 29.62 KG/M2 | RESPIRATION RATE: 15 BRPM | OXYGEN SATURATION: 95 % | HEIGHT: 69 IN | DIASTOLIC BLOOD PRESSURE: 68 MMHG | HEART RATE: 89 BPM | WEIGHT: 200 LBS

## 2023-10-11 DIAGNOSIS — F41.1 ANXIETY STATE: ICD-10-CM

## 2023-10-11 DIAGNOSIS — F15.10 METHAMPHETAMINE ABUSE (HCC): Primary | ICD-10-CM

## 2023-10-11 LAB
ALBUMIN SERPL BCG-MCNC: 4.5 G/DL (ref 3.5–5.1)
ALP SERPL-CCNC: 60 U/L (ref 38–126)
ALT SERPL W/O P-5'-P-CCNC: 26 U/L (ref 11–66)
AMPHETAMINES UR QL SCN: NORMAL
ANION GAP SERPL CALC-SCNC: 14 MEQ/L (ref 8–16)
AST SERPL-CCNC: 82 U/L (ref 5–40)
BARBITURATES UR QL SCN: NEGATIVE
BASOPHILS ABSOLUTE: 0 THOU/MM3 (ref 0–0.1)
BASOPHILS NFR BLD AUTO: 0.6 %
BENZODIAZ UR QL SCN: NORMAL
BILIRUB SERPL-MCNC: 0.4 MG/DL (ref 0.3–1.2)
BUN SERPL-MCNC: 26 MG/DL (ref 7–22)
BZE UR QL SCN: NEGATIVE
CALCIUM SERPL-MCNC: 9.9 MG/DL (ref 8.5–10.5)
CANNABINOIDS UR QL SCN: NORMAL
CHLORIDE SERPL-SCNC: 102 MEQ/L (ref 98–111)
CO2 SERPL-SCNC: 24 MEQ/L (ref 23–33)
CREAT SERPL-MCNC: 1.4 MG/DL (ref 0.4–1.2)
DEPRECATED RDW RBC AUTO: 48.6 FL (ref 35–45)
EOSINOPHIL NFR BLD AUTO: 3.3 %
EOSINOPHILS ABSOLUTE: 0.3 THOU/MM3 (ref 0–0.4)
ERYTHROCYTE [DISTWIDTH] IN BLOOD BY AUTOMATED COUNT: 14.7 % (ref 11.5–14.5)
FENTANYL: NEGATIVE
GFR SERPL CREATININE-BSD FRML MDRD: > 60 ML/MIN/1.73M2
GLUCOSE SERPL-MCNC: 93 MG/DL (ref 70–108)
HCT VFR BLD AUTO: 43.6 % (ref 42–52)
HGB BLD-MCNC: 14.2 GM/DL (ref 14–18)
IMM GRANULOCYTES # BLD AUTO: 0.03 THOU/MM3 (ref 0–0.07)
IMM GRANULOCYTES NFR BLD AUTO: 0.4 %
LYMPHOCYTES ABSOLUTE: 3 THOU/MM3 (ref 1–4.8)
LYMPHOCYTES NFR BLD AUTO: 36.7 %
MCH RBC QN AUTO: 29.5 PG (ref 26–33)
MCHC RBC AUTO-ENTMCNC: 32.6 GM/DL (ref 32.2–35.5)
MCV RBC AUTO: 90.5 FL (ref 80–94)
MONOCYTES ABSOLUTE: 0.7 THOU/MM3 (ref 0.4–1.3)
MONOCYTES NFR BLD AUTO: 8.4 %
NEUTROPHILS NFR BLD AUTO: 50.6 %
NRBC BLD AUTO-RTO: 0 /100 WBC
NT-PROBNP SERPL IA-MCNC: 165.4 PG/ML (ref 0–124)
OPIATES UR QL SCN: NEGATIVE
OSMOLALITY SERPL CALC.SUM OF ELEC: 283.9 MOSMOL/KG (ref 275–300)
OXYCODONE: NEGATIVE
PHENCYCLIDINE QUANTITATIVE URINE: NEGATIVE
PLATELET # BLD AUTO: 471 THOU/MM3 (ref 130–400)
PMV BLD AUTO: 9.4 FL (ref 9.4–12.4)
POTASSIUM SERPL-SCNC: 4.3 MEQ/L (ref 3.5–5.2)
PROT SERPL-MCNC: 7.5 G/DL (ref 6.1–8)
RBC # BLD AUTO: 4.82 MILL/MM3 (ref 4.7–6.1)
SEGMENTED NEUTROPHILS ABSOLUTE COUNT: 4.2 THOU/MM3 (ref 1.8–7.7)
SODIUM SERPL-SCNC: 140 MEQ/L (ref 135–145)
TROPONIN, HIGH SENSITIVITY: 17 NG/L (ref 0–12)
WBC # BLD AUTO: 8.3 THOU/MM3 (ref 4.8–10.8)

## 2023-10-11 PROCEDURE — 85025 COMPLETE CBC W/AUTO DIFF WBC: CPT

## 2023-10-11 PROCEDURE — G0480 DRUG TEST DEF 1-7 CLASSES: HCPCS

## 2023-10-11 PROCEDURE — 6360000002 HC RX W HCPCS: Performed by: EMERGENCY MEDICINE

## 2023-10-11 PROCEDURE — 93005 ELECTROCARDIOGRAM TRACING: CPT | Performed by: EMERGENCY MEDICINE

## 2023-10-11 PROCEDURE — 83880 ASSAY OF NATRIURETIC PEPTIDE: CPT

## 2023-10-11 PROCEDURE — 84484 ASSAY OF TROPONIN QUANT: CPT

## 2023-10-11 PROCEDURE — 80053 COMPREHEN METABOLIC PANEL: CPT

## 2023-10-11 PROCEDURE — 80305 DRUG TEST PRSMV DIR OPT OBS: CPT

## 2023-10-11 PROCEDURE — 36415 COLL VENOUS BLD VENIPUNCTURE: CPT

## 2023-10-11 PROCEDURE — 96372 THER/PROPH/DIAG INJ SC/IM: CPT

## 2023-10-11 PROCEDURE — 93010 ELECTROCARDIOGRAM REPORT: CPT | Performed by: INTERNAL MEDICINE

## 2023-10-11 PROCEDURE — 71045 X-RAY EXAM CHEST 1 VIEW: CPT

## 2023-10-11 PROCEDURE — 99285 EMERGENCY DEPT VISIT HI MDM: CPT

## 2023-10-11 RX ORDER — DIPHENHYDRAMINE HYDROCHLORIDE 50 MG/ML
25 INJECTION INTRAMUSCULAR; INTRAVENOUS ONCE
Status: COMPLETED | OUTPATIENT
Start: 2023-10-11 | End: 2023-10-11

## 2023-10-11 RX ADMIN — DIPHENHYDRAMINE HYDROCHLORIDE 25 MG: 50 INJECTION INTRAMUSCULAR; INTRAVENOUS at 08:01

## 2023-10-11 ASSESSMENT — PAIN - FUNCTIONAL ASSESSMENT: PAIN_FUNCTIONAL_ASSESSMENT: NONE - DENIES PAIN

## 2023-10-11 NOTE — ED TRIAGE NOTES
Pt presents to the ED via the front lobby with complaints of wanting to detox from IV methamphetamine. Pt states he was clean for a long time but has been using the past couple days. Pt states that he wants to get off of it. Last use was about 1600 yesterday and that he only uses once a day. Pt states he is hallucinating by seeing and hearing things. Pt states he is also increasingly paranoid.

## 2023-10-11 NOTE — ED PROVIDER NOTES
sensory deficit. Motor: No weakness. Psychiatric:         Behavior: Behavior normal.         Thought Content: Thought content normal.         Judgment: Judgment normal.       FORMAL DIAGNOSTIC RESULTS     RADIOLOGY: Interpretation per the Radiologist below, if available at the time of this note (none if blank):  XR CHEST PORTABLE   Final Result   No acute cardiopulmonary process. **This report has been created using voice recognition software. It may contain minor errors which are inherent in voice recognition technology. **      Final report electronically signed by Dr. Alena Dior on 10/11/2023 7:30 AM        EKG: (Interpreted by me)  Normal sinus rhythm, sinus arrhythmia, ventricular rate of 79 bpm, NJ interval 132 ms, QRS duration 90 ms,  ms, normal ECG    LABS: (None if blank)  Results for orders placed or performed during the hospital encounter of 10/11/23   CBC with Auto Differential   Result Value Ref Range    WBC 8.3 4.8 - 10.8 thou/mm3    RBC 4.82 4.70 - 6.10 mill/mm3    Hemoglobin 14.2 14.0 - 18.0 gm/dl    Hematocrit 43.6 42.0 - 52.0 %    MCV 90.5 80.0 - 94.0 fL    MCH 29.5 26.0 - 33.0 pg    MCHC 32.6 32.2 - 35.5 gm/dl    RDW-CV 14.7 (H) 11.5 - 14.5 %    RDW-SD 48.6 (H) 35.0 - 45.0 fL    Platelets 720 (H) 302 - 400 thou/mm3    MPV 9.4 9.4 - 12.4 fL    Seg Neutrophils 50.6 %    Lymphocytes 36.7 %    Monocytes 8.4 %    Eosinophils 3.3 %    Basophils 0.6 %    Immature Granulocytes 0.4 %    Segs Absolute 4.2 1.8 - 7.7 thou/mm3    Lymphocytes Absolute 3.0 1.0 - 4.8 thou/mm3    Monocytes Absolute 0.7 0.4 - 1.3 thou/mm3    Eosinophils Absolute 0.3 0.0 - 0.4 thou/mm3    Basophils Absolute 0.0 0.0 - 0.1 thou/mm3    Immature Grans (Abs) 0.03 0.00 - 0.07 thou/mm3    nRBC 0 /100 wbc   Comprehensive Metabolic Panel   Result Value Ref Range    Glucose 93 70 - 108 mg/dL    Creatinine 1.4 (H) 0.4 - 1.2 mg/dL    BUN 26 (H) 7 - 22 mg/dL    Sodium 140 135 - 145 meq/L    Potassium 4.3 3.5 - 5.2

## 2023-10-12 LAB
EKG ATRIAL RATE: 79 BPM
EKG P AXIS: 56 DEGREES
EKG P-R INTERVAL: 132 MS
EKG Q-T INTERVAL: 376 MS
EKG QRS DURATION: 90 MS
EKG QTC CALCULATION (BAZETT): 431 MS
EKG R AXIS: -16 DEGREES
EKG T AXIS: 18 DEGREES
EKG VENTRICULAR RATE: 79 BPM

## 2024-02-21 ENCOUNTER — APPOINTMENT (OUTPATIENT)
Dept: GENERAL RADIOLOGY | Age: 39
End: 2024-02-21
Payer: COMMERCIAL

## 2024-02-21 ENCOUNTER — HOSPITAL ENCOUNTER (INPATIENT)
Age: 39
LOS: 3 days | Discharge: PSYCHIATRIC OR SUBSTANCE USE FACILITY | End: 2024-02-24
Attending: EMERGENCY MEDICINE | Admitting: INTERNAL MEDICINE
Payer: COMMERCIAL

## 2024-02-21 DIAGNOSIS — T42.4X1A XANAX OVERDOSE: ICD-10-CM

## 2024-02-21 DIAGNOSIS — N17.9 AKI (ACUTE KIDNEY INJURY) (HCC): Primary | ICD-10-CM

## 2024-02-21 DIAGNOSIS — R45.851 SUICIDAL IDEATION: ICD-10-CM

## 2024-02-21 DIAGNOSIS — F11.10 OPIOID ABUSE (HCC): ICD-10-CM

## 2024-02-21 DIAGNOSIS — F15.10 METHAMPHETAMINE ABUSE (HCC): ICD-10-CM

## 2024-02-21 PROBLEM — T50.902A DRUG OVERDOSE, INTENTIONAL SELF-HARM, INITIAL ENCOUNTER (HCC): Status: ACTIVE | Noted: 2024-02-21

## 2024-02-21 LAB
ALBUMIN SERPL BCG-MCNC: 4.7 G/DL (ref 3.5–5.1)
ALP SERPL-CCNC: 65 U/L (ref 38–126)
ALT SERPL W/O P-5'-P-CCNC: 35 U/L (ref 11–66)
AMPHETAMINES UR QL SCN: POSITIVE
ANION GAP SERPL CALC-SCNC: 22 MEQ/L (ref 8–16)
APAP SERPL-MCNC: < 5 UG/ML (ref 0–20)
AST SERPL-CCNC: 97 U/L (ref 5–40)
BACTERIA URNS QL MICRO: ABNORMAL /HPF
BARBITURATES UR QL SCN: NEGATIVE
BASOPHILS ABSOLUTE: 0.1 THOU/MM3 (ref 0–0.1)
BASOPHILS NFR BLD AUTO: 0.4 %
BENZODIAZ UR QL SCN: POSITIVE
BILIRUB CONJ SERPL-MCNC: < 0.2 MG/DL (ref 0–0.3)
BILIRUB SERPL-MCNC: 0.6 MG/DL (ref 0.3–1.2)
BILIRUB UR QL STRIP.AUTO: NEGATIVE
BUN SERPL-MCNC: 34 MG/DL (ref 7–22)
BZE UR QL SCN: NEGATIVE
CALCIUM SERPL-MCNC: 10.1 MG/DL (ref 8.5–10.5)
CANNABINOIDS UR QL SCN: POSITIVE
CASTS #/AREA URNS LPF: ABNORMAL /LPF
CASTS 2: ABNORMAL /LPF
CHARACTER UR: CLEAR
CHLORIDE SERPL-SCNC: 92 MEQ/L (ref 98–111)
CO2 SERPL-SCNC: 18 MEQ/L (ref 23–33)
COLOR: YELLOW
CREAT SERPL-MCNC: 2.9 MG/DL (ref 0.4–1.2)
CRYSTALS URNS MICRO: ABNORMAL
DEPRECATED RDW RBC AUTO: 48.5 FL (ref 35–45)
EOSINOPHIL NFR BLD AUTO: 0.1 %
EOSINOPHILS ABSOLUTE: 0 THOU/MM3 (ref 0–0.4)
EPITHELIAL CELLS, UA: ABNORMAL /HPF
ERYTHROCYTE [DISTWIDTH] IN BLOOD BY AUTOMATED COUNT: 15.5 % (ref 11.5–14.5)
ETHANOL SERPL-MCNC: < 0.01 %
FENTANYL: NEGATIVE
GFR SERPL CREATININE-BSD FRML MDRD: 27 ML/MIN/1.73M2
GLUCOSE SERPL-MCNC: 90 MG/DL (ref 70–108)
GLUCOSE UR QL STRIP.AUTO: NEGATIVE MG/DL
HCT VFR BLD AUTO: 42.8 % (ref 42–52)
HGB BLD-MCNC: 14.1 GM/DL (ref 14–18)
HGB UR QL STRIP.AUTO: NEGATIVE
IMM GRANULOCYTES # BLD AUTO: 0.12 THOU/MM3 (ref 0–0.07)
IMM GRANULOCYTES NFR BLD AUTO: 0.9 %
KETONES UR QL STRIP.AUTO: 15
LACTATE SERPL-SCNC: 0.9 MMOL/L (ref 0.5–2)
LYMPHOCYTES ABSOLUTE: 1.5 THOU/MM3 (ref 1–4.8)
LYMPHOCYTES NFR BLD AUTO: 11.8 %
MAGNESIUM SERPL-MCNC: 2.5 MG/DL (ref 1.6–2.4)
MCH RBC QN AUTO: 28.3 PG (ref 26–33)
MCHC RBC AUTO-ENTMCNC: 32.9 GM/DL (ref 32.2–35.5)
MCV RBC AUTO: 85.9 FL (ref 80–94)
MISCELLANEOUS 2: ABNORMAL
MONOCYTES ABSOLUTE: 0.7 THOU/MM3 (ref 0.4–1.3)
MONOCYTES NFR BLD AUTO: 5.5 %
NEUTROPHILS NFR BLD AUTO: 81.3 %
NITRITE UR QL STRIP: NEGATIVE
NRBC BLD AUTO-RTO: 0 /100 WBC
NT-PROBNP SERPL IA-MCNC: 245.4 PG/ML (ref 0–124)
OPIATES UR QL SCN: NEGATIVE
OSMOLALITY SERPL CALC.SUM OF ELEC: 271.7 MOSMOL/KG (ref 275–300)
OXYCODONE: NEGATIVE
PCP UR QL SCN: NEGATIVE
PH UR STRIP.AUTO: 5.5 [PH] (ref 5–9)
PLATELET # BLD AUTO: 448 THOU/MM3 (ref 130–400)
PMV BLD AUTO: 10 FL (ref 9.4–12.4)
POTASSIUM SERPL-SCNC: 4.4 MEQ/L (ref 3.5–5.2)
PROT SERPL-MCNC: 8.2 G/DL (ref 6.1–8)
PROT UR STRIP.AUTO-MCNC: 30 MG/DL
RBC # BLD AUTO: 4.98 MILL/MM3 (ref 4.7–6.1)
RBC URINE: ABNORMAL /HPF
RENAL EPI CELLS #/AREA URNS HPF: ABNORMAL /[HPF]
SALICYLATES SERPL-MCNC: < 0.3 MG/DL (ref 2–10)
SEGMENTED NEUTROPHILS ABSOLUTE COUNT: 10.3 THOU/MM3 (ref 1.8–7.7)
SODIUM SERPL-SCNC: 132 MEQ/L (ref 135–145)
SP GR UR REFRACT.AUTO: 1.02 (ref 1–1.03)
TROPONIN, HIGH SENSITIVITY: 15 NG/L (ref 0–12)
TROPONIN, HIGH SENSITIVITY: 22 NG/L (ref 0–12)
UROBILINOGEN, URINE: 0.2 EU/DL (ref 0–1)
WBC # BLD AUTO: 12.7 THOU/MM3 (ref 4.8–10.8)
WBC #/AREA URNS HPF: ABNORMAL /HPF
WBC #/AREA URNS HPF: NEGATIVE /[HPF]
YEAST LIKE FUNGI URNS QL MICRO: ABNORMAL

## 2024-02-21 PROCEDURE — 6360000002 HC RX W HCPCS: Performed by: EMERGENCY MEDICINE

## 2024-02-21 PROCEDURE — 82077 ASSAY SPEC XCP UR&BREATH IA: CPT

## 2024-02-21 PROCEDURE — 96374 THER/PROPH/DIAG INJ IV PUSH: CPT

## 2024-02-21 PROCEDURE — 81001 URINALYSIS AUTO W/SCOPE: CPT

## 2024-02-21 PROCEDURE — 84484 ASSAY OF TROPONIN QUANT: CPT

## 2024-02-21 PROCEDURE — 94761 N-INVAS EAR/PLS OXIMETRY MLT: CPT

## 2024-02-21 PROCEDURE — 93005 ELECTROCARDIOGRAM TRACING: CPT | Performed by: PHYSICIAN ASSISTANT

## 2024-02-21 PROCEDURE — 80307 DRUG TEST PRSMV CHEM ANLYZR: CPT

## 2024-02-21 PROCEDURE — 2700000000 HC OXYGEN THERAPY PER DAY

## 2024-02-21 PROCEDURE — 83735 ASSAY OF MAGNESIUM: CPT

## 2024-02-21 PROCEDURE — 80179 DRUG ASSAY SALICYLATE: CPT

## 2024-02-21 PROCEDURE — 6360000002 HC RX W HCPCS

## 2024-02-21 PROCEDURE — 94002 VENT MGMT INPAT INIT DAY: CPT

## 2024-02-21 PROCEDURE — 93005 ELECTROCARDIOGRAM TRACING: CPT | Performed by: EMERGENCY MEDICINE

## 2024-02-21 PROCEDURE — 5A1935Z RESPIRATORY VENTILATION, LESS THAN 24 CONSECUTIVE HOURS: ICD-10-PCS | Performed by: INTERNAL MEDICINE

## 2024-02-21 PROCEDURE — 36415 COLL VENOUS BLD VENIPUNCTURE: CPT

## 2024-02-21 PROCEDURE — 71045 X-RAY EXAM CHEST 1 VIEW: CPT

## 2024-02-21 PROCEDURE — 80048 BASIC METABOLIC PNL TOTAL CA: CPT

## 2024-02-21 PROCEDURE — 80076 HEPATIC FUNCTION PANEL: CPT

## 2024-02-21 PROCEDURE — 2100000000 HC CCU R&B

## 2024-02-21 PROCEDURE — 2580000003 HC RX 258

## 2024-02-21 PROCEDURE — 99285 EMERGENCY DEPT VISIT HI MDM: CPT

## 2024-02-21 PROCEDURE — 83605 ASSAY OF LACTIC ACID: CPT

## 2024-02-21 PROCEDURE — 0BH17EZ INSERTION OF ENDOTRACHEAL AIRWAY INTO TRACHEA, VIA NATURAL OR ARTIFICIAL OPENING: ICD-10-PCS | Performed by: INTERNAL MEDICINE

## 2024-02-21 PROCEDURE — 93010 ELECTROCARDIOGRAM REPORT: CPT | Performed by: INTERNAL MEDICINE

## 2024-02-21 PROCEDURE — 2500000003 HC RX 250 WO HCPCS: Performed by: EMERGENCY MEDICINE

## 2024-02-21 PROCEDURE — 83880 ASSAY OF NATRIURETIC PEPTIDE: CPT

## 2024-02-21 PROCEDURE — 2580000003 HC RX 258: Performed by: STUDENT IN AN ORGANIZED HEALTH CARE EDUCATION/TRAINING PROGRAM

## 2024-02-21 PROCEDURE — 80143 DRUG ASSAY ACETAMINOPHEN: CPT

## 2024-02-21 PROCEDURE — 31500 INSERT EMERGENCY AIRWAY: CPT

## 2024-02-21 PROCEDURE — 85025 COMPLETE CBC W/AUTO DIFF WBC: CPT

## 2024-02-21 RX ORDER — ONDANSETRON 2 MG/ML
4 INJECTION INTRAMUSCULAR; INTRAVENOUS ONCE
Status: COMPLETED | OUTPATIENT
Start: 2024-02-21 | End: 2024-02-21

## 2024-02-21 RX ORDER — TRAZODONE HYDROCHLORIDE 50 MG/1
50 TABLET ORAL NIGHTLY PRN
Status: DISCONTINUED | OUTPATIENT
Start: 2024-02-21 | End: 2024-02-24 | Stop reason: HOSPADM

## 2024-02-21 RX ORDER — SODIUM CHLORIDE 0.9 % (FLUSH) 0.9 %
5-40 SYRINGE (ML) INJECTION PRN
Status: DISCONTINUED | OUTPATIENT
Start: 2024-02-21 | End: 2024-02-24 | Stop reason: HOSPADM

## 2024-02-21 RX ORDER — MIDAZOLAM HYDROCHLORIDE 1 MG/ML
4 INJECTION INTRAMUSCULAR; INTRAVENOUS ONCE
Status: COMPLETED | OUTPATIENT
Start: 2024-02-21 | End: 2024-02-21

## 2024-02-21 RX ORDER — ONDANSETRON 2 MG/ML
4 INJECTION INTRAMUSCULAR; INTRAVENOUS EVERY 6 HOURS PRN
Status: DISCONTINUED | OUTPATIENT
Start: 2024-02-21 | End: 2024-02-24 | Stop reason: HOSPADM

## 2024-02-21 RX ORDER — ENOXAPARIN SODIUM 100 MG/ML
40 INJECTION SUBCUTANEOUS EVERY 24 HOURS
Status: DISCONTINUED | OUTPATIENT
Start: 2024-02-22 | End: 2024-02-24 | Stop reason: HOSPADM

## 2024-02-21 RX ORDER — MIDAZOLAM HYDROCHLORIDE 1 MG/ML
1-10 INJECTION, SOLUTION INTRAVENOUS CONTINUOUS
Status: DISCONTINUED | OUTPATIENT
Start: 2024-02-21 | End: 2024-02-22

## 2024-02-21 RX ORDER — SODIUM CHLORIDE 9 MG/ML
INJECTION, SOLUTION INTRAVENOUS PRN
Status: DISCONTINUED | OUTPATIENT
Start: 2024-02-21 | End: 2024-02-24 | Stop reason: HOSPADM

## 2024-02-21 RX ORDER — MIDAZOLAM HYDROCHLORIDE 1 MG/ML
INJECTION INTRAMUSCULAR; INTRAVENOUS
Status: COMPLETED
Start: 2024-02-21 | End: 2024-02-21

## 2024-02-21 RX ORDER — ONDANSETRON 2 MG/ML
INJECTION INTRAMUSCULAR; INTRAVENOUS
Status: COMPLETED
Start: 2024-02-21 | End: 2024-02-21

## 2024-02-21 RX ORDER — VENLAFAXINE HYDROCHLORIDE 150 MG/1
150 CAPSULE, EXTENDED RELEASE ORAL
Status: DISCONTINUED | OUTPATIENT
Start: 2024-02-22 | End: 2024-02-24 | Stop reason: HOSPADM

## 2024-02-21 RX ORDER — FENTANYL CITRATE 50 UG/ML
50 INJECTION, SOLUTION INTRAMUSCULAR; INTRAVENOUS ONCE
Status: COMPLETED | OUTPATIENT
Start: 2024-02-21 | End: 2024-02-21

## 2024-02-21 RX ORDER — ONDANSETRON 4 MG/1
4 TABLET, ORALLY DISINTEGRATING ORAL EVERY 8 HOURS PRN
Status: DISCONTINUED | OUTPATIENT
Start: 2024-02-21 | End: 2024-02-24 | Stop reason: HOSPADM

## 2024-02-21 RX ORDER — POTASSIUM CHLORIDE 20 MEQ/1
40 TABLET, EXTENDED RELEASE ORAL PRN
Status: DISCONTINUED | OUTPATIENT
Start: 2024-02-21 | End: 2024-02-24 | Stop reason: HOSPADM

## 2024-02-21 RX ORDER — SUCCINYLCHOLINE CHLORIDE 20 MG/ML
100 INJECTION INTRAMUSCULAR; INTRAVENOUS ONCE
Status: COMPLETED | OUTPATIENT
Start: 2024-02-21 | End: 2024-02-21

## 2024-02-21 RX ORDER — SODIUM CHLORIDE 0.9 % (FLUSH) 0.9 %
5-40 SYRINGE (ML) INJECTION EVERY 12 HOURS SCHEDULED
Status: DISCONTINUED | OUTPATIENT
Start: 2024-02-21 | End: 2024-02-24 | Stop reason: HOSPADM

## 2024-02-21 RX ORDER — PROPOFOL 10 MG/ML
INJECTION, EMULSION INTRAVENOUS
Status: DISPENSED
Start: 2024-02-21 | End: 2024-02-22

## 2024-02-21 RX ORDER — ACETAMINOPHEN 325 MG/1
650 TABLET ORAL EVERY 6 HOURS PRN
Status: DISCONTINUED | OUTPATIENT
Start: 2024-02-21 | End: 2024-02-24 | Stop reason: HOSPADM

## 2024-02-21 RX ORDER — ACETAMINOPHEN 650 MG/1
650 SUPPOSITORY RECTAL EVERY 6 HOURS PRN
Status: DISCONTINUED | OUTPATIENT
Start: 2024-02-21 | End: 2024-02-24 | Stop reason: HOSPADM

## 2024-02-21 RX ORDER — POLYETHYLENE GLYCOL 3350 17 G/17G
17 POWDER, FOR SOLUTION ORAL DAILY PRN
Status: DISCONTINUED | OUTPATIENT
Start: 2024-02-21 | End: 2024-02-24 | Stop reason: HOSPADM

## 2024-02-21 RX ORDER — IBUPROFEN 600 MG/1
1 TABLET ORAL PRN
Status: DISCONTINUED | OUTPATIENT
Start: 2024-02-21 | End: 2024-02-24 | Stop reason: HOSPADM

## 2024-02-21 RX ORDER — DEXTROSE MONOHYDRATE 100 MG/ML
INJECTION, SOLUTION INTRAVENOUS CONTINUOUS PRN
Status: DISCONTINUED | OUTPATIENT
Start: 2024-02-21 | End: 2024-02-24 | Stop reason: HOSPADM

## 2024-02-21 RX ORDER — CALCIUM GLUCONATE 20 MG/ML
2000 INJECTION, SOLUTION INTRAVENOUS PRN
Status: DISCONTINUED | OUTPATIENT
Start: 2024-02-21 | End: 2024-02-24 | Stop reason: HOSPADM

## 2024-02-21 RX ORDER — PANTOPRAZOLE SODIUM 40 MG/1
40 TABLET, DELAYED RELEASE ORAL
Status: DISCONTINUED | OUTPATIENT
Start: 2024-02-22 | End: 2024-02-22

## 2024-02-21 RX ORDER — FENTANYL CITRATE-0.9 % NACL/PF 10 MCG/ML
25-200 PLASTIC BAG, INJECTION (ML) INTRAVENOUS CONTINUOUS
Status: DISCONTINUED | OUTPATIENT
Start: 2024-02-21 | End: 2024-02-22

## 2024-02-21 RX ORDER — SODIUM CHLORIDE 9 MG/ML
INJECTION, SOLUTION INTRAVENOUS CONTINUOUS
Status: DISCONTINUED | OUTPATIENT
Start: 2024-02-21 | End: 2024-02-22

## 2024-02-21 RX ORDER — 0.9 % SODIUM CHLORIDE 0.9 %
1000 INTRAVENOUS SOLUTION INTRAVENOUS ONCE
Status: COMPLETED | OUTPATIENT
Start: 2024-02-21 | End: 2024-02-21

## 2024-02-21 RX ORDER — ETOMIDATE 2 MG/ML
20 INJECTION INTRAVENOUS ONCE
Status: COMPLETED | OUTPATIENT
Start: 2024-02-21 | End: 2024-02-21

## 2024-02-21 RX ORDER — VECURONIUM BROMIDE 1 MG/ML
10 INJECTION, POWDER, LYOPHILIZED, FOR SOLUTION INTRAVENOUS ONCE
Status: DISCONTINUED | OUTPATIENT
Start: 2024-02-21 | End: 2024-02-23

## 2024-02-21 RX ORDER — FENTANYL CITRATE 50 UG/ML
INJECTION, SOLUTION INTRAMUSCULAR; INTRAVENOUS
Status: COMPLETED
Start: 2024-02-21 | End: 2024-02-21

## 2024-02-21 RX ORDER — QUETIAPINE FUMARATE 100 MG/1
100 TABLET, FILM COATED ORAL 2 TIMES DAILY
Status: DISCONTINUED | OUTPATIENT
Start: 2024-02-21 | End: 2024-02-22

## 2024-02-21 RX ORDER — POTASSIUM CHLORIDE 7.45 MG/ML
10 INJECTION INTRAVENOUS PRN
Status: DISCONTINUED | OUTPATIENT
Start: 2024-02-21 | End: 2024-02-24 | Stop reason: HOSPADM

## 2024-02-21 RX ORDER — MAGNESIUM SULFATE IN WATER 40 MG/ML
2000 INJECTION, SOLUTION INTRAVENOUS PRN
Status: DISCONTINUED | OUTPATIENT
Start: 2024-02-21 | End: 2024-02-24 | Stop reason: HOSPADM

## 2024-02-21 RX ADMIN — ONDANSETRON 4 MG: 2 INJECTION INTRAMUSCULAR; INTRAVENOUS at 20:32

## 2024-02-21 RX ADMIN — MIDAZOLAM 4 MG: 1 INJECTION INTRAMUSCULAR; INTRAVENOUS at 20:43

## 2024-02-21 RX ADMIN — Medication 2 MG/HR: at 20:34

## 2024-02-21 RX ADMIN — SODIUM CHLORIDE 1000 ML: 9 INJECTION, SOLUTION INTRAVENOUS at 20:05

## 2024-02-21 RX ADMIN — ETOMIDATE 20 MG: 2 INJECTION INTRAVENOUS at 20:16

## 2024-02-21 RX ADMIN — FENTANYL CITRATE 50 MCG: 50 INJECTION, SOLUTION INTRAMUSCULAR; INTRAVENOUS at 20:35

## 2024-02-21 RX ADMIN — Medication 25 MCG/HR: at 20:32

## 2024-02-21 RX ADMIN — MIDAZOLAM 4 MG: 1 INJECTION INTRAMUSCULAR; INTRAVENOUS at 20:23

## 2024-02-21 RX ADMIN — FENTANYL CITRATE 50 MCG: 50 INJECTION, SOLUTION INTRAMUSCULAR; INTRAVENOUS at 20:22

## 2024-02-21 RX ADMIN — MIDAZOLAM HYDROCHLORIDE 4 MG: 1 INJECTION INTRAMUSCULAR; INTRAVENOUS at 20:43

## 2024-02-21 RX ADMIN — Medication 100 MG: at 20:19

## 2024-02-21 RX ADMIN — SODIUM CHLORIDE: 9 INJECTION, SOLUTION INTRAVENOUS at 22:02

## 2024-02-21 ASSESSMENT — PULMONARY FUNCTION TESTS: PIF_VALUE: 22

## 2024-02-21 NOTE — ED NOTES
This nurse called poison control to inform of patient ingesting a handful of about 20 pills of 0.5mg of xanax about 1 hour prior to arrival in an attempt to \"end it all\". Patient reports he was tired of people yelling at him and telling him what to do. Patient admits to struggling with addiction his whole life. He also reports he injected meth around 0100 this morning. He states \"I do so good for so long and then I fuck up\". Family at bedside arguing back and forth regarding patient condition and actions. Patient states \"I hung out with a friend, who is an addict, and I messed up. I shouldn't have\". EKG completed.    Poison control suggest:  EKG, urine sample, basic lab work, troponin, tylenol and aspirin levels, ETOH and to monitor for respiratory depression/arrest.

## 2024-02-22 LAB
ALBUMIN SERPL BCG-MCNC: 3.9 G/DL (ref 3.5–5.1)
ALP SERPL-CCNC: 56 U/L (ref 38–126)
ALT SERPL W/O P-5'-P-CCNC: 28 U/L (ref 11–66)
ANION GAP SERPL CALC-SCNC: 12 MEQ/L (ref 8–16)
AST SERPL-CCNC: 67 U/L (ref 5–40)
BILIRUB CONJ SERPL-MCNC: < 0.2 MG/DL (ref 0–0.3)
BILIRUB SERPL-MCNC: 0.6 MG/DL (ref 0.3–1.2)
BUN SERPL-MCNC: 27 MG/DL (ref 7–22)
CALCIUM SERPL-MCNC: 8.8 MG/DL (ref 8.5–10.5)
CHLORIDE SERPL-SCNC: 99 MEQ/L (ref 98–111)
CO2 SERPL-SCNC: 23 MEQ/L (ref 23–33)
CREAT SERPL-MCNC: 1.7 MG/DL (ref 0.4–1.2)
DEPRECATED RDW RBC AUTO: 50.2 FL (ref 35–45)
EKG ATRIAL RATE: 67 BPM
EKG ATRIAL RATE: 81 BPM
EKG ATRIAL RATE: 95 BPM
EKG P AXIS: 48 DEGREES
EKG P AXIS: 56 DEGREES
EKG P AXIS: 67 DEGREES
EKG P-R INTERVAL: 130 MS
EKG P-R INTERVAL: 134 MS
EKG P-R INTERVAL: 140 MS
EKG Q-T INTERVAL: 352 MS
EKG Q-T INTERVAL: 404 MS
EKG Q-T INTERVAL: 416 MS
EKG QRS DURATION: 86 MS
EKG QRS DURATION: 88 MS
EKG QRS DURATION: 94 MS
EKG QTC CALCULATION (BAZETT): 439 MS
EKG QTC CALCULATION (BAZETT): 442 MS
EKG QTC CALCULATION (BAZETT): 469 MS
EKG R AXIS: -11 DEGREES
EKG R AXIS: -18 DEGREES
EKG R AXIS: 14 DEGREES
EKG T AXIS: 27 DEGREES
EKG T AXIS: 32 DEGREES
EKG T AXIS: 42 DEGREES
EKG VENTRICULAR RATE: 67 BPM
EKG VENTRICULAR RATE: 81 BPM
EKG VENTRICULAR RATE: 95 BPM
ERYTHROCYTE [DISTWIDTH] IN BLOOD BY AUTOMATED COUNT: 15.6 % (ref 11.5–14.5)
GFR SERPL CREATININE-BSD FRML MDRD: 52 ML/MIN/1.73M2
GLUCOSE SERPL-MCNC: 73 MG/DL (ref 70–108)
HCT VFR BLD AUTO: 38.5 % (ref 42–52)
HGB BLD-MCNC: 12 GM/DL (ref 14–18)
MCH RBC QN AUTO: 27.5 PG (ref 26–33)
MCHC RBC AUTO-ENTMCNC: 31.2 GM/DL (ref 32.2–35.5)
MCV RBC AUTO: 88.1 FL (ref 80–94)
OSMOLALITY SERPL CALC.SUM OF ELEC: 271.9 MOSMOL/KG (ref 275–300)
PLATELET # BLD AUTO: 352 THOU/MM3 (ref 130–400)
PMV BLD AUTO: 9.9 FL (ref 9.4–12.4)
POTASSIUM SERPL-SCNC: 3.8 MEQ/L (ref 3.5–5.2)
PROT SERPL-MCNC: 6.8 G/DL (ref 6.1–8)
RBC # BLD AUTO: 4.37 MILL/MM3 (ref 4.7–6.1)
SODIUM SERPL-SCNC: 134 MEQ/L (ref 135–145)
WBC # BLD AUTO: 8.3 THOU/MM3 (ref 4.8–10.8)

## 2024-02-22 PROCEDURE — 1200000000 HC SEMI PRIVATE

## 2024-02-22 PROCEDURE — 6360000002 HC RX W HCPCS: Performed by: STUDENT IN AN ORGANIZED HEALTH CARE EDUCATION/TRAINING PROGRAM

## 2024-02-22 PROCEDURE — 99291 CRITICAL CARE FIRST HOUR: CPT | Performed by: INTERNAL MEDICINE

## 2024-02-22 PROCEDURE — 80076 HEPATIC FUNCTION PANEL: CPT

## 2024-02-22 PROCEDURE — 80048 BASIC METABOLIC PNL TOTAL CA: CPT

## 2024-02-22 PROCEDURE — 6360000002 HC RX W HCPCS: Performed by: EMERGENCY MEDICINE

## 2024-02-22 PROCEDURE — 94003 VENT MGMT INPAT SUBQ DAY: CPT

## 2024-02-22 PROCEDURE — 93005 ELECTROCARDIOGRAM TRACING: CPT | Performed by: STUDENT IN AN ORGANIZED HEALTH CARE EDUCATION/TRAINING PROGRAM

## 2024-02-22 PROCEDURE — 85027 COMPLETE CBC AUTOMATED: CPT

## 2024-02-22 PROCEDURE — 36415 COLL VENOUS BLD VENIPUNCTURE: CPT

## 2024-02-22 PROCEDURE — 93010 ELECTROCARDIOGRAM REPORT: CPT | Performed by: INTERNAL MEDICINE

## 2024-02-22 PROCEDURE — 6370000000 HC RX 637 (ALT 250 FOR IP): Performed by: STUDENT IN AN ORGANIZED HEALTH CARE EDUCATION/TRAINING PROGRAM

## 2024-02-22 PROCEDURE — 2580000003 HC RX 258: Performed by: STUDENT IN AN ORGANIZED HEALTH CARE EDUCATION/TRAINING PROGRAM

## 2024-02-22 PROCEDURE — 2580000003 HC RX 258: Performed by: EMERGENCY MEDICINE

## 2024-02-22 RX ORDER — QUETIAPINE FUMARATE 25 MG/1
50 TABLET, FILM COATED ORAL NIGHTLY
Status: DISCONTINUED | OUTPATIENT
Start: 2024-02-22 | End: 2024-02-24 | Stop reason: HOSPADM

## 2024-02-22 RX ADMIN — VENLAFAXINE HYDROCHLORIDE 150 MG: 150 CAPSULE, EXTENDED RELEASE ORAL at 08:30

## 2024-02-22 RX ADMIN — ENOXAPARIN SODIUM 40 MG: 100 INJECTION SUBCUTANEOUS at 08:30

## 2024-02-22 RX ADMIN — QUETIAPINE FUMARATE 50 MG: 25 TABLET ORAL at 22:20

## 2024-02-22 RX ADMIN — LANSOPRAZOLE 15 MG: 15 TABLET, ORALLY DISINTEGRATING, DELAYED RELEASE ORAL at 08:30

## 2024-02-22 RX ADMIN — Medication 80 MCG/HR: at 10:22

## 2024-02-22 RX ADMIN — SODIUM CHLORIDE, PRESERVATIVE FREE 10 ML: 5 INJECTION INTRAVENOUS at 21:25

## 2024-02-22 ASSESSMENT — PAIN SCALES - GENERAL: PAINLEVEL_OUTOF10: 0

## 2024-02-22 ASSESSMENT — PULMONARY FUNCTION TESTS
PIF_VALUE: 21
PIF_VALUE: 16
PIF_VALUE: 13

## 2024-02-22 NOTE — PROGRESS NOTES
Patient Weaning Progress    The patient's vent settings was able to be weaned this shift.      Ventilator settings that were weaned              [] Mode   [x] Pressure support weaned   [x] Fio2 weaned   [x] Peep weaned      Spontaneous weaning trial  was not attempted.     due to defined parameters for SBT (spontaneous breathing trial) not being met.     *Results of SBT documented under SBTOUTCOME note.    Reason that defined ventilator parameters for SBT was not met              [] Patient condition requires increased ventilator settings  [] Requires increased sedation   [] Settings not within weaning range   [] SAT not completed   [] Physician orders      Unable to get agreement for goals because no family is present and patient cannot respond.

## 2024-02-22 NOTE — H&P
CRITICAL CARE H&P NOTE      Patient:  Cedric ARMIJO [facility]    Unit/Bed:24/024A  YOB: 1985  MRN: [id number]   PCP: Santosh Zepeda MD  Date of Admission: 2/21/2024  Chief Complaint:-Drug intoxication    Assessment and Plan:    Acute hypoxic respiratory failure: Intubated 2/21 for severe agitation and concerns for airway protection.  CXR with no acute intrathoracic process.  Maintain lung protective strategies.  Continue wean as tolerated for SpO2 > 90%.  Repeat CXR and check ABG if respiratory status worsens  Drug intoxication, multiple: Chronic history of multiple drug abuse, and acutely presented following suicide attempt with intent and plan.  Reportedly attempted OD on Xanax (0.5 mg x 20) and methamphetamine prior.  UDS positive for methamphetamine/amphetamine, benzodiazepine and complaints.  Serum EtOH/acetaminophen/ASA level WNL.  UA nonsignificant.  Started on IVF.  Placed on continuous telemetry.  Monitor QTc.  Repeat EKG in a.m.  RUSLAN: Likely secondary to toxin ingestion.  Presented CR 2.9, BUN 34 with BUN:CR ratio <20. S/p 1 NS bolus x1.  Check urine electrolytes, urine urea, urine creatinine, urine levels and SEGUNDO.  Limit nephrotoxic agent.  Monitor renal function with daily BMP.  GERD: Continue Prozac 40 mg p.o.  Daily  Tobacco abuse: Current smoker, 2 PPD.  Will  on smoking cessation when appropriate  History of psychosis: Continue Seroquel 100 mg p.o. twice daily and Effexor 150 mg p.o. daily.  Consult psychiatry when appropriate      INITIAL H AND P AND ICU COURSE:  The patient is a 38-year-old male with a PMH of hep C, GERD, tobacco use, multidrug abuse and history of psychosis who presented to  ED 2/21/2022 following a suicide attempt.  Per report patient took roughly 20 pills of 0.5 mg Xanax about 1 hour prior to arrival.  He had been noted to have said \"end it all\".  Upon arrival the patient was belligerent and aggressive.  He admitted to have taken IV meth prior to his SI

## 2024-02-22 NOTE — ED NOTES
Report received from JARROD Griggs. Patient resting in bed. Sitter at bedside. Respirations easy and unlabored. No distress noted. Call light within reach.

## 2024-02-22 NOTE — PLAN OF CARE
Problem: Pain  Goal: Verbalizes/displays adequate comfort level or baseline comfort level  Outcome: Progressing  Flowsheets (Taken 2/21/2024 2359)  Verbalizes/displays adequate comfort level or baseline comfort level:   Encourage patient to monitor pain and request assistance   Assess pain using appropriate pain scale     Problem: Neurosensory - Adult  Goal: Achieves stable or improved neurological status  Outcome: Progressing  Flowsheets (Taken 2/21/2024 2359)  Achieves stable or improved neurological status:   Assess for and report changes in neurological status   Initiate measures to prevent increased intracranial pressure     Problem: Respiratory - Adult  Goal: Achieves optimal ventilation and oxygenation  Outcome: Progressing  Flowsheets (Taken 2/21/2024 2359)  Achieves optimal ventilation and oxygenation:   Assess for changes in respiratory status   Assess for changes in mentation and behavior   Position to facilitate oxygenation and minimize respiratory effort     Problem: Skin/Tissue Integrity - Adult  Goal: Skin integrity remains intact  Outcome: Progressing  Flowsheets (Taken 2/21/2024 2359)  Skin Integrity Remains Intact:   Monitor for areas of redness and/or skin breakdown   Assess vascular access sites hourly

## 2024-02-22 NOTE — ED NOTES
Pt resting in bed at this time, eyes closed and repirs unlabored. Pt easily arousal able when spoken to and with minimal stimulation at this time. VSS

## 2024-02-22 NOTE — ED PROVIDER NOTES
vomiting  Admitted to ICU                DO Nathan Douglas, DO Boyd  02/22/24 0144    
visualized through cords: yes    Placement assessment:     ETT at teeth/gumline (cm):  24    Tube secured with:  ETT sears    Breath sounds:  Equal    Placement verification: chest rise, colorimetric ETCO2, CXR verification, direct visualization, equal breath sounds and waveform ETCO2    Post-procedure details:     Procedure completion:  Tolerated    Complications: emesis    :     CRITICAL CARE:  35  minutes    MEDICATION CHANGES     New Prescriptions    No medications on file         FINAL DISPOSITION   Shared Decision-Making was performed, disposition discussed with the patient/family and questions answered.    FINAL DIAGNOSES:  Final diagnoses:   RUSLAN (acute kidney injury) (HCC)   Suicidal ideation   Xanax overdose   Methamphetamine abuse (HCC)       Condition: condition: fair  Dispo: Admit to CCU/ICU  DISPOSITION        This transcription was electronically signed. It was dictated by use of voice recognition software and electronically transcribed. The transcription may contain errors not detected in proofreading.

## 2024-02-22 NOTE — CARE COORDINATION
Case Management Assessment  Initial Evaluation    Date/Time of Evaluation: 2/22/2024 2:42 PM  Assessment Completed by: Delia Maradiaga RN    If patient is discharged prior to next notation, then this note serves as note for discharge by case management.    Patient Name: Cedric Galan                   YOB: 1985  Diagnosis: Suicidal ideation [R45.851]  Methamphetamine abuse ([facility]) [F15.10]  RUSLAN (acute kidney injury) ([facility]) [N17.9]  Drug overdose, intentional self-harm, initial encounter ([facility]) [T50.902A]  Xanax overdose [T42.4X1A]                   Date / Time: 2/21/2024  6:25 PM  Location: [facility]04/004     Patient Admission Status: Inpatient   Readmission Risk Low 0-14, Mod 15-19), High > 20: Readmission Risk Score: 12.1    Current PCP: Santosh Zepeda MD  PCP verified by CM? Yes    Chart Reviewed: Yes      History Provided by: Child/Family (Sister Guera by phone)  Patient Orientation: Sedated (on vent)    Patient Cognition: Other (see comment) (Sedated on vent)    Hospitalization in the last 30 days (Readmission):  No    If yes, Readmission Assessment in  Navigator will be completed.    Advance Directives:      Code Status: Full Code   Patient's Primary Decision Maker is: Legal Next of Kin      Discharge Planning:    Patient lives with: Other (Comment) (Basically homeless; was staying with sister Guera on/off for last week, but cannot go back to Guera's at discharge) Type of Home: Other (Comment) (Basically homeless; was staying with sister Guera on/off for last week, but cannot go back to Guera's at discharge)  Primary Care Giver: Self  Patient Support Systems include: Family Members   Current Financial resources: None  Current community resources: None  Current services prior to admission: None            Current DME:              Type of Home Care services:  None    ADLS  Prior functional level: Independent in ADLs/IADLs  Current functional level: Other (see comment) (HELENA; sedated on

## 2024-02-22 NOTE — ED NOTES
Patient cleaned up and tele leads replaced. Gown replaced. Fresh linens and new chux placed underneath patient.

## 2024-02-22 NOTE — PROGRESS NOTES
CRITICAL CARE PROGRESS NOTE    Patient:  Cedric ARMIJO [facility]    Unit/Bed:4B-04/004-A  YOB: 1985  MRN: [id number]   PCP: Santosh Zepeda MD  Date of Admission: 2/21/2024  Chief Complaint:-Drug intoxication    Assessment and Plan:    Acute Hypoxic Respiratory Failure: Intubated 2/21 for airway protection in setting of benzo overdose, extubated the following morning.  Tolerated well.  Remains on room air.  Intentional Drug Overdose: Reports of witnessed suicidal attempt. 20 pills of 0.5 Mg Xanax and methamphetamines. Confirmed on UDS. Poison control following. Psychiatry consulted. Monitor telemetry, EKG in AM.  RUSLAN: Improving. Cr 2.9, GFR 27 on arrival. Likely 2/2 benzo overdose and ATN. Improving with IV fluids. Continue supportive care, monitor BMP.   Lactic Acidosis: Resolved  Acute Leukocytosis: Resolved. Likely reactive  GERD: Continue PPI  Tobacco Abuse: Current smoker, 2 PPD.  Will  on smoking cessation when appropriate  Hx of Psychosis: Continue home Seroquel and Effexor.  Psychiatry consulted    INITIAL H AND P AND ICU COURSE:  The patient is a 38-year-old male with a PMH of hep C, GERD, tobacco use, multidrug abuse and history of psychosis who presented to  ED 2/21/2022 following a suicide attempt. Per report patient took roughly 20 pills of 0.5 mg Xanax about 1 hour prior to arrival.  He had been noted to have said \"end it all\". Upon arrival the patient was belligerent and aggressive.  He admitted to have taken IV meth prior to his SI attempts..  Poison control was consulted and advised EKG, urine studies, lab work consisting of CBC, BMP, troponin, Tylenol/ASA/EtOH and UDS levels.  The patient's agitation followed by lethargy, difficult to arouse and diaphoresis. He was intubated 2/21/2004 for respiratory failure and airway protection.  The patient was transferred to ICU for further management evaluation. Successfully extubated and transferred to stepdown on 02/22/2024.  NSR    Seen with multidisciplinary ICU team no.  Meets Continued ICU Level Care Criteria:    [x] Yes   [] No - Transfer Planned to listed location:  [] HOSPITALIST CONTACTED-      Case and plan discussed with Dr. Burton.    Electronically signed by Shira Sainz DO  CRITICAL CARE SPECIALIST   Patient seen by me including key components of medical care.  Case discussed with resident physician.  Did well on SBT and extubated 2/22/24.  Italicized font, if present,  represents changes to the note made by me.  CC time 35 minutes.  Time was discontiguous. Time does not include procedure. Time does include my direct assessment of the patient and coordination of care.  Time represents more than 50% of the time involved with patient care by the CC team.  Electronically signed by Jose Eduardo Burton MD.

## 2024-02-22 NOTE — PROGRESS NOTES
Comprehensive Nutrition Assessment    Type and Reason for Visit:  Initial (new vent)    Nutrition Recommendations/Plan:   If unable to extubate and tubefeedings started would recommend Vital 1.2 at 10 ml/hr, increase 10 ml/hr every 6 hours as tolerated to goal of 40 ml/hr.  Bolus 1 (2.5oz) bottle [id number] (protein modular) BID.  Tubefeeding regimen to provide~ 1360 kcals, (1152 TF, 208 protein modular), 124 grams protein (72 TF, 52 protein modular), 106 grams CHO, 5 grams fiber, 779 mls water in 1110 mls volume (960 TF, 150 protein modular)  Additional free water flush per provider.      Malnutrition Assessment:  Malnutrition Status:  Insufficient data (patient intubated) (02/22/24 1120)    Context:  Chronic Illness     Findings of the 6 clinical characteristics of malnutrition:  Energy Intake:   unable to assess  Weight Loss:    no EMR weights in the last year    Body Fat Loss:      no significant body fat loss  Muscle Mass Loss:     no significant muscle mass loss  Fluid Accumulation:     no significant fluid accumulation   Strength:   no performed    Nutrition Assessment:     Pt. nutritionally compromised AEB NPO d/t intubation 2/21/24.  At risk for further nutrition compromise r/t admit with suicidal ideation, drug overdose, RUSLAN, polysubstance abuse and underlying medical condition (hx: hepatitis C, GERD, psychosis).       Nutrition Related Findings:    Pt. Report/Treatments/Miscellaneous: Patient seen, no family present, talked to his RN.  GI Status: no BM since admit  Pertinent Labs: 2/22/24: AST 67, Sodium 134, BUN 27, Creatinine 1.7, positive drug screen  Pertinent Meds: 0.9 NaCl at 125 ml/hr, fentanyl, versed, prevacid, seroquel, effexor     Wound Type: None       Current Nutrition Intake & Therapies:    Average Meal Intake: NPO     Diet NPO    Anthropometric Measures:  Height: 175.3 cm (5' 9\")  Ideal Body Weight (IBW): 160 lbs (73 kg)    Admission Body Weight: 96.9 kg (213 lb 10 oz) (2/21/24  bedscale, no edema)  Current Body Weight: 96.9 kg (213 lb 10 oz) (2/21/24 bedscale, no edema), 133.5 %    Current BMI (kg/m2): 31.5  Usual Body Weight:  (per EMR: 1/21/22: 217#13oz, no weights in the last 1 year)                       BMI Categories: Obese Class 1 (BMI 30.0-34.9)    Estimated Daily Nutrient Needs:  Energy Requirements Based On: Kcal/kg  Weight Used for Energy Requirements:  (97 kg)  Energy (kcal/day): 4871-4042 kcals (11-14 kcals/kg/day)  Weight Used for Protein Requirements: Ideal (73 kg)  Protein (g/day): ~ 110-146 grams (1.5-2 grams/kg/day) pending renal status     Fluid (ml/day):  per Provider    Nutrition Diagnosis:   Inadequate oral intake related to impaired respiratory function as evidenced by NPO or clear liquid status due to medical condition, intubation    Nutrition Interventions:   Food and/or Nutrient Delivery: Continue NPO  Nutrition Education/Counseling: No recommendation at this time  Coordination of Nutrition Care: Continue to monitor while inpatient, Interdisciplinary Rounds       Goals:     Goals: by next RD assessment (Initiate nutrition support or PO diet)       Nutrition Monitoring and Evaluation:      Food/Nutrient Intake Outcomes: IVF Intake (Nutrition Support Needs)  Physical Signs/Symptoms Outcomes: Biochemical Data, GI Status, Fluid Status or Edema, Nutrition Focused Physical Findings, Weight    Discharge Planning:    Too soon to determine     Allison C Schwab, RD, LD  Contact: [phone]

## 2024-02-22 NOTE — PROGRESS NOTES
Positive color change at this time per Dr. Mensah. Bilateral breath sounds noted. ETT tube secured at 24 to the lip.

## 2024-02-22 NOTE — ED NOTES
Dr. Evans and Dr. Mensah at bedside. Patient to be intubated per Dr. Evans. Patient is snoring and hard to arose at this time and is very diaphoretic.

## 2024-02-22 NOTE — ED NOTES
Patient intubated. Patient started throwing up at this time around the ET tube. OG in place. Patient continues to vomit and appears agitated, trying to pull tube out. Patient medicated per MAR. Large amounts of wu/brown emesis noted. Dr. Evans and Dr. Mensah at bedside during this episode.

## 2024-02-22 NOTE — ED NOTES
Patient on NRB at this time. Oxygen 100%. Respiratory at bedside, Dr. Evans and Dr. Mensah at bedside.

## 2024-02-23 PROBLEM — N17.9 AKI (ACUTE KIDNEY INJURY) (HCC): Status: ACTIVE | Noted: 2024-02-23

## 2024-02-23 LAB
ANION GAP SERPL CALC-SCNC: 12 MEQ/L (ref 8–16)
BUN SERPL-MCNC: 17 MG/DL (ref 7–22)
CALCIUM SERPL-MCNC: 9 MG/DL (ref 8.5–10.5)
CHLORIDE SERPL-SCNC: 104 MEQ/L (ref 98–111)
CO2 SERPL-SCNC: 22 MEQ/L (ref 23–33)
CREAT SERPL-MCNC: 1.2 MG/DL (ref 0.4–1.2)
DEPRECATED RDW RBC AUTO: 48.8 FL (ref 35–45)
ERYTHROCYTE [DISTWIDTH] IN BLOOD BY AUTOMATED COUNT: 15.2 % (ref 11.5–14.5)
GFR SERPL CREATININE-BSD FRML MDRD: > 60 ML/MIN/1.73M2
GLUCOSE SERPL-MCNC: 116 MG/DL (ref 70–108)
HCT VFR BLD AUTO: 36 % (ref 42–52)
HGB BLD-MCNC: 11.6 GM/DL (ref 14–18)
LACTATE SERPL-SCNC: 1 MMOL/L (ref 0.5–2)
MCH RBC QN AUTO: 28.2 PG (ref 26–33)
MCHC RBC AUTO-ENTMCNC: 32.2 GM/DL (ref 32.2–35.5)
MCV RBC AUTO: 87.4 FL (ref 80–94)
PLATELET # BLD AUTO: 310 THOU/MM3 (ref 130–400)
PMV BLD AUTO: 10.1 FL (ref 9.4–12.4)
POTASSIUM SERPL-SCNC: 4.3 MEQ/L (ref 3.5–5.2)
RBC # BLD AUTO: 4.12 MILL/MM3 (ref 4.7–6.1)
SODIUM SERPL-SCNC: 138 MEQ/L (ref 135–145)
WBC # BLD AUTO: 9.9 THOU/MM3 (ref 4.8–10.8)

## 2024-02-23 PROCEDURE — 2580000003 HC RX 258: Performed by: STUDENT IN AN ORGANIZED HEALTH CARE EDUCATION/TRAINING PROGRAM

## 2024-02-23 PROCEDURE — 83605 ASSAY OF LACTIC ACID: CPT

## 2024-02-23 PROCEDURE — 2580000003 HC RX 258: Performed by: PHYSICIAN ASSISTANT

## 2024-02-23 PROCEDURE — 1200000000 HC SEMI PRIVATE

## 2024-02-23 PROCEDURE — 85027 COMPLETE CBC AUTOMATED: CPT

## 2024-02-23 PROCEDURE — 90792 PSYCH DIAG EVAL W/MED SRVCS: CPT | Performed by: PSYCHIATRY & NEUROLOGY

## 2024-02-23 PROCEDURE — 80048 BASIC METABOLIC PNL TOTAL CA: CPT

## 2024-02-23 PROCEDURE — 6370000000 HC RX 637 (ALT 250 FOR IP): Performed by: PHYSICIAN ASSISTANT

## 2024-02-23 PROCEDURE — 6360000002 HC RX W HCPCS: Performed by: STUDENT IN AN ORGANIZED HEALTH CARE EDUCATION/TRAINING PROGRAM

## 2024-02-23 PROCEDURE — 36415 COLL VENOUS BLD VENIPUNCTURE: CPT

## 2024-02-23 PROCEDURE — 6370000000 HC RX 637 (ALT 250 FOR IP): Performed by: STUDENT IN AN ORGANIZED HEALTH CARE EDUCATION/TRAINING PROGRAM

## 2024-02-23 PROCEDURE — 99233 SBSQ HOSP IP/OBS HIGH 50: CPT | Performed by: PHYSICIAN ASSISTANT

## 2024-02-23 PROCEDURE — 87040 BLOOD CULTURE FOR BACTERIA: CPT

## 2024-02-23 RX ORDER — BUPRENORPHINE AND NALOXONE 8; 2 MG/1; MG/1
1 FILM, SOLUBLE BUCCAL; SUBLINGUAL DAILY
Status: DISCONTINUED | OUTPATIENT
Start: 2024-02-23 | End: 2024-02-24 | Stop reason: HOSPADM

## 2024-02-23 RX ORDER — SODIUM CHLORIDE 9 MG/ML
INJECTION, SOLUTION INTRAVENOUS CONTINUOUS
Status: DISCONTINUED | OUTPATIENT
Start: 2024-02-23 | End: 2024-02-24

## 2024-02-23 RX ADMIN — SODIUM CHLORIDE: 9 INJECTION, SOLUTION INTRAVENOUS at 21:04

## 2024-02-23 RX ADMIN — ENOXAPARIN SODIUM 40 MG: 100 INJECTION SUBCUTANEOUS at 08:00

## 2024-02-23 RX ADMIN — BUPRENORPHINE AND NALOXONE 1 FILM: 8; 2 FILM, SOLUBLE BUCCAL; SUBLINGUAL at 20:05

## 2024-02-23 RX ADMIN — SODIUM CHLORIDE: 9 INJECTION, SOLUTION INTRAVENOUS at 10:31

## 2024-02-23 RX ADMIN — ACETAMINOPHEN 650 MG: 325 TABLET ORAL at 03:36

## 2024-02-23 RX ADMIN — SODIUM CHLORIDE, PRESERVATIVE FREE 10 ML: 5 INJECTION INTRAVENOUS at 07:57

## 2024-02-23 RX ADMIN — QUETIAPINE FUMARATE 50 MG: 25 TABLET ORAL at 22:15

## 2024-02-23 RX ADMIN — VENLAFAXINE HYDROCHLORIDE 150 MG: 150 CAPSULE, EXTENDED RELEASE ORAL at 07:57

## 2024-02-23 RX ADMIN — LANSOPRAZOLE 15 MG: 15 TABLET, ORALLY DISINTEGRATING, DELAYED RELEASE ORAL at 06:20

## 2024-02-23 ASSESSMENT — PATIENT HEALTH QUESTIONNAIRE - PHQ9
8. MOVING OR SPEAKING SO SLOWLY THAT OTHER PEOPLE COULD HAVE NOTICED. OR THE OPPOSITE, BEING SO FIGETY OR RESTLESS THAT YOU HAVE BEEN MOVING AROUND A LOT MORE THAN USUAL: 0
2. FEELING DOWN, DEPRESSED OR HOPELESS: 2
SUM OF ALL RESPONSES TO PHQ9 QUESTIONS 1 & 2: 3
9. THOUGHTS THAT YOU WOULD BE BETTER OFF DEAD, OR OF HURTING YOURSELF: 1
SUM OF ALL RESPONSES TO PHQ QUESTIONS 1-9: 9
3. TROUBLE FALLING OR STAYING ASLEEP: 1
SUM OF ALL RESPONSES TO PHQ QUESTIONS 1-9: 9
SUM OF ALL RESPONSES TO PHQ QUESTIONS 1-9: 9
6. FEELING BAD ABOUT YOURSELF - OR THAT YOU ARE A FAILURE OR HAVE LET YOURSELF OR YOUR FAMILY DOWN: 2
4. FEELING TIRED OR HAVING LITTLE ENERGY: 1
1. LITTLE INTEREST OR PLEASURE IN DOING THINGS: 1
7. TROUBLE CONCENTRATING ON THINGS, SUCH AS READING THE NEWSPAPER OR WATCHING TELEVISION: 0
10. IF YOU CHECKED OFF ANY PROBLEMS, HOW DIFFICULT HAVE THESE PROBLEMS MADE IT FOR YOU TO DO YOUR WORK, TAKE CARE OF THINGS AT HOME, OR GET ALONG WITH OTHER PEOPLE: 2
SUM OF ALL RESPONSES TO PHQ QUESTIONS 1-9: 8
5. POOR APPETITE OR OVEREATING: 1

## 2024-02-23 ASSESSMENT — LIFESTYLE VARIABLES
HOW MANY STANDARD DRINKS CONTAINING ALCOHOL DO YOU HAVE ON A TYPICAL DAY: 1 OR 2
HOW OFTEN DO YOU HAVE A DRINK CONTAINING ALCOHOL: MONTHLY OR LESS

## 2024-02-23 NOTE — CONSULTS
Brief Intervention and Referral to Treatment Summary    Patient was provided PHQ-9, AUDIT-C and DAST Screening:      PHQ-9 Score: 9  AUDIT-C Score:  1  DAST Score:  2    Patient’s substance use is considered     Low Risk/Healthy      Patient’s depression is considered:     Mild    Brief Education Was Provided    Patient was receptive      Brief Intervention Is Provided (Only for AUDIT-C or DAST)     Patient reports readiness to decrease and/or stop use and a plan was discussed     Recommendations/Referrals for Brief and/or Specialized Treatment Provided to Patient:  Pt denies any concerns with substance use, reports using methamphetamine and xanax monthly. Reports alcohol use socially. Pt is currently prescribed Sub[facility] at [facility] however is receptive to resources. AOD packet provided.

## 2024-02-23 NOTE — CONSULTS
Department of Psychiatry   Psychiatric Assessment      Thank you very much for allowing us to participate in the care of this patient.      Reason for Consult:  Suicide attempt    HISTORY OF PRESENT ILLNESS:    Patient is a 38-year-old male with extensive history of polysubstance abuse including opioids methamphetamine, currently on Suboxone from [facility], presented after a suicide attempt by taking over 20 pills of Xanax that he bought from the street.  Patient reports that he recently lost his job due to methamphetamine abuse and has been feeling increasingly depressed since then.  Reports last few days he has been feeling helpless and hopeless.  Reports that everything hit him yesterday following which he took the pills with the intent to kill self.  Reports some trouble falling asleep and staying asleep.  Reports poor appetite.  Discussed with him about inpatient psychiatric hospitalization after he is medically cleared and he is agreeable to the plan.    PSYCHIATRIC HISTORY: hx of depression      Outpatient psychiatric provider:  [facility]  Suicide attempts: multiple  Inpatient psychiatric admissions: one in 2022  Past psychiatric medications includes:      Effexor, Abilify, Trazodone, Vistaril    Adverse reactions from psychotropic medications:     No    [facility] Psychiatric Review of Systems         Obsessions and Compulsions: Denies       Nadia or Hypomania: Denies     Hallucinations: Denies     Panic Attacks:  Denies     Delusions:  paranoid     Phobias:  Denies     Trauma: Denies    Prior to Admission medications    Medication Sig Start Date End Date Taking? Authorizing Provider   traZODone (DESYREL) 50 MG tablet Take 1 tablet by mouth nightly as needed for Sleep 4/1/22   Tristan Hutchison PA-C   QUEtiapine (SEROQUEL) 100 MG tablet Take 1 tablet by mouth 2 times daily 4/1/22   Tristan Hutchison PA-C   pantoprazole (PROTONIX) 40 MG tablet Take 1 tablet by mouth every morning (before breakfast)  4/1/22   Tristan Hutchison PA-C   venlafaxine (EFFEXOR XR) 75 MG extended release capsule Take 1 capsule by mouth daily (with breakfast) 4/1/22   Tristan Hutchison PA-C        Medications:    Current Facility-Administered Medications: lansoprazole (PREVACID SOLUTAB) disintegrating tablet 15 mg, 15 mg, Oral, QAM AC  QUEtiapine (SEROQUEL) tablet 50 mg, 50 mg, Oral, Nightly  sodium chloride flush 0.9 % injection 5-40 mL, 5-40 mL, IntraVENous, 2 times per day  sodium chloride flush 0.9 % injection 5-40 mL, 5-40 mL, IntraVENous, PRN  0.9 % sodium chloride infusion, , IntraVENous, PRN  enoxaparin (LOVENOX) injection 40 mg, 40 mg, SubCUTAneous, Q24H  ondansetron (ZOFRAN-ODT) disintegrating tablet 4 mg, 4 mg, Oral, Q8H PRN **OR** ondansetron (ZOFRAN) injection 4 mg, 4 mg, IntraVENous, Q6H PRN  polyethylene glycol (GLYCOLAX) packet 17 g, 17 g, Oral, Daily PRN  acetaminophen (TYLENOL) tablet 650 mg, 650 mg, Oral, Q6H PRN **OR** acetaminophen (TYLENOL) suppository 650 mg, 650 mg, Rectal, Q6H PRN  glucose chewable tablet 16 g, 4 tablet, Oral, PRN  dextrose bolus 10% 125 mL, 125 mL, IntraVENous, PRN **OR** dextrose bolus 10% 250 mL, 250 mL, IntraVENous, PRN  Glucagon Emergency KIT 1 mg, 1 mg, SubCUTAneous, PRN  dextrose 10 % infusion, , IntraVENous, Continuous PRN  potassium chloride (KLOR-CON M) extended release tablet 40 mEq, 40 mEq, Oral, PRN **OR** potassium bicarb-citric acid (EFFER-K) effervescent tablet 40 mEq, 40 mEq, Oral, PRN **OR** potassium chloride 10 mEq/100 mL IVPB (Peripheral Line), 10 mEq, IntraVENous, PRN  magnesium sulfate 2000 mg in 50 mL IVPB premix, 2,000 mg, IntraVENous, PRN  sodium phosphate 15 mmol in sodium chloride 0.9 % 250 mL IVPB, 15 mmol, IntraVENous, PRN  calcium gluconate 2,000 mg in sodium chloride 100 mL, 2,000 mg, IntraVENous, PRN  traZODone (DESYREL) tablet 50 mg, 50 mg, Oral, Nightly PRN  venlafaxine (EFFEXOR XR) extended release capsule 150 mg, 150 mg, Oral, Daily with breakfast  vecuronium

## 2024-02-23 NOTE — PROGRESS NOTES
Reinforced suicide precautions with patient.  Reinforced that visitors will be screened and may be limited at the discretion of the nurse.  Visitor belongings are subject to be searched.  Belongings may not be allowed into the patient room.    Reviewed any personal belongings from the previous shift believed to be a threat to patient safety removed from room.   Room screened for safety, items removed to create a safe environment include:   Blood pressure cuff   Loose or extra cords, tubing (not of medical necessity)   Extra Linens   Telephone   Toiletries   Trash Liners   Patient's cell phone and charging cord (must be removed from room)      Safety tray ordered: yes    Expectations were discussed with gabriella Figueroa (unit support aide).  Sitter positioned near exit.  Sitter reminded that patient should be observed at all times including toileting and bathing.  Gabriella has  documentation sheet.    Patient and sitter included in hourly rounds.

## 2024-02-23 NOTE — PLAN OF CARE
Problem: Discharge Planning  Goal: Discharge to home or other facility with appropriate resources  2/23/2024 1627 by Rosaura Quiroga RN  Outcome: Progressing  Discharge plan is in process. Plan discharge to psych unit.     Problem: Pain  Goal: Verbalizes/displays adequate comfort level or baseline comfort level  2/23/2024 1627 by Rosaura Quiroga RN  Outcome: Progressing  Patient states pain relief from PRN pain medications. Pain reassessed one hour post PRN pain medication given.  Patient rates pain 0 on THEA 0-10 scale.     Problem: Neurosensory - Adult  Goal: Achieves stable or improved neurological status  2/23/2024 1627 by Rosaura Quiroga RN  Outcome: Progressing  Patient alert & oriented x 4. Patient able to follow commands. Hand grasps equal bilaterally.     Problem: Respiratory - Adult  Goal: Achieves optimal ventilation and oxygenation  2/23/2024 1627 by Rosaura Quiroga RN  Outcome: Progressing  Patients oxygen saturation 93 % on room air.  No shortness of breath noted. Lung sounds clear, able C&DB as ordered.     Problem: Skin/Tissue Integrity - Adult  Goal: Skin integrity remains intact  2/23/2024 1627 by Rosaura Quiroga RN  Outcome: Progressing  No skin breakdown this shift. Patient being assisted with turning. Patients states understanding of repositioning every two hours.     Problem: Genitourinary - Adult  Goal: Absence of urinary retention  2/23/2024 1627 by Rosaura Quiroga RN  Outcome: Progressing  Patient voiding adequate  amounts without difficulty.     Problem: Metabolic/Fluid and Electrolytes - Adult  Goal: Hemodynamic stability and optimal renal function maintained  2/23/2024 1627 by Rosaura Quiroga RN  Outcome: Progressing  Hemodynamic stability and optimal renal function maintained.     Problem: Skin/Tissue Integrity  Goal: Absence of new skin breakdown  Description: 1.  Monitor for areas of redness and/or skin breakdown  2.  Assess vascular access  sites hourly  3.  Every 4-6 hours minimum:  Change oxygen saturation probe site  4.  Every 4-6 hours:  If on nasal continuous positive airway pressure, respiratory therapy assess nares and determine need for appliance change or resting period.  2/23/2024 1627 by Rosaura Quiroga RN  Outcome: Progressing  No skin breakdown this shift. Patient being assisted with turning. Patients states understanding of repositioning every two hours.     Problem: Safety - Adult  Goal: Free from fall injury  2/23/2024 1627 by Rosaura Quiroga, RN  Outcome: Progressing  Fall assessment completed. Patient using call light appropriately to call for assistance with ambulation to bathroom.  Personal items within reach. Patient is also compliant with use of non-skid slippers.      Problem: ABCDS Injury Assessment  Goal: Absence of physical injury  2/23/2024 1627 by Rosaura Quiroga, RN  Outcome: Progressing  No falls noted this shift. Patient ambulates with x1 staff assistance without difficulty. Bed kept in low position. Safe environment maintained. Bedside table & call light in reach. Uses call light appropriately when needing assistance.      Problem: Self Harm/Suicidality  Goal: Will have no self-injury during hospital stay  Description: INTERVENTIONS:  1.  Ensure constant observer at bedside with Q15M safety checks  2.  Maintain a safe environment  3.  Secure patient belongings  4.  Ensure family/visitors adhere to safety recommendations  5.  Ensure safety tray has been added to patient's diet order  6.  Every shift and PRN: Re-assess suicidal risk via Frequent Screener    Outcome: Progressing  Patient has sitter in room 24 hours around clock. Patient able to verbalized feelings. No suicidal thoughts for shift.    Care plan reviewed with patient and family.  Patient and family verbalize understanding of the plan of care and contribute to goal setting.

## 2024-02-23 NOTE — PROGRESS NOTES
Hospitalist Progress Note    Patient:  Cedric ARMIJO [facility]      Unit/Bed:5K-04/004-A    YOB: 1985    MRN: [id number]       Acct: [id number]     PCP: Santosh Zepeda MD    Date of Admission: 2/21/2024    Assessment/Plan:    Acute Hypoxic Respiratory Failure secondary to drug overdose:  Intubated 2/21 to 2/22 for airway protection in setting of benzo overdose  Tolerated well.   Currently on room air satting 95%  Intentional Drug Overdose with chronic polysubstance abuse:   Reports of witnessed suicidal attempt. 20 pills of 0.5 Mg Xanax and methamphetamines. Confirmed on UDS. Poison control following.   Psychiatry following- admit to  once medically stable.    Monitor telemetry, EKG in AM.  Opioid use disorder  Prescribed suboxone in the outpatient setting according to PDMP- actively withdrawals.  Resume suboxone.   Fever - most likely to opioid withdrwawl  102.6 overnight with tachycardia  Check blood cultures, lactic acid, resume fluids  Urine negative on admit.  No leukocytosis  RUSLAN- resolved:   Cr 2.9, GFR 27 on arrival. Likely 2/2 benzo overdose and ATN.  Cr currently 1.2 with recent baseline of 1.0  Improved with IV fluids.   continue supportive care, monitor BMP.   Lactic Acidosis: Resolved  Acute Leukocytosis: Resolved. Likely reactive  History of Hep C - reports he was treated several years ago at [facility]   GERD: Continue PPI  Tobacco Abuse: Current smoker, 2 PPD.  Will  on smoking cessation when appropriate  Hx of Psychosis: Continue home Seroquel and Effexor.  Psychiatry consulted      Expected discharge date:  pending course     Disposition:    [] Home       [] TCU       [] Rehab       [] Psych       [] SNF       [] Long Term Care Facility       [x] Other- 4E     Chief Complaint: Drug overdose     Hospital Course:   Initial HPI Per chart review  \"The patient is a 38-year-old male with a PMH of hep C, GERD, tobacco use, multidrug abuse and history of psychosis

## 2024-02-23 NOTE — CARE COORDINATION
2/23/24, 12:04 PM EST    DISCHARGE ON GOING EVALUATION    Cedric ARMIJO [facility] day: 2  Location: -04/004-A Reason for admit: Suicidal ideation [R45.851]  Methamphetamine abuse ([facility]) [F15.10]  RUSLAN (acute kidney injury) ([facility]) [N17.9]  Drug overdose, intentional self-harm, initial encounter ([facility]) [T50.902A]  Xanax overdose [T42.4X1A]   Procedure:   2/21 Intubated  2/21 CXR: Decreased inspiration with crowding of blood vessels in the hilar regions.   The lungs are otherwise well-aerated.  2/22 Extubated  Barriers to Discharge: Hospitalist and psych following. Addiction services consult. Suicide precautions/sitter. Seroquel and effexor.   PCP: Santosh Zepeda MD  Readmission Risk Score: 9.6%  Patient Goals/Plan/Treatment Preferences: Plan 4E once medically cleared.

## 2024-02-23 NOTE — PLAN OF CARE
Problem: Discharge Planning  Goal: Discharge to home or other facility with appropriate resources  Outcome: Progressing  Flowsheets (Taken 2/22/2024 2100)  Discharge to home or other facility with appropriate resources:   Identify barriers to discharge with patient and caregiver   Arrange for needed discharge resources and transportation as appropriate   Identify discharge learning needs (meds, wound care, etc)   Refer to discharge planning if patient needs post-hospital services based on physician order or complex needs related to functional status, cognitive ability or social support system      Problem: Pain  Goal: Verbalizes/displays adequate comfort level or baseline comfort level  Outcome: Progressing  Flowsheets (Taken 2/22/2024 2300)  Verbalizes/displays adequate comfort level or baseline comfort level:   Encourage patient to monitor pain and request assistance   Assess pain using appropriate pain scale   Administer analgesics based on type and severity of pain and evaluate response   Implement non-pharmacological measures as appropriate and evaluate response   Notify Licensed Independent Practitioner if interventions unsuccessful or patient reports new pain     Problem: Neurosensory - Adult  Goal: Achieves stable or improved neurological status  Outcome: Progressing  Flowsheets (Taken 2/22/2024 2100)  Achieves stable or improved neurological status:   Assess for and report changes in neurological status   Initiate measures to prevent increased intracranial pressure   Maintain blood pressure and fluid volume within ordered parameters to optimize cerebral perfusion and minimize risk of hemorrhage   Monitor temperature, glucose, and sodium. Initiate appropriate interventions as ordered     Problem: Respiratory - Adult  Goal: Achieves optimal ventilation and oxygenation  Outcome: Progressing  Flowsheets (Taken 2/22/2024 2100)  Achieves optimal ventilation and oxygenation:   Assess for changes in respiratory    Problem: Metabolic/Fluid and Electrolytes - Adult  Goal: Hemodynamic stability and optimal renal function maintained  Outcome: Progressing  Flowsheets (Taken 2/22/2024 2100)  Hemodynamic stability and optimal renal function maintained:   Monitor labs and assess for signs and symptoms of volume excess or deficit   Monitor intake, output and patient weight   Monitor urine specific gravity, serum osmolarity and serum sodium as indicated or ordered   Encourage oral intake as appropriate   Instruct patient on fluid and nutrition restrictions as appropriate.      Care plan reviewed with patient.  Patient verbalize understanding of the plan of care and contribute to goal setting.

## 2024-02-23 NOTE — PROGRESS NOTES
Reinforced suicide precautions with patient.  Reinforced that visitors will be screened and may be limited at the discretion of the nurse.  Visitor belongings are subject to be searched.  Belongings may not be allowed into the patient room.    Reviewed any personal belongings from the previous shift believed to be a threat to patient safety removed from room.  Belongings removed include:   Placed in secure area .    Room screened for safety, items removed to create a safe environment include:   Blood pressure cuff   Loose or extra cords, tubing (not of medical necessity)   Extra Linens   Telephone   Toiletries   Trash Liners   Patient's cell phone and charging cord (must be removed from room)      Safety tray ordered: yes    Expectations were discussed with sitter (unit support aide).  Sitter positioned near exit.  Sitter reminded that patient should be observed at all times including toileting and bathing.  Sitter has  documentation sheet.    Patient and sitter included in hourly rounds.

## 2024-02-24 ENCOUNTER — HOSPITAL ENCOUNTER (INPATIENT)
Age: 39
LOS: 2 days | Discharge: HOME | DRG: 751 | End: 2024-02-26
Attending: PSYCHIATRY & NEUROLOGY | Admitting: PSYCHIATRY & NEUROLOGY
Payer: COMMERCIAL

## 2024-02-24 VITALS
RESPIRATION RATE: 16 BRPM | HEART RATE: 52 BPM | HEIGHT: 69 IN | DIASTOLIC BLOOD PRESSURE: 62 MMHG | SYSTOLIC BLOOD PRESSURE: 93 MMHG | WEIGHT: 203.26 LBS | TEMPERATURE: 98 F | OXYGEN SATURATION: 96 % | BODY MASS INDEX: 30.11 KG/M2

## 2024-02-24 PROBLEM — F33.0 MDD (MAJOR DEPRESSIVE DISORDER), RECURRENT EPISODE, MILD (HCC): Status: ACTIVE | Noted: 2024-02-24

## 2024-02-24 LAB
ANION GAP SERPL CALC-SCNC: 8 MEQ/L (ref 8–16)
BUN SERPL-MCNC: 8 MG/DL (ref 7–22)
CALCIUM SERPL-MCNC: 8.5 MG/DL (ref 8.5–10.5)
CHLORIDE SERPL-SCNC: 107 MEQ/L (ref 98–111)
CO2 SERPL-SCNC: 24 MEQ/L (ref 23–33)
CREAT SERPL-MCNC: 0.9 MG/DL (ref 0.4–1.2)
DEPRECATED RDW RBC AUTO: 48.6 FL (ref 35–45)
ERYTHROCYTE [DISTWIDTH] IN BLOOD BY AUTOMATED COUNT: 15.1 % (ref 11.5–14.5)
GFR SERPL CREATININE-BSD FRML MDRD: > 60 ML/MIN/1.73M2
GLUCOSE SERPL-MCNC: 96 MG/DL (ref 70–108)
HCT VFR BLD AUTO: 32.2 % (ref 42–52)
HGB BLD-MCNC: 10 GM/DL (ref 14–18)
HGB BLD-MCNC: 10.4 GM/DL (ref 14–18)
MCH RBC QN AUTO: 27.6 PG (ref 26–33)
MCHC RBC AUTO-ENTMCNC: 31.1 GM/DL (ref 32.2–35.5)
MCV RBC AUTO: 89 FL (ref 80–94)
PLATELET # BLD AUTO: 250 THOU/MM3 (ref 130–400)
PMV BLD AUTO: 10.3 FL (ref 9.4–12.4)
POTASSIUM SERPL-SCNC: 4.3 MEQ/L (ref 3.5–5.2)
RBC # BLD AUTO: 3.62 MILL/MM3 (ref 4.7–6.1)
SODIUM SERPL-SCNC: 139 MEQ/L (ref 135–145)
WBC # BLD AUTO: 5.6 THOU/MM3 (ref 4.8–10.8)

## 2024-02-24 PROCEDURE — 80048 BASIC METABOLIC PNL TOTAL CA: CPT

## 2024-02-24 PROCEDURE — 36415 COLL VENOUS BLD VENIPUNCTURE: CPT

## 2024-02-24 PROCEDURE — 85018 HEMOGLOBIN: CPT

## 2024-02-24 PROCEDURE — 90792 PSYCH DIAG EVAL W/MED SRVCS: CPT | Performed by: PSYCHIATRY & NEUROLOGY

## 2024-02-24 PROCEDURE — 2580000003 HC RX 258: Performed by: PHYSICIAN ASSISTANT

## 2024-02-24 PROCEDURE — 1240000000 HC EMOTIONAL WELLNESS R&B

## 2024-02-24 PROCEDURE — 6370000000 HC RX 637 (ALT 250 FOR IP): Performed by: PSYCHIATRY & NEUROLOGY

## 2024-02-24 PROCEDURE — 85027 COMPLETE CBC AUTOMATED: CPT

## 2024-02-24 PROCEDURE — 6370000000 HC RX 637 (ALT 250 FOR IP): Performed by: STUDENT IN AN ORGANIZED HEALTH CARE EDUCATION/TRAINING PROGRAM

## 2024-02-24 PROCEDURE — 99239 HOSP IP/OBS DSCHRG MGMT >30: CPT | Performed by: PHYSICIAN ASSISTANT

## 2024-02-24 PROCEDURE — 6360000002 HC RX W HCPCS: Performed by: STUDENT IN AN ORGANIZED HEALTH CARE EDUCATION/TRAINING PROGRAM

## 2024-02-24 PROCEDURE — 6370000000 HC RX 637 (ALT 250 FOR IP): Performed by: PHYSICIAN ASSISTANT

## 2024-02-24 RX ORDER — ACETAMINOPHEN 325 MG/1
650 TABLET ORAL EVERY 4 HOURS PRN
Status: DISCONTINUED | OUTPATIENT
Start: 2024-02-24 | End: 2024-02-26 | Stop reason: HOSPADM

## 2024-02-24 RX ORDER — LISINOPRIL 10 MG/1
10 TABLET ORAL EVERY MORNING
Status: ON HOLD | COMMUNITY
Start: 2023-12-04 | End: 2024-02-26 | Stop reason: HOSPADM

## 2024-02-24 RX ORDER — NICOTINE 21 MG/24HR
1 PATCH, TRANSDERMAL 24 HOURS TRANSDERMAL DAILY
Status: DISCONTINUED | OUTPATIENT
Start: 2024-02-24 | End: 2024-02-26 | Stop reason: HOSPADM

## 2024-02-24 RX ORDER — MAGNESIUM HYDROXIDE/ALUMINUM HYDROXICE/SIMETHICONE 120; 1200; 1200 MG/30ML; MG/30ML; MG/30ML
30 SUSPENSION ORAL EVERY 6 HOURS PRN
Status: DISCONTINUED | OUTPATIENT
Start: 2024-02-24 | End: 2024-02-26 | Stop reason: HOSPADM

## 2024-02-24 RX ORDER — TRAZODONE HYDROCHLORIDE 50 MG/1
50 TABLET ORAL NIGHTLY PRN
Status: DISCONTINUED | OUTPATIENT
Start: 2024-02-24 | End: 2024-02-26 | Stop reason: HOSPADM

## 2024-02-24 RX ORDER — VENLAFAXINE HYDROCHLORIDE 150 MG/1
150 CAPSULE, EXTENDED RELEASE ORAL
Status: DISCONTINUED | OUTPATIENT
Start: 2024-02-25 | End: 2024-02-26 | Stop reason: HOSPADM

## 2024-02-24 RX ORDER — QUETIAPINE FUMARATE 25 MG/1
50 TABLET, FILM COATED ORAL NIGHTLY
Status: DISCONTINUED | OUTPATIENT
Start: 2024-02-24 | End: 2024-02-26 | Stop reason: HOSPADM

## 2024-02-24 RX ORDER — VENLAFAXINE HYDROCHLORIDE 150 MG/1
150 CAPSULE, EXTENDED RELEASE ORAL
Qty: 30 CAPSULE | Refills: 3 | Status: ON HOLD
Start: 2024-02-25 | End: 2024-02-26 | Stop reason: HOSPADM

## 2024-02-24 RX ORDER — TRAZODONE HYDROCHLORIDE 50 MG/1
50 TABLET ORAL NIGHTLY PRN
Status: CANCELLED | OUTPATIENT
Start: 2024-02-24

## 2024-02-24 RX ORDER — QUETIAPINE FUMARATE 25 MG/1
50 TABLET, FILM COATED ORAL NIGHTLY
Status: CANCELLED | OUTPATIENT
Start: 2024-02-24

## 2024-02-24 RX ORDER — BUPRENORPHINE AND NALOXONE 8; 2 MG/1; MG/1
1 FILM, SOLUBLE BUCCAL; SUBLINGUAL DAILY
Status: CANCELLED | OUTPATIENT
Start: 2024-02-25

## 2024-02-24 RX ORDER — HYDROXYZINE HYDROCHLORIDE 25 MG/1
50 TABLET, FILM COATED ORAL 3 TIMES DAILY PRN
Status: DISCONTINUED | OUTPATIENT
Start: 2024-02-24 | End: 2024-02-26 | Stop reason: HOSPADM

## 2024-02-24 RX ORDER — QUETIAPINE FUMARATE 50 MG/1
50 TABLET, FILM COATED ORAL NIGHTLY
Qty: 60 TABLET | Refills: 3 | Status: ON HOLD
Start: 2024-02-24 | End: 2024-02-26 | Stop reason: HOSPADM

## 2024-02-24 RX ORDER — BUPRENORPHINE AND NALOXONE 8; 2 MG/1; MG/1
1 FILM, SOLUBLE BUCCAL; SUBLINGUAL DAILY
Qty: 30 FILM | Refills: 0 | Status: ON HOLD
Start: 2024-02-25 | End: 2024-02-26 | Stop reason: HOSPADM

## 2024-02-24 RX ORDER — BUPRENORPHINE AND NALOXONE 8; 2 MG/1; MG/1
1 FILM, SOLUBLE BUCCAL; SUBLINGUAL DAILY
Status: DISCONTINUED | OUTPATIENT
Start: 2024-02-25 | End: 2024-02-26 | Stop reason: HOSPADM

## 2024-02-24 RX ORDER — VENLAFAXINE HYDROCHLORIDE 150 MG/1
150 CAPSULE, EXTENDED RELEASE ORAL
Status: CANCELLED | OUTPATIENT
Start: 2024-02-25

## 2024-02-24 RX ADMIN — LANSOPRAZOLE 15 MG: 15 TABLET, ORALLY DISINTEGRATING, DELAYED RELEASE ORAL at 06:55

## 2024-02-24 RX ADMIN — HYDROXYZINE HYDROCHLORIDE 50 MG: 25 TABLET, FILM COATED ORAL at 20:13

## 2024-02-24 RX ADMIN — TRAZODONE HYDROCHLORIDE 50 MG: 50 TABLET ORAL at 20:13

## 2024-02-24 RX ADMIN — BUPRENORPHINE AND NALOXONE 1 FILM: 8; 2 FILM, SOLUBLE BUCCAL; SUBLINGUAL at 09:13

## 2024-02-24 RX ADMIN — VENLAFAXINE HYDROCHLORIDE 150 MG: 150 CAPSULE, EXTENDED RELEASE ORAL at 09:09

## 2024-02-24 RX ADMIN — ENOXAPARIN SODIUM 40 MG: 100 INJECTION SUBCUTANEOUS at 09:13

## 2024-02-24 RX ADMIN — QUETIAPINE FUMARATE 50 MG: 25 TABLET ORAL at 20:13

## 2024-02-24 RX ADMIN — SODIUM CHLORIDE: 9 INJECTION, SOLUTION INTRAVENOUS at 06:56

## 2024-02-24 ASSESSMENT — PATIENT HEALTH QUESTIONNAIRE - PHQ9
3. TROUBLE FALLING OR STAYING ASLEEP: 2
2. FEELING DOWN, DEPRESSED OR HOPELESS: 3
9. THOUGHTS THAT YOU WOULD BE BETTER OFF DEAD, OR OF HURTING YOURSELF: 1
4. FEELING TIRED OR HAVING LITTLE ENERGY: 2
7. TROUBLE CONCENTRATING ON THINGS, SUCH AS READING THE NEWSPAPER OR WATCHING TELEVISION: 1
SUM OF ALL RESPONSES TO PHQ QUESTIONS 1-9: 15
5. POOR APPETITE OR OVEREATING: 0
1. LITTLE INTEREST OR PLEASURE IN DOING THINGS: 3
SUM OF ALL RESPONSES TO PHQ QUESTIONS 1-9: 15
SUM OF ALL RESPONSES TO PHQ9 QUESTIONS 1 & 2: 6
8. MOVING OR SPEAKING SO SLOWLY THAT OTHER PEOPLE COULD HAVE NOTICED. OR THE OPPOSITE, BEING SO FIGETY OR RESTLESS THAT YOU HAVE BEEN MOVING AROUND A LOT MORE THAN USUAL: 1
SUM OF ALL RESPONSES TO PHQ QUESTIONS 1-9: 14
6. FEELING BAD ABOUT YOURSELF - OR THAT YOU ARE A FAILURE OR HAVE LET YOURSELF OR YOUR FAMILY DOWN: 2
SUM OF ALL RESPONSES TO PHQ QUESTIONS 1-9: 15

## 2024-02-24 ASSESSMENT — PAIN SCALES - GENERAL
PAINLEVEL_OUTOF10: 0
PAINLEVEL_OUTOF10: 4

## 2024-02-24 ASSESSMENT — SLEEP AND FATIGUE QUESTIONNAIRES
DO YOU HAVE DIFFICULTY SLEEPING: YES
AVERAGE NUMBER OF SLEEP HOURS: 7
DO YOU USE A SLEEP AID: NO
SLEEP PATTERN: DIFFICULTY FALLING ASLEEP

## 2024-02-24 ASSESSMENT — PAIN DESCRIPTION - LOCATION: LOCATION: RIB CAGE

## 2024-02-24 ASSESSMENT — PAIN DESCRIPTION - PAIN TYPE: TYPE: CHRONIC PAIN

## 2024-02-24 ASSESSMENT — PAIN DESCRIPTION - ORIENTATION: ORIENTATION: RIGHT

## 2024-02-24 NOTE — PROGRESS NOTES
Hospitalist Progress Note    Patient:  Cedric ARMIJO [facility]      Unit/Bed:5K-04/004-A    YOB: 1985    MRN: [id number]       Acct: [id number]     PCP: Santosh Zepeda MD    Date of Admission: 2/21/2024    Assessment/Plan:    Acute Hypoxic Respiratory Failure secondary to drug overdose:  Intubated 2/21 to 2/22 for airway protection in setting of benzo overdose  Tolerated well.   Currently on room air satting 95%  Intentional Drug Overdose with chronic polysubstance abuse:   Reports of witnessed suicidal attempt. 20 pills of 0.5 Mg Xanax and methamphetamines. Confirmed on UDS. Poison control following.   Psychiatry following- admit to  once medically stable.    Monitor telemetry, EKG in AM.  Opioid use disorder  Prescribed suboxone in the outpatient setting according to PDMP- actively withdrawals.  Resume suboxone.   Fever - most likely to opioid withdrawal- resolved  102.6 T max--- over 24 hours without fever   Lactic acid normal. Blood cultures negative to date   Urine negative on admit.  No leukocytosis  RUSLAN- resolved:   Cr 2.9, GFR 27 on arrival. Likely 2/2 benzo overdose and ATN.  Cr  0.9  Lactic Acidosis: Resolved  Acute Leukocytosis: Resolved. Likely reactive  History of Hep C - reports he was treated several years ago at [facility]   GERD: Continue PPI  Tobacco Abuse: Current smoker, 2 PPD.  Will  on smoking cessation when appropriate  Hx of Psychosis: Continue home Seroquel and Effexor.  Psychiatry consulted      Expected discharge date:  OK to DC to 4E later today if Hgb stable at     Disposition:    [] Home       [] TCU       [] Rehab       [] Psych       [] SNF       [] Long Term Care Facility       [x] Other- 4E     Chief Complaint: Drug overdose     Hospital Course:   Initial HPI Per chart review  \"The patient is a 38-year-old male with a PMH of hep C, GERD, tobacco use, multidrug abuse and history of psychosis who presented to  ED 2/21/2022 following a suicide  ([facility]) [N17.9] 02/23/2024    Drug overdose, intentional self-harm, initial encounter ([facility]) [T50.902A] 02/21/2024       Electronically signed by Leeann Thurman PA-C on 2/24/2024 at 1:23 PM

## 2024-02-24 NOTE — H&P
capsule by mouth daily (with breakfast)  QUEtiapine (SEROQUEL) 50 MG tablet, Take 1 tablet by mouth nightly  traZODone (DESYREL) 50 MG tablet, Take 1 tablet by mouth nightly as needed for Sleep  pantoprazole (PROTONIX) 40 MG tablet, Take 1 tablet by mouth every morning (before breakfast)  Allergies:  Asa [aspirin] and Ibuprofen      Family History:       Problem Relation Age of Onset    No Known Problems Mother     Alcohol Abuse Father     Liver Disease Father      Psychiatric Family History  Patient is uncertain of psychiatric family history    REVIEW OF SYSTEMS:  CONSTITUTIONAL:  negative  EYES:  negative  HEENT:  negative  RESPIRATORY:  negative  CARDIOVASCULAR:  negative  GASTROINTESTINAL:  negative  GENITOURINARY:  negative  INTEGUMENT/BREAST:  negative    PHYSICAL EXAM:    Vitals:  /72   Pulse 68   Temp (!) 96.5 °F (35.8 °C) (Oral)   Resp 16   Ht 1.753 m (5' 9\")   Wt 93 kg (205 lb)   SpO2 99%   BMI 30.27 kg/m²     Mental Status Examination:  Level of consciousness:  within normal limits  Appearance:  well-appearing  Behavior/Motor:  psychomotor retardation  Attitude toward examiner:  cooperative and attentive  Speech:  spontaneous  Mood:  depressed  Affect:  mood congruent  Thought processes:  linear and goal directed  Thought content:  Homocidal ideation denies  Suicidal Ideation:  with plan to overdose on pills  Delusions:  no evidence of delusions  Perceptual Disturbance:  denies any perceptual disturbance  Cognition:  oriented to person, place, and time  Concentration succeeded  Memory intact  Insight:  impaired  Judgment:  I'm[aired            DSM-IV DIAGNOSIS:    MDD recurrent severe without psychosis  Polysubstance abuse(benzo's methamphetamines cannabis)      ASSESSMENT AND PLAN:    Admit to psych unit  Will resume and adjust medications accordingly    Disposition:    Patient to be admitted to the hospital.    Reason for Admission to Psychiatric Unit:  Threat to self requiring 24 hour

## 2024-02-24 NOTE — PROGRESS NOTES
Pt was in bed as a sitter was with him. He was dealing with Drug overdose intentional self-harm. He was encouraged and blessed.    02/23/24 2038   Encounter Summary   Encounter Overview/Reason  Initial Encounter   Service Provided For: Patient   Referral/Consult From: [facility]   Support System Family members   Last Encounter  02/23/24   Complexity of Encounter Low   Begin Time 1330   End Time  1336   Total Time Calculated 6 min   Spiritual/Emotional needs   Type Spiritual Support   Assessment/Intervention/Outcome   Assessment Calm   Intervention Empowerment   Outcome Encouraged

## 2024-02-24 NOTE — PROGRESS NOTES
Behavioral Health   Admission Note   Admission Type: Voluntary    Reason for Admission: \"Get help\"    Patient Strengths/Barriers  Strengths (Must Choose Two): Independent living, Support from family, Sense of humor  Barriers: Support of organized community, Recreational/leisure/hobbies    Addictive Behavior  In the Past 3 Months, Have You Felt or Has Someone Told You That You Have a Problem With  : None    Medical Problems:   Past Medical History:   Diagnosis Date    Hep C w/ coma, chronic     Hypertension        Status EXAM:  Mental Status and Behavioral Exam  Normal: Yes  Level of Assistance: Independent/Self  Facial Expression: Brightened, Flat  Affect: Blunt  Level of Consciousness: Alert  Frequency of Checks: 4 times per hour, close  Mood:Normal: No  Mood: Depressed, Anxious, Sad  Motor Activity:Normal: Yes  Eye Contact: Good  Observed Behavior: Cooperative, Friendly  Sexual Misconduct History: Current - no  Preception: [place] to person, [place] to time, [place] to place, [place] to situation  Attention:Normal: No  Attention: Distractible, Unable to concentrate  Thought Processes: Unremarkable  Thought Content:Normal: Yes  Depression Symptoms: Impaired concentration, Feelings of worthlessness  Anxiety Symptoms: Generalized  Nadia Symptoms: No problems reported or observed.  Hallucinations: None  Delusions: No  Memory:Normal: Yes  Insight and Judgment: No  Insight and Judgment: Poor judgment    Pt admitted with followings belongings:  Dental Appliances: None  Vision - Corrective Lenses: None  Hearing Aid: None  Jewelry: Body Piercing (nose ring)  Body Piercings Removed: No  Clothing: Footwear, Pants, Shirt, Socks, Sweater, At bedside, Undergarments  Other Valuables: Cigarettes, Lighter/Matches, Wallet, Other (Comment) (#[id number], misc credit cards)     Admission order obtained Yes  Belongings locked on unit. Valuables placed in safe in security envelope, number:  [id number]. Patient's home medications were left at

## 2024-02-24 NOTE — PROGRESS NOTES
Reinforced suicide precautions with patient.  Reinforced that visitors will be screened and may be limited at the discretion of the nurse.  Visitor belongings are subject to be searched.  Belongings may not be allowed into the patient room.    Reviewed any personal belongings from the previous shift believed to be a threat to patient safety removed from room.      Room screened for safety, items removed to create a safe environment include:   Blood pressure cuff   Loose or extra cords, tubing (not of medical necessity)   Extra Linens   Telephone   Toiletries   Trash Liners   Patient's cell phone and charging cord (must be removed from room)      Safety tray ordered: yes    Expectations were discussed with gabriella English(unit support aide).  Sitter positioned near exit.  Sitter reminded that patient should be observed at all times including toileting and bathing.  [place] has  documentation sheet.    Patient and sitter included in hourly rounds.

## 2024-02-24 NOTE — PLAN OF CARE
Problem: Discharge Planning  Goal: Discharge to home or other facility with appropriate resources  2/24/2024 0332 by Zaida Amador RN  Outcome: Progressing  Flowsheets (Taken 2/24/2024 0324)  Discharge to home or other facility with appropriate resources:   Identify barriers to discharge with patient and caregiver   Arrange for needed discharge resources and transportation as appropriate   Identify discharge learning needs (meds, wound care, etc)   Refer to discharge planning if patient needs post-hospital services based on physician order or complex needs related to functional status, cognitive ability or social support system   Discharge plan is for placement. Awaiting for further discharge instructions and goals.     Problem: Pain  Goal: Verbalizes/displays adequate comfort level or baseline comfort level  2/24/2024 0332 by Zaida Amador RN  Outcome: Progressing  Flowsheets (Taken 2/23/2024 2320)  Verbalizes/displays adequate comfort level or baseline comfort level:   Encourage patient to monitor pain and request assistance   Assess pain using appropriate pain scale   Administer analgesics based on type and severity of pain and evaluate response   Implement non-pharmacological measures as appropriate and evaluate response   Notify Licensed Independent Practitioner if interventions unsuccessful or patient reports new pain     Problem: Neurosensory - Adult  Goal: Achieves stable or improved neurological status  2/24/2024 0332 by Zaida Amador RN  Outcome: Progressing  Flowsheets (Taken 2/24/2024 0324)  Achieves stable or improved neurological status:   Assess for and report changes in neurological status   Initiate measures to prevent increased intracranial pressure   Maintain blood pressure and fluid volume within ordered parameters to optimize cerebral perfusion and minimize risk of hemorrhage   Monitor temperature, glucose, and sodium. Initiate appropriate interventions as ordered    RN  Outcome: Progressing  Flowsheets (Taken 2/24/2024 0324)  Absence of urinary retention:   Assess patient’s ability to void and empty bladder   Monitor intake/output and perform bladder scan as needed     Problem: Metabolic/Fluid and Electrolytes - Adult  Goal: Hemodynamic stability and optimal renal function maintained  2/24/2024 0332 by Zaida Amador RN  Outcome: Progressing  Flowsheets (Taken 2/24/2024 0324)  Hemodynamic stability and optimal renal function maintained:   Monitor labs and assess for signs and symptoms of volume excess or deficit   Monitor intake, output and patient weight   Monitor response to interventions for patient's volume status, including labs, urine output, blood pressure (other measures as available)   Encourage oral intake as appropriate   Instruct patient on fluid and nutrition restrictions as appropriate     Problem: Self Harm/Suicidality  Goal: Will have no self-injury during hospital stay  Description: INTERVENTIONS:  12/24/2024 0332 by Zaida Amador RN  Outcome: Progressing  Flowsheets (Taken 2/24/2024 0324)  Will have no self-injury during hospital stay:   Ensure constant observer at bedside with Q15M safety checks   Maintain a safe environment   Secure patient belongings   Ensure family/visitors adhere to safety recommendations   Ensure safety tray has been added to patient's diet order   Every shift and PRN: Re-assess suicidal risk via Frequent Screener   Care plan reviewed with patient.  Patient verbalize understanding of the plan of care and contribute to goal setting.

## 2024-02-24 NOTE — FLOWSHEET NOTE
02/24/24 0454   Treatment Team Notification   Reason for Communication Abnormal vitals  (patient has episodes of bradycardia(44-59bpm), please refer to EKG strip. Other vitals are stable. Patient is asymptomatic and not in any form of distress)   Name of Team Member Notified Molly Issa   Treatment Team Role Advanced Practice Nurse   Method of Communication Secure Message   Response Waiting for response   Notification Time 6671

## 2024-02-25 PROBLEM — F33.2 SEVERE EPISODE OF RECURRENT MAJOR DEPRESSIVE DISORDER, WITHOUT PSYCHOTIC FEATURES (HCC): Status: ACTIVE | Noted: 2024-02-25

## 2024-02-25 PROBLEM — F19.10 POLYSUBSTANCE ABUSE (HCC): Status: ACTIVE | Noted: 2024-02-25

## 2024-02-25 LAB
BACTERIA BLD AEROBE CULT: NORMAL
BACTERIA BLD AEROBE CULT: NORMAL

## 2024-02-25 PROCEDURE — 99231 SBSQ HOSP IP/OBS SF/LOW 25: CPT | Performed by: NURSE PRACTITIONER

## 2024-02-25 PROCEDURE — 1240000000 HC EMOTIONAL WELLNESS R&B

## 2024-02-25 PROCEDURE — 6370000000 HC RX 637 (ALT 250 FOR IP): Performed by: PHYSICIAN ASSISTANT

## 2024-02-25 PROCEDURE — 6370000000 HC RX 637 (ALT 250 FOR IP): Performed by: PSYCHIATRY & NEUROLOGY

## 2024-02-25 RX ADMIN — ACETAMINOPHEN 650 MG: 325 TABLET ORAL at 20:48

## 2024-02-25 RX ADMIN — HYDROXYZINE HYDROCHLORIDE 50 MG: 25 TABLET, FILM COATED ORAL at 20:48

## 2024-02-25 RX ADMIN — TRAZODONE HYDROCHLORIDE 50 MG: 50 TABLET ORAL at 20:48

## 2024-02-25 RX ADMIN — VENLAFAXINE HYDROCHLORIDE 150 MG: 150 CAPSULE, EXTENDED RELEASE ORAL at 16:29

## 2024-02-25 RX ADMIN — LANSOPRAZOLE 15 MG: 15 TABLET, ORALLY DISINTEGRATING, DELAYED RELEASE ORAL at 16:29

## 2024-02-25 RX ADMIN — QUETIAPINE FUMARATE 50 MG: 25 TABLET ORAL at 20:47

## 2024-02-25 RX ADMIN — BUPRENORPHINE AND NALOXONE 1 FILM: 8; 2 FILM, SOLUBLE BUCCAL; SUBLINGUAL at 16:29

## 2024-02-25 ASSESSMENT — PAIN DESCRIPTION - LOCATION: LOCATION: GENERALIZED

## 2024-02-25 ASSESSMENT — PAIN DESCRIPTION - ONSET: ONSET: ON-GOING

## 2024-02-25 ASSESSMENT — PAIN DESCRIPTION - DESCRIPTORS: DESCRIPTORS: ACHING

## 2024-02-25 ASSESSMENT — PAIN SCALES - GENERAL
PAINLEVEL_OUTOF10: 0
PAINLEVEL_OUTOF10: 4
PAINLEVEL_OUTOF10: 0

## 2024-02-25 ASSESSMENT — PAIN DESCRIPTION - ORIENTATION: ORIENTATION: OTHER (COMMENT)

## 2024-02-25 ASSESSMENT — PAIN DESCRIPTION - PAIN TYPE: TYPE: CHRONIC PAIN

## 2024-02-25 ASSESSMENT — PAIN - FUNCTIONAL ASSESSMENT: PAIN_FUNCTIONAL_ASSESSMENT: ACTIVITIES ARE NOT PREVENTED

## 2024-02-25 ASSESSMENT — PAIN DESCRIPTION - FREQUENCY: FREQUENCY: INTERMITTENT

## 2024-02-25 NOTE — PLAN OF CARE
Problem: Pain  Goal: Verbalizes/displays adequate comfort level or baseline comfort level  2/25/2024 1000 by Irina Anguiano RN  Outcome: Progressing  Note: Denies any pain at this time.     Problem: Discharge Planning  Goal: Discharge to home or other facility with appropriate resources  2/25/2024 1000 by Irina Anguiano RN  Outcome: Progressing  Flowsheets (Taken 2/25/2024 0934)  Discharge to home or other facility with appropriate resources: Identify barriers to discharge with patient and caregiver  Note: Discharge planning in process.      Problem: Self Harm/Suicidality  Goal: Will have no self-injury during hospital stay  Description: INTERVENTIONS:  1.  Ensure constant observer at bedside with Q15M safety checks  2.  Maintain a safe environment  3.  Secure patient belongings  4.  Ensure family/visitors adhere to safety recommendations  5.  Ensure safety tray has been added to patient's diet order  6.  Every shift and PRN: Re-assess suicidal risk via Frequent Screener    2/25/2024 1000 by Irina Anguiano RN  Outcome: Progressing  Flowsheets (Taken 2/25/2024 0934)  Will have no self-injury during hospital stay:   Maintain a safe environment   Secure patient belongings  Note: Patient denies suicidal ideations, denies plan or intent to harm self. Patient remains on suicidal precautions 15 checks for safety. Instructed to seek staff as needed for thoughts of self harm.       Problem: Depression  Goal: Will be euthymic at discharge  Description: INTERVENTIONS:  1. Administer medication as ordered  2. Provide emotional support via 1:1 interaction with staff  3. Encourage involvement in milieu/groups/activities  4. Monitor for social isolation  2/25/2024 1000 by Irina Anguiano RN  Outcome: Progressing  Note: Patient reports mood 5/10 with 10 being normal. Has blunt affect. Speech is clear and relevant. Good eye contact. Reports having hope for future and identifies family as their support

## 2024-02-25 NOTE — PROGRESS NOTES
Physician Progress Note      PATIENT:               MAR LACKEY  CSN #:                  [id number]  :                       1985  ADMIT DATE:       2024 6:25 PM  DISCH DATE:        2024 3:58 PM  RESPONDING  PROVIDER #:        Leeann Thurman PA-C          QUERY TEXT:    Leeann,    Patient admitted with OD. Noted documentation of intubation for airway   protection & and acute respiratory failure. Per ED: close to unresponsive.  It   took very painful stimuli to get him to respond and we are worried that he   could potentially vomit into his airway and we think he needs his airway   protected. Per HP-He was intubated 2004 for respiratory failure and   airway protection. Please indicate one of the following and document in the   medical record:    The medical record reflects the following:  Risk Factors: OD  Clinical Indicators: Noted documentation of intubation for airway protection &   and acute respiratory failure. Per ED: close to unresponsive.  It took very   painful stimuli to get him to respond and we are worried that he could   potentially vomit into his airway and we think he needs his airway protected.   Per HP-He was intubated 2004 for respiratory failure and airway   protection.  Treatment: intubation    Mary Lcuas BSN, RN  Options provided:  -- Intubated for Acute Respiratory Failure as evidenced by, Please document   evidence.  -- Intubated for airway protection only, Acute Respiratory Failure ruled out   after study  -- Other - I will add my own diagnosis  -- Disagree - Not applicable / Not valid  -- Disagree - Clinically unable to determine / Unknown  -- Refer to Clinical Documentation Reviewer    PROVIDER RESPONSE TEXT:    Intubated for airway protection due to overdose and significant somnolence    Query created by: Mely Lucas on 2024 8:05 AM      Electronically signed by:  Leeann Thurman PA-C 2024 8:12 AM

## 2024-02-25 NOTE — PROGRESS NOTES
Psychiatry Progress Note      2-    CC: Suicide attempt by OD on Xanax                   Subjective    Progress:  Cedric reports feeling much better since admission. States he only Rodney on Xanax because he was \"high\" on meth. Denies feelings of harm towards self or others currently. Denies having depression. Reporrts meds are working well. Denies having side effects. Good med compliance is verified. Reports appetite and sleep are improving. Verified slept 8.5 hours continuous last night.  Denies getting any visits or talking to anyone on phone. States he plans to get control of his meth addiction.       Objective  /68   Pulse 66   Temp 98.6 °F (37 °C) (Oral)   Resp 16   Ht 1.753 m (5' 9\")   Wt 93 kg (205 lb)   SpO2 99%   BMI 30.27 kg/m²      MSE:  Level of consciousness: Alert  Appearance: hospital attire, in chair and fair grooming   Behavior/Motor: no abnormalities noted   Attitude toward examiner: cooperative   Speech: Normal volume, goal directed, NRR  Mood: Euthymic  Affect: Reactive  Thought processes: Linear and goal directed   Suicidal Ideation: Denies suicidal ideations  Homicidal ideation: Denies homicidal ideations  Delusions: No evidence of delusions is observed  Perceptual Disturbance: Denies AH/VH  Cognition: Oriented to person, place, time and situation   Concentration fair   Memory intact   Insight: Limited  Judgment: Limited    Assessment:  MDD 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      Wyatt Cooper, CNP  2-         MDD recurrent severe without psychosis  Polysubstance abuse(benzo's methamphetamines cannabis)    I concur with above clinical findings and plan of care

## 2024-02-25 NOTE — PLAN OF CARE
Problem: Pain  Goal: Verbalizes/displays adequate comfort level or baseline comfort level  Outcome: Progressing  Flowsheets (Taken 2/24/2024 2051)  Verbalizes/displays adequate comfort level or baseline comfort level:   Encourage patient to monitor pain and request assistance   Administer analgesics based on type and severity of pain and evaluate response   Assess pain using appropriate pain scale     Problem: Discharge Planning  Goal: Discharge to home or other facility with appropriate resources  Outcome: Progressing  Flowsheets (Taken 2/24/2024 2051)  Discharge to home or other facility with appropriate resources:   Identify barriers to discharge with patient and caregiver   Identify discharge learning needs (meds, wound care, etc)   Arrange for needed discharge resources and transportation as appropriate  Note: Patient reports he will return home after discharge and follow up with his family doctor at [facility].     Problem: Self Harm/Suicidality  Goal: Will have no self-injury during hospital stay  Description: INTERVENTIONS:  1.  Ensure constant observer at bedside with Q15M safety checks  2.  Maintain a safe environment  3.  Secure patient belongings  4.  Ensure family/visitors adhere to safety recommendations  5.  Ensure safety tray has been added to patient's diet order  6.  Every shift and PRN: Re-assess suicidal risk via Frequent Screener    Outcome: Progressing  Flowsheets (Taken 2/24/2024 2051)  Will have no self-injury during hospital stay: Maintain a safe environment  Note: No self harm behaviors were observed or reported so far this shift. Remains on every 15 minutes precautions for safety.  Patient denies suicidal ideations at present time       Problem: Depression  Goal: Will be euthymic at discharge  Description: INTERVENTIONS:  1. Administer medication as ordered  2. Provide emotional support via 1:1 interaction with staff  3. Encourage involvement in milieu/groups/activities  4.

## 2024-02-26 VITALS
TEMPERATURE: 98.2 F | HEART RATE: 70 BPM | SYSTOLIC BLOOD PRESSURE: 127 MMHG | RESPIRATION RATE: 16 BRPM | WEIGHT: 205 LBS | OXYGEN SATURATION: 99 % | HEIGHT: 69 IN | DIASTOLIC BLOOD PRESSURE: 72 MMHG | BODY MASS INDEX: 30.36 KG/M2

## 2024-02-26 PROBLEM — F33.0 MDD (MAJOR DEPRESSIVE DISORDER), RECURRENT EPISODE, MILD (HCC): Status: RESOLVED | Noted: 2024-02-24 | Resolved: 2024-02-26

## 2024-02-26 PROCEDURE — 6370000000 HC RX 637 (ALT 250 FOR IP): Performed by: PHYSICIAN ASSISTANT

## 2024-02-26 PROCEDURE — 5130000000 HC BRIDGE APPOINTMENT

## 2024-02-26 PROCEDURE — 99239 HOSP IP/OBS DSCHRG MGMT >30: CPT | Performed by: PSYCHIATRY & NEUROLOGY

## 2024-02-26 RX ORDER — VENLAFAXINE HYDROCHLORIDE 150 MG/1
150 CAPSULE, EXTENDED RELEASE ORAL
Qty: 30 CAPSULE | Refills: 0 | Status: SHIPPED | OUTPATIENT
Start: 2024-02-26

## 2024-02-26 RX ORDER — HYDROXYZINE 50 MG/1
50 TABLET, FILM COATED ORAL 3 TIMES DAILY PRN
Qty: 90 TABLET | Refills: 0 | Status: SHIPPED | OUTPATIENT
Start: 2024-02-26 | End: 2024-03-27

## 2024-02-26 RX ORDER — QUETIAPINE FUMARATE 50 MG/1
50 TABLET, FILM COATED ORAL NIGHTLY
Qty: 30 TABLET | Refills: 0 | Status: SHIPPED | OUTPATIENT
Start: 2024-02-26

## 2024-02-26 RX ADMIN — BUPRENORPHINE AND NALOXONE 1 FILM: 8; 2 FILM, SOLUBLE BUCCAL; SUBLINGUAL at 08:26

## 2024-02-26 RX ADMIN — LANSOPRAZOLE 15 MG: 15 TABLET, ORALLY DISINTEGRATING, DELAYED RELEASE ORAL at 08:26

## 2024-02-26 RX ADMIN — VENLAFAXINE HYDROCHLORIDE 150 MG: 150 CAPSULE, EXTENDED RELEASE ORAL at 08:26

## 2024-02-26 ASSESSMENT — PAIN SCALES - GENERAL: PAINLEVEL_OUTOF10: 0

## 2024-02-26 NOTE — PROGRESS NOTES
Behavioral Services  Medicare Certification Upon Admission    I certify that this patient's inpatient psychiatric hospital admission is medically necessary for:    [x] (1) Treatment which could reasonably be expected to improve this patient's condition,       [x] (2) Or for diagnostic study;     AND     [x](2) The inpatient psychiatric services are provided while the individual is under the care of a physician and are included in the individualized plan of care.    Estimated length of stay/service 3-5 days    Plan for post-hospital care hc    Electronically signed by Rayo Bennett MD on 2/26/2024 at 7:31 AM

## 2024-02-26 NOTE — DISCHARGE INSTR - COC
Continuity of Care Form    Patient Name: Cedric Galan   :  1985  MRN:  [id number]    Admit date:  2024  Discharge date:  ***    Code Status Order: Full Code   Advance Directives:     Admitting Physician:  Salima Bunch MD  PCP: Dede Carrasco APRN - CNP    Discharging Nurse: ***  Discharging Hospital Unit/Room#: 4E-58/058-B  Discharging Unit Phone Number: ***    Emergency Contact:   Extended Emergency Contact Information  Primary Emergency Contact: Guera Galan  Home Phone: [phone]  Relation: Brother/Sister    Past Surgical History:  Past Surgical History:   Procedure Laterality Date    ARM SURGERY Left 10/26/2020    EXCISION LEFT LOWER FOREARM LIPOMA performed by Val Casiano MD at [facility] OR    BACK SURGERY N/A 2022    EXCISION LIPOMA LOWER BACK performed by Val Casiano MD at [facility] OR    ORBITAL RIM RECONSTRUCTION      SHOULDER CLOSED REDUCTION         Immunization History:   Immunization History   Administered Date(s) Administered    COVID-19, J&J, (age 18y+), IM, 0.5 mL 2021       Active Problems:  Patient Active Problem List   Diagnosis Code    Lipoma of back D17.1    Acute psychosis ([facility]) F23    Drug overdose, intentional self-harm, initial encounter ([facility]) T50.902A    RUSLAN (acute kidney injury) ([facility]) N17.9    Severe episode of recurrent major depressive disorder, without psychotic features ([facility]) F33.2    Polysubstance abuse ([facility]) F19.10       Isolation/Infection:   Isolation            No Isolation          Patient Infection Status       None to display                     Nurse Assessment:  Last Vital Signs: /72   Pulse 70   Temp 98.2 °F (36.8 °C) (Oral)   Resp 16   Ht 1.753 m (5' 9\")   Wt 93 kg (205 lb)   SpO2 99%   BMI 30.27 kg/m²     Last documented pain score (0-10 scale): Pain Level: 0  Last Weight:   Wt Readings from Last 1 Encounters:   24 93 kg (205 lb)     Mental Status:  {IP PT MENTAL STATUS:}    IV Access:  {[facility]

## 2024-02-26 NOTE — DISCHARGE INSTR - DIET

## 2024-02-26 NOTE — DISCHARGE INSTRUCTIONS
[facility] Hotline:  [phone]    Crisis phone numbers:  [facility], and [facility] [phone].  [facility], and [facility] [phone]  [facility] [phone].  [facility] [phone].  [facility] [phone].  [facility], [facility] [phone].    [facility]  [address], [place] [address]  [phone]    [facility] [place] [facility]  [address] [facility]  [address] [place], [place] [address]  [place], [place] [id number] [phone]    [facility]  [address] [address] [address] [facility] [place] [address]  [phone]    [facility]  [address] [place] [address]  [phone]    [facility]  [address] [place] [address]  [phone]    [facility]  [address], [place] [id number] [phone]    [facility]  [address] [place] [address]  [phone]    [facility]  [address] [place], [place] [id number] [phone]    [facility]  [address] [address]  [phone]    [address] [address]  [phone]    [facility]  [address] [place], [place] [address] [phone]    [facility]  [address] [place] [address]  [phone]    [facility]  [address] [place], [place] [address] [phone]

## 2024-02-26 NOTE — TRANSITION OF CARE
offered information on these advance directives patient declined to complete     Patient Instructions: Please continue all medications until otherwise directed by physician.  Keep follow up appointment     Tobacco Cessation Discharge Plan:   Is the patient a tobacco user  and needs referral for tobacco cessation? No  Patient referred to the following for tobacco cessation with an appointment? Patient refused  Patient was offered medication to assist with tobacco cessation at discharge? Patient refused    Alcohol/Substance Abuse Discharge Plan:   Does the patient have a history of substance/alcohol abuse and requires a referral for treatment? No  Patient referred to the following for substance/alcohol abuse treatment with an appointment? Patient refused  Patient was offered medication to assist with alcohol cessation at discharge? Patient refused      Patient discharged to: Home; discussed with patient/caregiver

## 2024-02-26 NOTE — BH NOTE
Behavioral Health   Discharge Note    Pt discharged with followings belongings:   Dental Appliances: None  Vision - Corrective Lenses: None  Hearing Aid: None  Jewelry: Body Piercing (nose ring)  Body Piercings Removed: No  Clothing: Footwear, Pants, Shirt, Socks, Sweater, At bedside, Undergarments  Other Valuables: Cigarettes, Lighter/Matches, Wallet, Other (Comment) (#[id number], misc credit cards)   Valuables retrieved from safe, security envelope number:  [id number] and returned to patient.  Patient left department with staff via ambulatory.  Discharged to home. \"An Important Message from Medicare About Your Rights\" (IMM) form photocopy original from admission and provided to pt at least 4 hours prior to discharge N/A. \"An Important Message from [employer] About Your Rights\" (IMM) form photocopy original from admission. N/A. If pt left within 4 hours of receiving 2nd delivery of IMM, this is because pt was agreeable with hospital discharge.  Patient/guardian education on aftercare instructions: Yes  Bridge appointment completed:  yes.  Reviewed Discharge Instructions with patient/family/nursing facility.  Patient/family verbalizes understanding and agreement with the discharge plan using the teachback method.   Information faxed to n/a by n/a Patient/family verbalize understanding of AVS:Yes    Status EXAM upon discharge:  Mental Status and Behavioral Exam  Normal: Yes  Level of Assistance: Independent/Self  Facial Expression: Brightened  Affect: Incongruent  Level of Consciousness: Alert  Frequency of Checks: 4 times per hour, close  Mood:Normal: No  Mood: Anxious, Depressed (affect incongruent with mood)  Motor Activity:Normal: No  Motor Activity: Decreased  Eye Contact: Fair  Observed Behavior: Cooperative, Friendly  Sexual Misconduct History: Current - no  Preception: [place] to person, [place] to time, [place] to place, [place] to situation  Attention:Normal: Yes  Attention: Unable to concentrate  Thought

## 2024-02-26 NOTE — PROGRESS NOTES
Cedric Galan attended the Sunday Buddhist service.   He was respectful and attentive and contributed to group discussion.

## 2024-02-26 NOTE — PROGRESS NOTES
This RN has reviewed and agrees with RE Herr LPN's data collection and has collaborated with this LPN regarding the patient's care plan.

## 2024-02-26 NOTE — PLAN OF CARE
Problem: Pain  Goal: Verbalizes/displays adequate comfort level or baseline comfort level  Outcome: Progressing  Flowsheets (Taken 2/26/2024 0054)  Verbalizes/displays adequate comfort level or baseline comfort level: Assess pain using appropriate pain scale  Note: Patient states generalized pain of a 4. Tylenol given with relief.     Problem: Discharge Planning  Goal: Discharge to home or other facility with appropriate resources  Outcome: Progressing  Flowsheets  Taken 2/26/2024 0055  Discharge to home or other facility with appropriate resources: Arrange for needed discharge resources and transportation as appropriate  Taken 2/26/2024 0032  Discharge to home or other facility with appropriate resources: Identify barriers to discharge with patient and caregiver  Note: Patient states he will return home with his grandparents at discharge.     Problem: Self Harm/Suicidality  Goal: Will have no self-injury during hospital stay  Description: INTERVENTIONS:  1.  Ensure constant observer at bedside with Q15M safety checks  2.  Maintain a safe environment  3.  Secure patient belongings  4.  Ensure family/visitors adhere to safety recommendations  5.  Ensure safety tray has been added to patient's diet order  6.  Every shift and PRN: Re-assess suicidal risk via Frequent Screener    Outcome: Progressing  Flowsheets  Taken 2/26/2024 0055  Will have no self-injury during hospital stay: Maintain a safe environment  Taken 2/26/2024 0032  Will have no self-injury during hospital stay: Maintain a safe environment  Note: No self harm noted so far this shift. Patient denies any suicidal thoughts at present.     Problem: Depression  Goal: Will be euthymic at discharge  Description: INTERVENTIONS:  1. Administer medication as ordered  2. Provide emotional support via 1:1 interaction with staff  3. Encourage involvement in milieu/groups/activities  4. Monitor for social isolation  Outcome: Progressing  Note: Patient states he

## 2024-02-27 NOTE — DISCHARGE SUMMARY
contact  Speech:  spontaneous, normal rate, normal volume and well articulated  Mood:  euthymic  Affect:  Full range  Thought processes:  linear, goal directed and coherent  Thought content:  denies homicidal ideation  Suicidal Ideation:  denies suicidal ideation  Delusions:  no evidence of delusions  Perceptual Disturbance:  denies any perceptual disturbance  Cognition:  Intact  Memory: age appropriate  Insight & Judgement: fair  Medication side effects: denies     Disposition: home    Patient Instructions:   Activity: activity as tolerated  1. Patient instructed to take medications regularly and follow up with outpatient appointments.     Follow-up as scheduled with [facility]       Signed:    Electronically signed by Rayo Bennett MD on 2/26/24 at 8:19 PM EST    Time Spent on discharge is more than 33 minutes in the examination, evaluation, counseling and review of medications and discharge plan.     Patient is evaluated by Tristan Hutchison PA-C on the unit in person and I evaluated patient as Tele visit.    Cedric Galan is a 38 y.o. male being evaluated by a Virtual Visit (video visit) encounter to address concerns as mentioned above.  A caregiver was present in the room along with the patient. This Virtual Visit was conducted with patient's consent. The patient is located in a state where I am licensed to provide care.   Patient is present at [facility], [facility] and I am physically present at [place], [place]    --Rayo Bennett MD on 2/26/2024 at 8:19 PM    An electronic signature was used to authenticate this note.     **This report has been created using voice recognition software. It may contain minor errors which are inherent in voice recognition technology.**

## 2024-02-28 LAB
BACTERIA BLD AEROBE CULT: NORMAL
BACTERIA BLD AEROBE CULT: NORMAL

## 2024-11-27 ENCOUNTER — HOSPITAL ENCOUNTER (EMERGENCY)
Age: 39
Discharge: HOME | End: 2024-11-27
Attending: EMERGENCY MEDICINE
Payer: COMMERCIAL

## 2024-11-27 VITALS
DIASTOLIC BLOOD PRESSURE: 100 MMHG | HEART RATE: 109 BPM | TEMPERATURE: 98.4 F | SYSTOLIC BLOOD PRESSURE: 137 MMHG | OXYGEN SATURATION: 95 % | RESPIRATION RATE: 18 BRPM

## 2024-11-27 DIAGNOSIS — F19.10 POLYSUBSTANCE ABUSE (HCC): Primary | ICD-10-CM

## 2024-11-27 DIAGNOSIS — Z86.59 HISTORY OF DEPRESSION: ICD-10-CM

## 2024-11-27 DIAGNOSIS — R44.3 HALLUCINATIONS: ICD-10-CM

## 2024-11-27 LAB
ALBUMIN SERPL BCG-MCNC: 4.3 G/DL (ref 3.5–5.1)
ALP SERPL-CCNC: 81 U/L (ref 38–126)
ALT SERPL W/O P-5'-P-CCNC: 14 U/L (ref 11–66)
AMPHETAMINES UR QL SCN: POSITIVE
ANION GAP SERPL CALC-SCNC: 16 MEQ/L (ref 8–16)
APAP SERPL-MCNC: < 5 UG/ML (ref 0–20)
AST SERPL-CCNC: 19 U/L (ref 5–40)
BACTERIA URNS QL MICRO: ABNORMAL /HPF
BARBITURATES UR QL SCN: NEGATIVE
BASOPHILS ABSOLUTE: 0 THOU/MM3 (ref 0–0.1)
BASOPHILS NFR BLD AUTO: 0.3 %
BENZODIAZ UR QL SCN: POSITIVE
BILIRUB SERPL-MCNC: 0.3 MG/DL (ref 0.3–1.2)
BILIRUB UR QL STRIP.AUTO: NEGATIVE
BUN SERPL-MCNC: 11 MG/DL (ref 7–22)
BZE UR QL SCN: POSITIVE
CALCIUM SERPL-MCNC: 10.2 MG/DL (ref 8.5–10.5)
CANNABINOIDS UR QL SCN: POSITIVE
CASTS #/AREA URNS LPF: ABNORMAL /LPF
CASTS 2: ABNORMAL /LPF
CHARACTER UR: CLEAR
CHLORIDE SERPL-SCNC: 99 MEQ/L (ref 98–111)
CO2 SERPL-SCNC: 24 MEQ/L (ref 23–33)
COLOR, UA: YELLOW
CREAT SERPL-MCNC: 1.2 MG/DL (ref 0.4–1.2)
CRYSTALS URNS MICRO: ABNORMAL
DEPRECATED RDW RBC AUTO: 45.2 FL (ref 35–45)
EKG ATRIAL RATE: 99 BPM
EKG P AXIS: 57 DEGREES
EKG P-R INTERVAL: 126 MS
EKG Q-T INTERVAL: 328 MS
EKG QRS DURATION: 90 MS
EKG QTC CALCULATION (BAZETT): 420 MS
EKG R AXIS: -40 DEGREES
EKG T AXIS: 73 DEGREES
EKG VENTRICULAR RATE: 99 BPM
EOSINOPHIL NFR BLD AUTO: 1.7 %
EOSINOPHILS ABSOLUTE: 0.2 THOU/MM3 (ref 0–0.4)
EPITHELIAL CELLS, UA: ABNORMAL /HPF
ERYTHROCYTE [DISTWIDTH] IN BLOOD BY AUTOMATED COUNT: 14.7 % (ref 11.5–14.5)
FENTANYL: NEGATIVE
GFR SERPL CREATININE-BSD FRML MDRD: 78 ML/MIN/1.73M2
GLUCOSE SERPL-MCNC: 108 MG/DL (ref 70–108)
GLUCOSE UR QL STRIP.AUTO: NEGATIVE MG/DL
HCT VFR BLD AUTO: 43 % (ref 42–52)
HGB BLD-MCNC: 14.2 GM/DL (ref 14–18)
HGB UR QL STRIP.AUTO: NEGATIVE
IMM GRANULOCYTES # BLD AUTO: 0.03 THOU/MM3 (ref 0–0.07)
IMM GRANULOCYTES NFR BLD AUTO: 0.3 %
KETONES UR QL STRIP.AUTO: 15
LACTIC ACID, SEPSIS: 1.5 MMOL/L (ref 0.5–1.9)
LACTIC ACID, SEPSIS: 1.7 MMOL/L (ref 0.5–1.9)
LYMPHOCYTES ABSOLUTE: 2.1 THOU/MM3 (ref 1–4.8)
LYMPHOCYTES NFR BLD AUTO: 23.5 %
MCH RBC QN AUTO: 27.7 PG (ref 26–33)
MCHC RBC AUTO-ENTMCNC: 33 GM/DL (ref 32.2–35.5)
MCV RBC AUTO: 83.8 FL (ref 80–94)
MISCELLANEOUS 2: ABNORMAL
MONOCYTES ABSOLUTE: 0.9 THOU/MM3 (ref 0.4–1.3)
MONOCYTES NFR BLD AUTO: 9.9 %
NEUTROPHILS ABSOLUTE: 5.8 THOU/MM3 (ref 1.8–7.7)
NEUTROPHILS NFR BLD AUTO: 64.3 %
NITRITE UR QL STRIP: NEGATIVE
NRBC BLD AUTO-RTO: 0 /100 WBC
OPIATES UR QL SCN: NEGATIVE
OSMOLALITY SERPL CALC.SUM OF ELEC: 277.5 MOSMOL/KG (ref 275–300)
OXYCODONE: NEGATIVE
PCP UR QL SCN: NEGATIVE
PH UR STRIP.AUTO: 7.5 [PH] (ref 5–9)
PLATELET # BLD AUTO: 429 THOU/MM3 (ref 130–400)
PMV BLD AUTO: 9.6 FL (ref 9.4–12.4)
POTASSIUM SERPL-SCNC: 3.7 MEQ/L (ref 3.5–5.2)
PROT SERPL-MCNC: 8 G/DL (ref 6.1–8)
PROT UR STRIP.AUTO-MCNC: ABNORMAL MG/DL
RBC # BLD AUTO: 5.13 MILL/MM3 (ref 4.7–6.1)
RBC URINE: ABNORMAL /HPF
RENAL EPI CELLS #/AREA URNS HPF: ABNORMAL /[HPF]
SALICYLATES SERPL-MCNC: < 0.3 MG/DL (ref 2–10)
SODIUM SERPL-SCNC: 139 MEQ/L (ref 135–145)
SP GR UR REFRACT.AUTO: 1.02 (ref 1–1.03)
TROPONIN, HIGH SENSITIVITY: 9 NG/L (ref 0–12)
UROBILINOGEN, URINE: 1 EU/DL (ref 0–1)
WBC # BLD AUTO: 9 THOU/MM3 (ref 4.8–10.8)
WBC #/AREA URNS HPF: ABNORMAL /HPF
WBC #/AREA URNS HPF: NEGATIVE /[HPF]
YEAST LIKE FUNGI URNS QL MICRO: ABNORMAL

## 2024-11-27 PROCEDURE — 93010 ELECTROCARDIOGRAM REPORT: CPT | Performed by: INTERNAL MEDICINE

## 2024-11-27 PROCEDURE — 36415 COLL VENOUS BLD VENIPUNCTURE: CPT

## 2024-11-27 PROCEDURE — 6370000000 HC RX 637 (ALT 250 FOR IP): Performed by: EMERGENCY MEDICINE

## 2024-11-27 PROCEDURE — 85025 COMPLETE CBC W/AUTO DIFF WBC: CPT

## 2024-11-27 PROCEDURE — 80143 DRUG ASSAY ACETAMINOPHEN: CPT

## 2024-11-27 PROCEDURE — 80179 DRUG ASSAY SALICYLATE: CPT

## 2024-11-27 PROCEDURE — 84484 ASSAY OF TROPONIN QUANT: CPT

## 2024-11-27 PROCEDURE — 81001 URINALYSIS AUTO W/SCOPE: CPT

## 2024-11-27 PROCEDURE — 80307 DRUG TEST PRSMV CHEM ANLYZR: CPT

## 2024-11-27 PROCEDURE — 80053 COMPREHEN METABOLIC PANEL: CPT

## 2024-11-27 PROCEDURE — 83605 ASSAY OF LACTIC ACID: CPT

## 2024-11-27 PROCEDURE — 99285 EMERGENCY DEPT VISIT HI MDM: CPT

## 2024-11-27 PROCEDURE — 93005 ELECTROCARDIOGRAM TRACING: CPT | Performed by: EMERGENCY MEDICINE

## 2024-11-27 RX ORDER — HYDROXYZINE PAMOATE 25 MG/1
50 CAPSULE ORAL ONCE
Status: COMPLETED | OUTPATIENT
Start: 2024-11-27 | End: 2024-11-27

## 2024-11-27 RX ADMIN — HYDROXYZINE PAMOATE 50 MG: 25 CAPSULE ORAL at 13:24

## 2024-11-27 ASSESSMENT — PATIENT HEALTH QUESTIONNAIRE - PHQ9
SUM OF ALL RESPONSES TO PHQ QUESTIONS 1-9: 1
1. LITTLE INTEREST OR PLEASURE IN DOING THINGS: NOT AT ALL
SUM OF ALL RESPONSES TO PHQ QUESTIONS 1-9: 1
2. FEELING DOWN, DEPRESSED OR HOPELESS: SEVERAL DAYS
SUM OF ALL RESPONSES TO PHQ9 QUESTIONS 1 & 2: 1

## 2024-11-27 ASSESSMENT — PAIN - FUNCTIONAL ASSESSMENT: PAIN_FUNCTIONAL_ASSESSMENT: NONE - DENIES PAIN

## 2024-11-27 ASSESSMENT — SLEEP AND FATIGUE QUESTIONNAIRES
DO YOU HAVE DIFFICULTY SLEEPING: NO
AVERAGE NUMBER OF SLEEP HOURS: 5
DO YOU USE A SLEEP AID: NO

## 2024-11-27 ASSESSMENT — LIFESTYLE VARIABLES
HOW MANY STANDARD DRINKS CONTAINING ALCOHOL DO YOU HAVE ON A TYPICAL DAY: 7 TO 9
HOW OFTEN DO YOU HAVE A DRINK CONTAINING ALCOHOL: 2-4 TIMES A MONTH

## 2024-11-27 NOTE — PROGRESS NOTES
[facility] CRISIS ASSESSMENT    SITUATION  Chief Complaint per ED Provider or Assigned Nurse report: Hallucinations    Chief Complaint per Patient Report: Hallucinations; unforthcoming with information    Chief Complaint per Collateral contact report (who  with pt or by phone): N/A    If collateral was not obtained why: Patient declined     Provisional Diagnosis (ICD or DSM approved diagnosis only): Bipolar; Schizophrenia; Severe episode of recurrent major depressive disorder, without psychotic features (HCC)       BACKGROUND  Risk, Psychosocial and Contextual Factors (homeless, lack of social support, lack of family, unemployed, debt, legal, etc.): Promotes meth and klonopin use; No outpatient services; History of inpatient psychiatric admission; Per EPIC history of suicide attempts    Protective Factors: Stable housing; Family support; Friend support; Occupational; Is on psychiatric medications     Current  Treatment: Denies; states he was at [facility] for three months    Past MH Treatment or Hospitalization (Previous 6 months): Denies; 7E from 02.24.24 to 02.26.24      Present Suicidal Behavior (Include specific information below):    Verbal: Denies    Attempt: Denies    Access to Weapons: Denies    Access to the Means of self-harm or harm to others identified: Denies    C-SSRS Current Suicide Risk: Low, Moderate or High: No Risk      Past Suicidal Behavior (Include specific information below):    Verbal: Denies    Attempts: Denies; Per EPIC patient attempted to overdose on Xanax pills on 02.21.24    Homicidal: Denies    Hallucinations/Delusions: Patient reports seeing things that are not there, reporting he \"did something\" that haunts him when he does meth     Self-Injurious/Self-Mutilation: Denies    Traumatic Event Within Past 2 Weeks: Patient reports his family has been \"acting different\"    Current Abuse: Denies    Legal Involvement: Denies    Violence: Denies    Housing: At grandparents house with them

## 2024-11-27 NOTE — ED NOTES
Pt returned to safe rooms. Per ED Charge nurse and  Dr. Talbot, pt is stable to be in safe rooms with 30 minute vital sign collection. ED Tech at bedside. Tech instructed to collect VS q 30 minutes per provider recommendation. Pt in stable condition

## 2024-11-27 NOTE — ED NOTES
Pt medicated per MAR. Remains in stable condition. Pt remains in safe room. Continuous monitoring provided by campus police. Pt in stable condition at this time.

## 2024-11-27 NOTE — ED PROVIDER NOTES
occasionally words are mis-transcribed . The transcription may contain errors not detected in proofreading.  This transcription was electronically signed.)     11/27/24 3:12 PM      No att. providers found      Emergency room physician

## 2024-11-27 NOTE — ED NOTES
Pt to ED for eval of hallucinations. Pt states his hallucinations \" are going to kill him\". Pt arrives in a shredded flannel jacket. Pt state this occurred today and he did it himself d/t being afraid. Pt states he is an active meth user with last use being \"a couple days ago.\" Also states he \"probably took a couple klonopin but not too many.\" Pt transferred from ED 26 to ED room 16 d/t abnormal VS. Pt requesting bedside sitter d/t fear of hallucinations.

## 2024-11-27 NOTE — ED NOTES
Upon initial contact with the patient, pt resting in bed talking to constant observer. VSS. Explained POC to patient.

## 2024-11-27 NOTE — ED NOTES
Patient having increased paranoia due to people in hallways and [employer] guard in hallway. Patient agreed to move to safe rooms at this time.    Yes

## (undated) DEVICE — GLOVE ORANGE PI 7   MSG9070

## (undated) DEVICE — GOWN,SIRUS,NON REINFRCD,LARGE,SET IN SL: Brand: MEDLINE

## (undated) DEVICE — COVER ARMBRD W13XL28.5IN IMPERV BLU FOR OP RM

## (undated) DEVICE — STRIP,CLOSURE,WOUND,MEDI-STRIP,1/2X4: Brand: MEDLINE

## (undated) DEVICE — SYRINGE MED 10ML LUERLOCK TIP W/O SFTY DISP

## (undated) DEVICE — APPLICATOR MEDICATED 26 CC SOLUTION HI LT ORNG CHLORAPREP

## (undated) DEVICE — GAUZE,SPONGE,8"X4",12PLY,XRAY,STRL,LF: Brand: MEDLINE

## (undated) DEVICE — PENCIL SMK EVAC ALL IN 1 DSGN ENH VISIBILITY IMPROVED AIR

## (undated) DEVICE — GLOVE SURG SZ 65 THK91MIL LTX FREE SYN POLYISOPRENE

## (undated) DEVICE — DRESSING TRNSPAR W5XL4.5IN FLM SHT SEMIPERMEABLE WIND

## (undated) DEVICE — GAUZE,SPONGE,4"X4",12PLY,STERILE,LF,2'S: Brand: MEDLINE

## (undated) DEVICE — YANKAUER,BULB TIP,W/O VENT,RIGID,STERILE: Brand: MEDLINE

## (undated) DEVICE — SUTURE VCRL SZ 3-0 L27IN ABSRB UD L26MM SH 1/2 CIR J416H

## (undated) DEVICE — GLOVE SURG SZ 7 L12IN FNGR THK94MIL TRNSLUC YEL LTX HYDRGEL

## (undated) DEVICE — TUBING, SUCTION, 1/4" X 20', STRAIGHT: Brand: MEDLINE INDUSTRIES, INC.

## (undated) DEVICE — BREAST HERNIA PACK: Brand: MEDLINE INDUSTRIES, INC.

## (undated) DEVICE — SUTURE VCRL + SZ 3-0 L27IN ABSRB UD L26MM SH 1/2 CIR VCP416H

## (undated) DEVICE — LINER SUCT CANSTR 1500CC SEMI RIG W/ POR HYDROPHOBIC SHUT

## (undated) DEVICE — 3M™ STERI-STRIP™ COMPOUND BENZOIN TINCTURE 40 BAGS/CARTON 4 CARTONS/CASE C1544: Brand: 3M™ STERI-STRIP™